# Patient Record
Sex: FEMALE | Race: OTHER | NOT HISPANIC OR LATINO | Employment: OTHER | URBAN - METROPOLITAN AREA
[De-identification: names, ages, dates, MRNs, and addresses within clinical notes are randomized per-mention and may not be internally consistent; named-entity substitution may affect disease eponyms.]

---

## 2017-01-25 ENCOUNTER — HOSPITAL ENCOUNTER (EMERGENCY)
Facility: HOSPITAL | Age: 52
Discharge: HOME | End: 2017-01-25
Admitting: EMERGENCY MEDICINE
Payer: COMMERCIAL

## 2017-01-25 VITALS
BODY MASS INDEX: 49.49 KG/M2 | SYSTOLIC BLOOD PRESSURE: 133 MMHG | HEIGHT: 62 IN | RESPIRATION RATE: 18 BRPM | WEIGHT: 268.96 LBS | DIASTOLIC BLOOD PRESSURE: 62 MMHG | OXYGEN SATURATION: 97 % | HEART RATE: 78 BPM | TEMPERATURE: 97.7 F

## 2017-01-25 DIAGNOSIS — G89.29 CHRONIC RADICULAR PAIN OF LOWER BACK: Primary | ICD-10-CM

## 2017-01-25 DIAGNOSIS — M54.16 CHRONIC RADICULAR PAIN OF LOWER BACK: Primary | ICD-10-CM

## 2017-01-25 DIAGNOSIS — IMO0001 ELEVATED BLOOD PRESSURE: ICD-10-CM

## 2017-01-25 PROCEDURE — 99283 EMERGENCY DEPT VISIT LOW MDM: CPT

## 2017-01-25 PROCEDURE — 96372 THER/PROPH/DIAG INJ SC/IM: CPT

## 2017-01-25 RX ORDER — NAPROXEN 500 MG/1
500 TABLET ORAL 2 TIMES DAILY WITH MEALS
COMMUNITY
End: 2019-11-21

## 2017-01-25 RX ORDER — TOPIRAMATE 100 MG/1
100 TABLET, FILM COATED ORAL 2 TIMES DAILY
COMMUNITY
End: 2021-08-25 | Stop reason: SINTOL

## 2017-01-25 RX ORDER — LOSARTAN POTASSIUM 50 MG/1
50 TABLET ORAL DAILY
COMMUNITY
End: 2019-11-21

## 2017-01-25 RX ORDER — GABAPENTIN 600 MG/1
600 TABLET ORAL 2 TIMES DAILY
COMMUNITY

## 2017-01-25 RX ORDER — ALBUTEROL SULFATE 2.5 MG/3ML
2.5 SOLUTION RESPIRATORY (INHALATION) 4 TIMES DAILY
COMMUNITY

## 2017-01-25 RX ORDER — ACETAMINOPHEN AND CODEINE PHOSPHATE 300; 30 MG/1; MG/1
1 TABLET ORAL EVERY 6 HOURS PRN
COMMUNITY
End: 2019-11-21

## 2017-01-25 RX ORDER — KETOROLAC TROMETHAMINE 30 MG/ML
15 INJECTION, SOLUTION INTRAMUSCULAR; INTRAVENOUS ONCE
Status: COMPLETED | OUTPATIENT
Start: 2017-01-25 | End: 2017-01-25

## 2017-01-25 RX ORDER — ERGOCALCIFEROL 1.25 MG/1
50000 CAPSULE ORAL WEEKLY
Status: ON HOLD | COMMUNITY
End: 2021-09-07 | Stop reason: ALTCHOICE

## 2017-01-25 RX ORDER — DIAZEPAM 5 MG/1
5 TABLET ORAL ONCE
Status: COMPLETED | OUTPATIENT
Start: 2017-01-25 | End: 2017-01-25

## 2017-01-25 RX ORDER — MELOXICAM 15 MG/1
15 TABLET ORAL DAILY
COMMUNITY
End: 2019-11-21

## 2017-01-25 RX ORDER — OMEPRAZOLE 40 MG/1
40 CAPSULE, DELAYED RELEASE ORAL DAILY
COMMUNITY
End: 2019-11-21

## 2017-01-25 RX ORDER — ALBUTEROL SULFATE 90 UG/1
2 AEROSOL, METERED RESPIRATORY (INHALATION) EVERY 6 HOURS PRN
COMMUNITY

## 2017-01-25 RX ADMIN — KETOROLAC TROMETHAMINE 15 MG: 30 INJECTION, SOLUTION INTRAMUSCULAR at 14:19

## 2017-01-25 RX ADMIN — DIAZEPAM 5 MG: 5 TABLET ORAL at 14:19

## 2019-07-19 ENCOUNTER — APPOINTMENT (EMERGENCY)
Dept: CT IMAGING | Facility: HOSPITAL | Age: 54
End: 2019-07-19
Payer: COMMERCIAL

## 2019-07-19 ENCOUNTER — HOSPITAL ENCOUNTER (EMERGENCY)
Facility: HOSPITAL | Age: 54
Discharge: HOME | End: 2019-07-19
Attending: EMERGENCY MEDICINE | Admitting: EMERGENCY MEDICINE
Payer: COMMERCIAL

## 2019-07-19 VITALS
SYSTOLIC BLOOD PRESSURE: 117 MMHG | DIASTOLIC BLOOD PRESSURE: 65 MMHG | HEIGHT: 62 IN | OXYGEN SATURATION: 94 % | HEART RATE: 65 BPM | BODY MASS INDEX: 29.05 KG/M2 | TEMPERATURE: 98.2 F | WEIGHT: 157.85 LBS | RESPIRATION RATE: 18 BRPM

## 2019-07-19 DIAGNOSIS — R10.13 EPIGASTRIC PAIN: ICD-10-CM

## 2019-07-19 DIAGNOSIS — K52.9 ENTERITIS: Primary | ICD-10-CM

## 2019-07-19 DIAGNOSIS — R11.2 NAUSEA AND VOMITING: ICD-10-CM

## 2019-07-19 LAB
ALBUMIN SERPL BCP-MCNC: 3.6 G/DL (ref 3.5–5)
ALP SERPL-CCNC: 205 U/L (ref 46–116)
ALT SERPL W P-5'-P-CCNC: 34 U/L (ref 12–78)
ANION GAP SERPL CALCULATED.3IONS-SCNC: 9 MMOL/L (ref 4–13)
AST SERPL W P-5'-P-CCNC: 17 U/L (ref 5–45)
B-HCG SERPL-ACNC: 2 MIU/ML
BASOPHILS # BLD AUTO: 0.06 THOUSANDS/ΜL (ref 0–0.1)
BASOPHILS NFR BLD AUTO: 1 % (ref 0–1)
BILIRUB SERPL-MCNC: 0.4 MG/DL (ref 0.2–1)
BILIRUB UR QL STRIP: NEGATIVE
BUN SERPL-MCNC: 10 MG/DL (ref 5–25)
CALCIUM SERPL-MCNC: 9.3 MG/DL (ref 8.3–10.1)
CHLORIDE SERPL-SCNC: 106 MMOL/L (ref 100–108)
CLARITY UR: CLEAR
CO2 SERPL-SCNC: 27 MMOL/L (ref 21–32)
COLOR UR: YELLOW
CREAT SERPL-MCNC: 0.61 MG/DL (ref 0.6–1.3)
EOSINOPHIL # BLD AUTO: 0.26 THOUSAND/ΜL (ref 0–0.61)
EOSINOPHIL NFR BLD AUTO: 3 % (ref 0–6)
ERYTHROCYTE [DISTWIDTH] IN BLOOD BY AUTOMATED COUNT: 13 % (ref 11.6–15.1)
GFR SERPL CREATININE-BSD FRML MDRD: 105 ML/MIN/1.73SQ M
GLUCOSE SERPL-MCNC: 84 MG/DL (ref 65–140)
GLUCOSE UR STRIP-MCNC: NEGATIVE MG/DL
HCT VFR BLD AUTO: 40.7 % (ref 34.8–46.1)
HGB BLD-MCNC: 13.6 G/DL (ref 11.5–15.4)
HGB UR QL STRIP.AUTO: NEGATIVE
HOLD SPECIMEN: NORMAL
IMM GRANULOCYTES # BLD AUTO: 0.03 THOUSAND/UL (ref 0–0.2)
IMM GRANULOCYTES NFR BLD AUTO: 0 % (ref 0–2)
KETONES UR STRIP-MCNC: NEGATIVE MG/DL
LEUKOCYTE ESTERASE UR QL STRIP: NEGATIVE
LIPASE SERPL-CCNC: 172 U/L (ref 73–393)
LYMPHOCYTES # BLD AUTO: 2.74 THOUSANDS/ΜL (ref 0.6–4.47)
LYMPHOCYTES NFR BLD AUTO: 30 % (ref 14–44)
MCH RBC QN AUTO: 29.6 PG (ref 26.8–34.3)
MCHC RBC AUTO-ENTMCNC: 33.4 G/DL (ref 31.4–37.4)
MCV RBC AUTO: 89 FL (ref 82–98)
MONOCYTES # BLD AUTO: 0.46 THOUSAND/ΜL (ref 0.17–1.22)
MONOCYTES NFR BLD AUTO: 5 % (ref 4–12)
NEUTROPHILS # BLD AUTO: 5.48 THOUSANDS/ΜL (ref 1.85–7.62)
NEUTS SEG NFR BLD AUTO: 61 % (ref 43–75)
NITRITE UR QL STRIP: NEGATIVE
NRBC BLD AUTO-RTO: 0 /100 WBCS
PH UR STRIP.AUTO: 7.5 [PH]
PLATELET # BLD AUTO: 309 THOUSANDS/UL (ref 149–390)
PMV BLD AUTO: 8.9 FL (ref 8.9–12.7)
POTASSIUM SERPL-SCNC: 3.9 MMOL/L (ref 3.5–5.3)
PROT SERPL-MCNC: 7.1 G/DL (ref 6.4–8.2)
PROT UR STRIP-MCNC: NEGATIVE MG/DL
RBC # BLD AUTO: 4.6 MILLION/UL (ref 3.81–5.12)
SODIUM SERPL-SCNC: 142 MMOL/L (ref 136–145)
SP GR UR STRIP.AUTO: 1.01 (ref 1–1.03)
TROPONIN I SERPL-MCNC: <0.02 NG/ML
UROBILINOGEN UR QL STRIP.AUTO: 2 E.U./DL
WBC # BLD AUTO: 9.03 THOUSAND/UL (ref 4.31–10.16)

## 2019-07-19 PROCEDURE — 93005 ELECTROCARDIOGRAM TRACING: CPT

## 2019-07-19 PROCEDURE — 81003 URINALYSIS AUTO W/O SCOPE: CPT | Performed by: PHYSICIAN ASSISTANT

## 2019-07-19 PROCEDURE — 96374 THER/PROPH/DIAG INJ IV PUSH: CPT

## 2019-07-19 PROCEDURE — 80053 COMPREHEN METABOLIC PANEL: CPT | Performed by: EMERGENCY MEDICINE

## 2019-07-19 PROCEDURE — 85025 COMPLETE CBC W/AUTO DIFF WBC: CPT | Performed by: EMERGENCY MEDICINE

## 2019-07-19 PROCEDURE — 36415 COLL VENOUS BLD VENIPUNCTURE: CPT | Performed by: EMERGENCY MEDICINE

## 2019-07-19 PROCEDURE — 83690 ASSAY OF LIPASE: CPT | Performed by: EMERGENCY MEDICINE

## 2019-07-19 PROCEDURE — 84484 ASSAY OF TROPONIN QUANT: CPT | Performed by: PHYSICIAN ASSISTANT

## 2019-07-19 PROCEDURE — 74177 CT ABD & PELVIS W/CONTRAST: CPT

## 2019-07-19 PROCEDURE — 99285 EMERGENCY DEPT VISIT HI MDM: CPT

## 2019-07-19 PROCEDURE — 99285 EMERGENCY DEPT VISIT HI MDM: CPT | Performed by: PHYSICIAN ASSISTANT

## 2019-07-19 PROCEDURE — 96361 HYDRATE IV INFUSION ADD-ON: CPT

## 2019-07-19 PROCEDURE — 96375 TX/PRO/DX INJ NEW DRUG ADDON: CPT

## 2019-07-19 PROCEDURE — 84702 CHORIONIC GONADOTROPIN TEST: CPT | Performed by: PHYSICIAN ASSISTANT

## 2019-07-19 RX ORDER — ONDANSETRON 4 MG/1
4 TABLET, FILM COATED ORAL EVERY 6 HOURS
Qty: 12 TABLET | Refills: 0 | Status: ON HOLD | OUTPATIENT
Start: 2019-07-19 | End: 2020-03-03 | Stop reason: ALTCHOICE

## 2019-07-19 RX ORDER — DICYCLOMINE HCL 20 MG
20 TABLET ORAL ONCE
Status: COMPLETED | OUTPATIENT
Start: 2019-07-19 | End: 2019-07-19

## 2019-07-19 RX ORDER — HYDROMORPHONE HCL/PF 1 MG/ML
1 SYRINGE (ML) INJECTION ONCE
Status: COMPLETED | OUTPATIENT
Start: 2019-07-19 | End: 2019-07-19

## 2019-07-19 RX ORDER — ACETAMINOPHEN 325 MG/1
650 TABLET ORAL ONCE
Status: COMPLETED | OUTPATIENT
Start: 2019-07-19 | End: 2019-07-19

## 2019-07-19 RX ORDER — SENNOSIDES 8.6 MG
650 CAPSULE ORAL EVERY 8 HOURS PRN
Qty: 9 TABLET | Refills: 0 | Status: SHIPPED | OUTPATIENT
Start: 2019-07-19 | End: 2019-07-22

## 2019-07-19 RX ORDER — ONDANSETRON 4 MG/1
4 TABLET, ORALLY DISINTEGRATING ORAL ONCE
Status: COMPLETED | OUTPATIENT
Start: 2019-07-19 | End: 2019-07-19

## 2019-07-19 RX ORDER — DICYCLOMINE HCL 20 MG
20 TABLET ORAL 2 TIMES DAILY
Qty: 10 TABLET | Refills: 0 | Status: ON HOLD | OUTPATIENT
Start: 2019-07-19 | End: 2020-03-03 | Stop reason: ALTCHOICE

## 2019-07-19 RX ADMIN — ACETAMINOPHEN 650 MG: 325 TABLET, FILM COATED ORAL at 23:12

## 2019-07-19 RX ADMIN — DICYCLOMINE HYDROCHLORIDE 20 MG: 20 TABLET ORAL at 20:28

## 2019-07-19 RX ADMIN — SODIUM CHLORIDE 1000 ML: 0.9 INJECTION, SOLUTION INTRAVENOUS at 20:27

## 2019-07-19 RX ADMIN — FAMOTIDINE 20 MG: 10 INJECTION, SOLUTION INTRAVENOUS at 20:28

## 2019-07-19 RX ADMIN — HYDROMORPHONE HYDROCHLORIDE 1 MG: 1 INJECTION, SOLUTION INTRAMUSCULAR; INTRAVENOUS; SUBCUTANEOUS at 20:31

## 2019-07-19 RX ADMIN — IOHEXOL 100 ML: 350 INJECTION, SOLUTION INTRAVENOUS at 21:25

## 2019-07-19 RX ADMIN — ONDANSETRON 4 MG: 4 TABLET, ORALLY DISINTEGRATING ORAL at 20:28

## 2019-07-20 LAB
ATRIAL RATE: 69 BPM
P AXIS: 78 DEGREES
PR INTERVAL: 184 MS
QRS AXIS: 79 DEGREES
QRSD INTERVAL: 88 MS
QT INTERVAL: 418 MS
QTC INTERVAL: 447 MS
T WAVE AXIS: 67 DEGREES
VENTRICULAR RATE: 69 BPM

## 2019-07-20 PROCEDURE — 93010 ELECTROCARDIOGRAM REPORT: CPT | Performed by: INTERNAL MEDICINE
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
## 2019-07-20 NOTE — DISCHARGE INSTRUCTIONS
Take Bentyl and Tylenol as indicated  Follow-up with GI as needed  Follow-up with PCP  Follow up with emergency department symptoms persist or exacerbate

## 2019-07-20 NOTE — ED NOTES
Pt refused discharge vitals at this time stating "I just want to go home"        [place]  07/19/19 3448

## 2019-11-19 RX ORDER — LISINOPRIL 10 MG/1
TABLET ORAL EVERY 24 HOURS
COMMUNITY
End: 2019-11-21

## 2019-11-19 RX ORDER — PREDNISONE 10 MG/1
TABLET ORAL
COMMUNITY
Start: 2019-11-13 | End: 2021-08-25 | Stop reason: ALTCHOICE

## 2019-11-19 RX ORDER — PANTOPRAZOLE SODIUM 40 MG/1
40 TABLET, DELAYED RELEASE ORAL
COMMUNITY
Start: 2019-07-26

## 2019-11-19 RX ORDER — LEVOFLOXACIN 500 MG/1
500 TABLET, FILM COATED ORAL DAILY
COMMUNITY
Start: 2019-11-13 | End: 2019-11-23

## 2019-11-19 RX ORDER — LIDOCAINE 50 MG/G
PATCH TOPICAL
COMMUNITY
End: 2019-11-21

## 2019-11-19 RX ORDER — DULOXETIN HYDROCHLORIDE 30 MG/1
30 CAPSULE, DELAYED RELEASE ORAL 2 TIMES DAILY
COMMUNITY
Start: 2019-11-12 | End: 2022-06-15

## 2019-11-19 RX ORDER — TRAZODONE HYDROCHLORIDE 50 MG/1
TABLET ORAL DAILY
COMMUNITY
End: 2019-11-21

## 2019-11-19 RX ORDER — CELECOXIB 200 MG/1
200 CAPSULE ORAL 2 TIMES DAILY
COMMUNITY
Start: 2019-10-15 | End: 2020-01-22 | Stop reason: SDUPTHER

## 2019-11-19 RX ORDER — SENNOSIDES 8.6 MG
1 CAPSULE ORAL EVERY 8 HOURS
COMMUNITY
Start: 2019-07-22

## 2019-11-19 RX ORDER — SUMATRIPTAN 100 MG/1
TABLET, FILM COATED ORAL DAILY
COMMUNITY
End: 2019-11-21

## 2019-11-19 RX ORDER — ONDANSETRON 4 MG/1
4 TABLET, ORALLY DISINTEGRATING ORAL AS NEEDED
COMMUNITY
Start: 2019-07-09

## 2019-11-21 ENCOUNTER — APPOINTMENT (OUTPATIENT)
Dept: RADIOLOGY | Facility: CLINIC | Age: 54
End: 2019-11-21
Payer: COMMERCIAL

## 2019-11-21 ENCOUNTER — OFFICE VISIT (OUTPATIENT)
Dept: OBGYN CLINIC | Facility: CLINIC | Age: 54
End: 2019-11-21
Payer: COMMERCIAL

## 2019-11-21 VITALS
BODY MASS INDEX: 28.87 KG/M2 | SYSTOLIC BLOOD PRESSURE: 127 MMHG | HEIGHT: 62 IN | DIASTOLIC BLOOD PRESSURE: 72 MMHG | HEART RATE: 74 BPM

## 2019-11-21 DIAGNOSIS — M25.561 PAIN IN BOTH KNEES, UNSPECIFIED CHRONICITY: Primary | ICD-10-CM

## 2019-11-21 DIAGNOSIS — M25.561 PAIN IN BOTH KNEES, UNSPECIFIED CHRONICITY: ICD-10-CM

## 2019-11-21 DIAGNOSIS — M25.562 PAIN IN BOTH KNEES, UNSPECIFIED CHRONICITY: ICD-10-CM

## 2019-11-21 DIAGNOSIS — M25.562 PAIN IN BOTH KNEES, UNSPECIFIED CHRONICITY: Primary | ICD-10-CM

## 2019-11-21 PROCEDURE — 73562 X-RAY EXAM OF KNEE 3: CPT

## 2019-11-21 PROCEDURE — 99204 OFFICE O/P NEW MOD 45 MIN: CPT | Performed by: ORTHOPAEDIC SURGERY

## 2019-11-21 PROCEDURE — 20610 DRAIN/INJ JOINT/BURSA W/O US: CPT | Performed by: ORTHOPAEDIC SURGERY

## 2019-11-21 PROCEDURE — 73560 X-RAY EXAM OF KNEE 1 OR 2: CPT

## 2019-11-21 RX ORDER — BETAMETHASONE SODIUM PHOSPHATE AND BETAMETHASONE ACETATE 3; 3 MG/ML; MG/ML
6 INJECTION, SUSPENSION INTRA-ARTICULAR; INTRALESIONAL; INTRAMUSCULAR; SOFT TISSUE
Status: COMPLETED | OUTPATIENT
Start: 2019-11-21 | End: 2019-11-21

## 2019-11-21 RX ORDER — LIDOCAINE HYDROCHLORIDE 10 MG/ML
1 INJECTION, SOLUTION INFILTRATION; PERINEURAL
Status: COMPLETED | OUTPATIENT
Start: 2019-11-21 | End: 2019-11-21

## 2019-11-21 RX ADMIN — BETAMETHASONE SODIUM PHOSPHATE AND BETAMETHASONE ACETATE 6 MG: 3; 3 INJECTION, SUSPENSION INTRA-ARTICULAR; INTRALESIONAL; INTRAMUSCULAR; SOFT TISSUE at 09:31

## 2019-11-21 RX ADMIN — LIDOCAINE HYDROCHLORIDE 1 ML: 10 INJECTION, SOLUTION INFILTRATION; PERINEURAL at 09:31

## 2019-11-21 NOTE — PROGRESS NOTES
Assessment:  1  Pain in both knees, unspecified chronicity  XR knee 3 vw right non injury    CANCELED: XR knee 3 vw left non injury     There is no problem list on file for this patient  Plan      Cortisone injections bilateral knees  Lubricant injections will be ordered   brace medial  for the right knee  We talked about physical therapy we will order physical therapy at some point to get the last couple degrees of range of motion but at this time will hold off she has a lot of things going on right now and wants to address those 1st   We talked about knee replacements and to make sure that all medical conditions are taking care of and that she quit smoking she will talk to her family physician about this  She does have multiple joint pain and body aches we are going to send her to a rheumatologist to work up the possibility of her rheumatologic like disease including and not limited 2 fibromyalgia            Subjective:     Patient ID:    Chief Complaint:Neela Harrison 48 y o  female      HPI    Patient comes in today with regards to bilateral knee pain  The patient reports that the pain is in the anterior and medially and anterior laterally and has been going on for 5-10 years  The pain is rated at3 at its best and10 at its worst   The pain is described as stabbing and aching  It is worsened with whether up and down steps, and is made better with uncertain  The patient has taken cortisone injection without relief compound cream as well for treatment  Patient used to be 310 lb had gastric bypass and lost 182 lb        The following portions of the patient's history were reviewed and updated as appropriate: allergies, current medications, past family history, past social history, past surgical history and problem list         Social History     Socioeconomic History    Marital status: /Civil Union     Spouse name: Not on file    Number of children: Not on file    Years of education: Not on file    Highest education level: Not on file   Occupational History    Not on file   Social Needs    Financial resource strain: Not on file    Food insecurity:     Worry: Not on file     Inability: Not on file    Transportation needs:     Medical: Not on file     Non-medical: Not on file   Tobacco Use    Smoking status: Current Every Day Smoker     Packs/day: 1 00     Types: Cigarettes    Smokeless tobacco: Never Used   Substance and Sexual Activity    Alcohol use: No    Drug use: No    Sexual activity: Not on file   Lifestyle    Physical activity:     Days per week: Not on file     Minutes per session: Not on file    Stress: Not on file   Relationships    Social connections:     Talks on phone: Not on file     Gets together: Not on file     Attends Alevism service: Not on file     Active member of club or organization: Not on file     Attends meetings of clubs or organizations: Not on file     Relationship status: Not on file    Intimate partner violence:     Fear of current or ex partner: Not on file     Emotionally abused: Not on file     Physically abused: Not on file     Forced sexual activity: Not on file   Other Topics Concern    Not on file   Social History Narrative    Not on file     Past Medical History:   Diagnosis Date    Arthritis     Asthma     COPD [facility]chronic obstructive pulmonary disease) [facility]     Depression     Diabetes mellitus ([facility] )     Diverticulitis     Hypertension     Migraine     Pancreatitis     Stomach problems      Past Surgical History:   Procedure Laterality Date    GALLBLADDER SURGERY      GASTRIC BYPASS      STOMACH SURGERY      tummy tuck    TUBAL LIGATION       No Known Allergies  Current Outpatient Medications on File Prior to Visit   Medication Sig Dispense Refill    acetaminophen (TYLENOL) 650 mg CR tablet Take 1 tablet by mouth every 8 (eight) hours      albuterol (2 5 mg/3 mL) 0 083 % nebulizer solution Take 2 5 mg by nebulization 4 (four) times a day      albuterol (PROVENTIL HFA,VENTOLIN HFA) 90 mcg/act inhaler Inhale 2 puffs every 6 (six) hours as needed for wheezing      celecoxib (CeleBREX) 200 mg capsule Take 200 mg by mouth 2 (two) times a day      dicyclomine (BENTYL) 20 mg tablet Take 1 tablet (20 mg total) by mouth 2 (two) times a day for 5 days 10 tablet 0    DULoxetine (CYMBALTA) 30 mg delayed release capsule Take 30 mg by mouth daily      ergocalciferol (VITAMIN D2) 50,000 units Take 50,000 Units by mouth once a week      gabapentin (NEURONTIN) 600 MG tablet Take 600 mg by mouth 3 (three) times a day      ipratropium (ATROVENT) 0 02 % nebulizer solution Inhale 0 25 mg every 6 (six) hours as needed      levofloxacin (LEVAQUIN) 500 mg tablet Take 500 mg by mouth daily      magnesium oxide (MAG-OX) 400 mg Take 400 mg by mouth 2 (two) times a day      mometasone (ASMANEX TWISTHALER) 220 MCG/INH inhaler Inhale 220 mcg daily      NON FORMULARY       ondansetron (ZOFRAN) 4 mg tablet Take 1 tablet (4 mg total) by mouth every 6 (six) hours for 3 days 12 tablet 0    ondansetron (ZOFRAN-ODT) 4 mg disintegrating tablet ondansetron 4 mg disintegrating tablet      pantoprazole (PROTONIX) 40 mg tablet 40 mg      predniSONE 10 mg tablet Take 4 tablets for 3 days then take 3 tablets for 3 days then take 2 tablets for 3 days then take 1 tablet for 3 days      topiramate (TOPAMAX) 100 mg tablet Take 100 mg by mouth 2 (two) times a day      [DISCONTINUED] fluticasone-salmeterol (ADVAIR DISKUS) 250-50 mcg/dose inhaler Inhale 1 puff 2 (two) times a day      [DISCONTINUED] rifaximin (XIFAXAN) 550 mg tablet Take 550 mg by mouth Three times a day      [DISCONTINUED] acetaminophen-codeine (TYLENOL #3) 300-30 mg per tablet Take 1 tablet by mouth every 6 (six) hours as needed for moderate pain        [DISCONTINUED] diclofenac sodium (VOLTAREN) 1 % diclofenac 1 % topical gel      [DISCONTINUED] insulin lispro (HumaLOG) 100 units/mL injection Inject under the skin 3 (three) times a day before meals      [DISCONTINUED] lidocaine (LIDODERM) 5 % lidocaine 5 % topical patch      [DISCONTINUED] lisinopril (ZESTRIL) 10 mg tablet every 24 hours      [DISCONTINUED] losartan (COZAAR) 50 mg tablet Take 50 mg by mouth daily      [DISCONTINUED] meloxicam (MOBIC) 15 mg tablet Take 15 mg by mouth daily      [DISCONTINUED] metFORMIN (GLUCOPHAGE) 1000 MG tablet Take 1,000 mg by mouth 2 (two) times a day with meals      [DISCONTINUED] Mometasone Furo-Formoterol Fum (DULERA IN) Inhale daily      [DISCONTINUED] naproxen (NAPROSYN) 500 mg tablet Take 500 mg by mouth 2 (two) times a day with meals      [DISCONTINUED] omeprazole (PriLOSEC) 40 MG capsule Take 40 mg by mouth daily      [DISCONTINUED] sitaGLIPtin (JANUVIA) 100 mg tablet Take 100 mg by mouth daily      [DISCONTINUED] SUMAtriptan (IMITREX) 100 mg tablet Daily      [DISCONTINUED] traZODone (DESYREL) 50 mg tablet Daily      [DISCONTINUED] UMECLIDINIUM-VILANTEROL IN Inhale 1 puff daily       No current facility-administered medications on file prior to visit  Objective:    Review of Systems   Constitutional: Negative  HENT: Negative  Eyes: Negative  Respiratory: Positive for cough, shortness of breath and wheezing  Cardiovascular: Negative  Gastrointestinal: Positive for abdominal pain  Endocrine: Negative  Genitourinary: Negative  Musculoskeletal:        See HPI   Skin: Negative  Allergic/Immunologic: Negative  Neurological: Positive for dizziness, numbness and headaches  Hematological: Negative  Psychiatric/Behavioral: Positive for decreased concentration  The patient is nervous/anxious  Right Knee Exam     Muscle Strength   The patient has normal right knee strength  Tenderness   The patient is experiencing tenderness in the medial joint line      Range of Motion   Extension: -5   Flexion: normal     Tests   Fazal:  Medial - negative Lateral - negative  Valgus: positive    Other   Erythema: absent  Scars: absent  Sensation: normal  Pulse: present  Swelling: none  Effusion: no effusion present    Comments:  Pinpoint tenderness bilateral knees medially      Left Knee Exam     Muscle Strength   The patient has normal left knee strength  Tenderness   The patient is experiencing tenderness in the medial joint line  Range of Motion   Extension: -5   Flexion: normal     Tests   Fazal:  Medial - negative Lateral - negative  Valgus: positive    Other   Erythema: absent  Sensation: normal  Pulse: present  Swelling: none  Effusion: no effusion present    Comments:  Pinpoint tenderness bilateral knee knees medially            Physical Exam   Constitutional: She is oriented to person, place, and time  She appears well-developed  HENT:   Head: Normocephalic  Eyes: Pupils are equal, round, and reactive to light  Neck: Normal range of motion  Cardiovascular: Normal rate  Pulmonary/Chest: Effort normal    Abdominal: She exhibits no distension  Musculoskeletal:        Right knee: She exhibits no effusion  Left knee: She exhibits no effusion  Neurological: She is alert and oriented to person, place, and time  Skin: Skin is warm  Psychiatric: She has a normal mood and affect  Nursing note and vitals reviewed        Large joint arthrocentesis: R knee  Date/Time: 11/21/2019 9:31 AM  Consent given by: patient  Supporting Documentation  Indications: pain   Procedure Details  Location: knee - R knee  Needle size: 22 G  Ultrasound guidance: no  Approach: anterolateral  Medications administered: 1 mL lidocaine 1 %; 6 mg betamethasone acetate-betamethasone sodium phosphate 6 (3-3) mg/mL      Large joint arthrocentesis: L knee  Date/Time: 11/21/2019 9:31 AM  Consent given by: patient  Timeout: Immediately prior to procedure a time out was called to verify the correct patient, procedure, equipment, support staff and site/side marked as required   Supporting Documentation  Indications: pain   Procedure Details  Location: knee - L knee  Needle size: 22 G  Ultrasound guidance: no  Approach: anterolateral  Medications administered: 1 mL lidocaine 1 %; 6 mg betamethasone acetate-betamethasone sodium phosphate 6 (3-3) mg/mL               I have personally reviewed pertinent films in PACS and my interpretation is X-ray show moderate to severe arthritis on the medial joint space both knees right being worse than left and also patellofemoral joint  There is joint space narrowing osteophyte formation subchondral sclerosis as well as subchondral cyst       Portions of the record may have been created with voice recognition software   Occasional wrong word or "sound a like" substitutions may have occurred due to the inherent limitations of voice recognition software   Read the chart carefully and recognize, using context, where substitutions have occurred

## 2019-12-04 ENCOUNTER — TELEPHONE (OUTPATIENT)
Dept: OBGYN CLINIC | Facility: HOSPITAL | Age: 54
End: 2019-12-04

## 2019-12-04 NOTE — TELEPHONE ENCOUNTER
TO BE COMPLETED BY CENTRAL AUTH TEAM:     Physician: DR Levin Precise    Medication: Oralee Ivanoff    Number of Injections in Series (Appointments scheduled 1 week apart from one another): 3    Schedule after this date: UPON AVAILABILITY    Billing Info: Buy and Bill/Specialty Pharmacy-Patient Supply>>SPECIALTY PHARMACY    Appointment Message Line:  (please copy and paste appointment message line when scheduling appointment) BILATERAL EUFLEXXA #1,#2,#3/[facility]-SPECIALTY PHARMACY    Additional Comments: INJECTIONS SENT TO [facility]

## 2019-12-09 NOTE — TELEPHONE ENCOUNTER
You can schedule her on the 12th, the 19th and skip a week and schedule the third on on 1/2  Its okay to skip a week in between the injections

## 2019-12-09 NOTE — TELEPHONE ENCOUNTER
Dr Sharyle Netters is not in this office 3 weeks in a row until February  Can someone else give one of the injections so she can have them sooner?  Please advise

## 2019-12-10 RX ORDER — BUDESONIDE AND FORMOTEROL FUMARATE DIHYDRATE 160; 4.5 UG/1; UG/1
2 AEROSOL RESPIRATORY (INHALATION) 2 TIMES DAILY
COMMUNITY
Start: 2019-12-10

## 2019-12-10 RX ORDER — METHYLPREDNISOLONE SODIUM SUCCINATE 125 MG/2ML
125 INJECTION, POWDER, LYOPHILIZED, FOR SOLUTION INTRAMUSCULAR; INTRAVENOUS
COMMUNITY
Start: 2019-12-10 | End: 2019-12-10

## 2019-12-10 RX ORDER — LEVOFLOXACIN 500 MG/1
500 TABLET, FILM COATED ORAL DAILY
COMMUNITY
Start: 2019-12-10 | End: 2019-12-20

## 2019-12-12 ENCOUNTER — OFFICE VISIT (OUTPATIENT)
Dept: OBGYN CLINIC | Facility: CLINIC | Age: 54
End: 2019-12-12
Payer: COMMERCIAL

## 2019-12-12 VITALS — DIASTOLIC BLOOD PRESSURE: 75 MMHG | HEART RATE: 77 BPM | SYSTOLIC BLOOD PRESSURE: 113 MMHG

## 2019-12-12 DIAGNOSIS — M17.0 PRIMARY OSTEOARTHRITIS OF BOTH KNEES: Primary | ICD-10-CM

## 2019-12-12 PROCEDURE — 20610 DRAIN/INJ JOINT/BURSA W/O US: CPT | Performed by: ORTHOPAEDIC SURGERY

## 2019-12-12 RX ORDER — HYALURONATE SODIUM 10 MG/ML
20 SYRINGE (ML) INTRAARTICULAR
Status: COMPLETED | OUTPATIENT
Start: 2019-12-12 | End: 2019-12-12

## 2019-12-12 RX ADMIN — Medication 20 MG: at 14:20

## 2019-12-12 NOTE — PROGRESS NOTES
1  Primary osteoarthritis of both knees  Large joint arthrocentesis    Large joint arthrocentesis     Patient is here for her 1st injection of euflexxa into the bilateral knee  Patient reports knee pain right worse than left worse with activities weight bearing  Physical exam of the knee shows no effusion no ecchymosis    All other organ systems normal    Large joint arthrocentesis: L knee  Date/Time: 12/12/2019 2:20 PM  Consent given by: patient  Site marked: site marked  Timeout: Immediately prior to procedure a time out was called to verify the correct patient, procedure, equipment, support staff and site/side marked as required   Supporting Documentation  Indications: pain   Procedure Details  Location: knee - L knee  Preparation: Patient was prepped and draped in the usual sterile fashion  Needle size: 22 G  Ultrasound guidance: no  Approach: anterolateral  Medications administered: 20 mg Sodium Hyaluronate 20 MG/2ML  Specialty Pharmacy Supplied: received medications from pharmacy  Patient tolerance: patient tolerated the procedure well with no immediate complications  Dressing:  Sterile dressing applied    Large joint arthrocentesis: R knee  Date/Time: 12/12/2019 2:20 PM  Consent given by: patient  Site marked: site marked  Timeout: Immediately prior to procedure a time out was called to verify the correct patient, procedure, equipment, support staff and site/side marked as required   Supporting Documentation  Indications: pain   Procedure Details  Location: knee - R knee  Preparation: Patient was prepped and draped in the usual sterile fashion  Needle size: 22 G  Ultrasound guidance: no  Approach: anterolateral  Medications administered: 20 mg Sodium Hyaluronate 20 MG/2ML  Specialty Pharmacy Supplied: received medications from pharmacy  Patient tolerance: patient tolerated the procedure well with no immediate complications  Dressing:  Sterile dressing applied          Patient tolerated procedure follow up 1 week

## 2019-12-19 ENCOUNTER — OFFICE VISIT (OUTPATIENT)
Dept: OBGYN CLINIC | Facility: CLINIC | Age: 54
End: 2019-12-19
Payer: COMMERCIAL

## 2019-12-19 VITALS — DIASTOLIC BLOOD PRESSURE: 86 MMHG | SYSTOLIC BLOOD PRESSURE: 127 MMHG | HEART RATE: 75 BPM

## 2019-12-19 DIAGNOSIS — M17.0 PRIMARY OSTEOARTHRITIS OF BOTH KNEES: Primary | ICD-10-CM

## 2019-12-19 PROCEDURE — 20610 DRAIN/INJ JOINT/BURSA W/O US: CPT | Performed by: ORTHOPAEDIC SURGERY

## 2019-12-19 RX ORDER — HYALURONATE SODIUM 10 MG/ML
20 SYRINGE (ML) INTRAARTICULAR
Status: COMPLETED | OUTPATIENT
Start: 2019-12-19 | End: 2019-12-19

## 2019-12-19 RX ADMIN — Medication 20 MG: at 08:31

## 2019-12-19 NOTE — PROGRESS NOTES
1  Primary osteoarthritis of both knees  Large joint arthrocentesis    Large joint arthrocentesis     Patient is here for her  2nd injection of  Euflexxa into the bilateral knee  Patient reports knee pain left worse than right  No significant improvements since the 1st injection  Physical exam of the knee shows no effusion no ecchymosis        Large joint arthrocentesis: L knee  Date/Time: 12/19/2019 8:31 AM  Consent given by: patient  Site marked: site marked  Timeout: Immediately prior to procedure a time out was called to verify the correct patient, procedure, equipment, support staff and site/side marked as required   Supporting Documentation  Indications: pain   Procedure Details  Location: knee - L knee  Preparation: Patient was prepped and draped in the usual sterile fashion  Needle size: 22 G  Ultrasound guidance: no  Approach: anterolateral  Medications administered: 20 mg Sodium Hyaluronate 20 MG/2ML  Specialty Pharmacy Supplied: received medications from pharmacy  Patient tolerance: patient tolerated the procedure well with no immediate complications  Dressing:  Sterile dressing applied    Large joint arthrocentesis: R knee  Date/Time: 12/19/2019 8:31 AM  Consent given by: patient  Site marked: site marked  Timeout: Immediately prior to procedure a time out was called to verify the correct patient, procedure, equipment, support staff and site/side marked as required   Supporting Documentation  Indications: pain   Procedure Details  Location: knee - R knee  Preparation: Patient was prepped and draped in the usual sterile fashion  Needle size: 22 G  Ultrasound guidance: no  Approach: anterolateral  Medications administered: 20 mg Sodium Hyaluronate 20 MG/2ML  Specialty Pharmacy Supplied: received medications from pharmacy  Patient tolerance: patient tolerated the procedure well with no immediate complications  Dressing:  Sterile dressing applied          Patient tolerated procedure follow up  1 week

## 2020-01-02 ENCOUNTER — OFFICE VISIT (OUTPATIENT)
Dept: OBGYN CLINIC | Facility: CLINIC | Age: 55
End: 2020-01-02
Payer: COMMERCIAL

## 2020-01-02 DIAGNOSIS — M17.0 PRIMARY OSTEOARTHRITIS OF BOTH KNEES: Primary | ICD-10-CM

## 2020-01-02 PROCEDURE — 20610 DRAIN/INJ JOINT/BURSA W/O US: CPT | Performed by: ORTHOPAEDIC SURGERY

## 2020-01-02 RX ORDER — HYALURONATE SODIUM 10 MG/ML
20 SYRINGE (ML) INTRAARTICULAR
Status: COMPLETED | OUTPATIENT
Start: 2020-01-02 | End: 2020-01-02

## 2020-01-02 RX ADMIN — Medication 20 MG: at 09:54

## 2020-01-02 NOTE — PROGRESS NOTES
1  Primary osteoarthritis of both knees  Large joint arthrocentesis    Large joint arthrocentesis     Patient is here for her  3rd injection of  Euflexxa into the bilateral knee  Patient reports improvements in pain in the right knee but  Not on the left  Physical exam of the knee shows no effusion no ecchymosis  Large joint arthrocentesis: L knee  Date/Time: 1/2/2020 9:54 AM  Consent given by: patient  Site marked: site marked  Timeout: Immediately prior to procedure a time out was called to verify the correct patient, procedure, equipment, support staff and site/side marked as required   Supporting Documentation  Indications: pain   Procedure Details  Location: knee - L knee  Preparation: Patient was prepped and draped in the usual sterile fashion  Needle size: 22 G  Ultrasound guidance: no  Approach: anterolateral  Medications administered: 20 mg Sodium Hyaluronate 20 MG/2ML    Patient tolerance: patient tolerated the procedure well with no immediate complications  Dressing:  Sterile dressing applied    Large joint arthrocentesis: R knee  Date/Time: 1/2/2020 9:54 AM  Consent given by: patient  Site marked: site marked  Timeout: Immediately prior to procedure a time out was called to verify the correct patient, procedure, equipment, support staff and site/side marked as required   Supporting Documentation  Indications: pain   Procedure Details  Location: knee - R knee  Preparation: Patient was prepped and draped in the usual sterile fashion  Needle size: 22 G  Ultrasound guidance: no  Approach: anterolateral  Medications administered: 20 mg Sodium Hyaluronate 20 MG/2ML    Patient tolerance: patient tolerated the procedure well with no immediate complications  Dressing:  Sterile dressing applied          Patient tolerated procedure follow up  P r n

## 2020-01-22 ENCOUNTER — OFFICE VISIT (OUTPATIENT)
Dept: RHEUMATOLOGY | Facility: CLINIC | Age: 55
End: 2020-01-22
Payer: COMMERCIAL

## 2020-01-22 VITALS — HEIGHT: 62 IN | BODY MASS INDEX: 29.44 KG/M2 | RESPIRATION RATE: 18 BRPM | WEIGHT: 160 LBS

## 2020-01-22 DIAGNOSIS — M19.90 OSTEOARTHRITIS, UNSPECIFIED OSTEOARTHRITIS TYPE, UNSPECIFIED SITE: Primary | ICD-10-CM

## 2020-01-22 DIAGNOSIS — G56.03 BILATERAL CARPAL TUNNEL SYNDROME: ICD-10-CM

## 2020-01-22 DIAGNOSIS — M79.7 FIBROMYALGIA: ICD-10-CM

## 2020-01-22 DIAGNOSIS — M25.562 PAIN IN BOTH KNEES, UNSPECIFIED CHRONICITY: ICD-10-CM

## 2020-01-22 DIAGNOSIS — M25.561 PAIN IN BOTH KNEES, UNSPECIFIED CHRONICITY: ICD-10-CM

## 2020-01-22 PROCEDURE — 96372 THER/PROPH/DIAG INJ SC/IM: CPT | Performed by: INTERNAL MEDICINE

## 2020-01-22 PROCEDURE — 99244 OFF/OP CNSLTJ NEW/EST MOD 40: CPT | Performed by: INTERNAL MEDICINE

## 2020-01-22 RX ORDER — KETOROLAC TROMETHAMINE 30 MG/ML
30 INJECTION, SOLUTION INTRAMUSCULAR; INTRAVENOUS ONCE
Status: COMPLETED | OUTPATIENT
Start: 2020-01-22 | End: 2020-01-22

## 2020-01-22 RX ORDER — CELECOXIB 100 MG/1
100 CAPSULE ORAL 2 TIMES DAILY
Qty: 60 CAPSULE | Refills: 3 | Status: SHIPPED | OUTPATIENT
Start: 2020-01-22 | End: 2020-05-08

## 2020-01-22 RX ORDER — CYCLOBENZAPRINE HCL 10 MG
10 TABLET ORAL
Qty: 30 TABLET | Refills: 3 | Status: ON HOLD | OUTPATIENT
Start: 2020-01-22 | End: 2021-09-07 | Stop reason: ALTCHOICE

## 2020-01-22 RX ADMIN — KETOROLAC TROMETHAMINE 30 MG: 30 INJECTION, SOLUTION INTRAMUSCULAR; INTRAVENOUS at 13:42

## 2020-01-22 NOTE — PROGRESS NOTES
Assessment and Plan: Poncho Bhatia is a 48 y o   female who presents as a Rheumatology consult referred by Vida Neri DO for evaluation of possible inflammatory arthritis  She complains of significant pain in her shoulders, arms, legs, and hips  Since patient's bilateral knee x-rays from 11/2019 reveal osteoarthritis changes and inflammatory markers in the past were normal, it is likely that patient is dealing with osteoarthritis of multiple joints plus concurrent firbromyalgia symptoms based on tender points on physical exam  Also, her NIURKA and RF were negative in the past, making an autoimmune disease less likely to be the cause of her pain  Administered 30mg IM ketorolac injection in clinic today to help with her current arthritis symptoms, and also prescribed celecoxib 100mg po bid, which is less likely to cause GI side effects in this patient who has history of gastric by pass  Her carpal tunnel syndrome have also been more bothersome lately, so made Hand Surgery referral; had an EMG in the past that suggested carpal tunnel syndrome  For her fibromyalgia and muscle spasm symptoms, have prescribed cyclobenzaprine 10mg po qhs  Will re-evaluate how patient is doing in three months  Plan:  Diagnoses and all orders for this visit:    Osteoarthritis, unspecified osteoarthritis type, unspecified site  -     ketorolac (TORADOL) injection 30 mg  -     celecoxib (CeleBREX) 100 mg capsule; Take 1 capsule (100 mg total) by mouth 2 (two) times a day    Pain in both knees, unspecified chronicity  -     Ambulatory referral to Rheumatology  -     Sedimentation rate, automated  -     C-reactive protein  -     Cyclic citrul peptide antibody, IgG    Bilateral carpal tunnel syndrome  -     Ambulatory referral to Hand Surgery; Future    Fibromyalgia  -     cyclobenzaprine (FLEXERIL) 10 mg tablet;  Take 1 tablet (10 mg total) by mouth daily at bedtime    Follow-up plan: Return to clinic in 3 months      HPI  Poncho Bhatia is a 48 y o   female who presents as a Rheumatology consult referred by Hina Awan DO for evaluation of possible inflammatory arthritis  Patient admits to pain in her shoulders, arms, legs, and hips  She admits to her elbows becoming hot, and her knees getting swollen  She also gets headaches anf floaters  She had an EMG that should carpal tunnel syndrome several years ago  Of note, patient smokes less than 1 PPD of cigarettes; used to smoke > 2 PPD  Patient has been unable to take oral NSAIDs since she had gastric bypass surgery in 3/2017  She was prescribe duloxetine for her pain, but it is not helping  Review of Systems  Review of Systems   Constitutional: Positive for fatigue  Negative for chills, fever and unexpected weight change  HENT: Negative for mouth sores and trouble swallowing  Ear infection, dry mouth   Eyes: Positive for visual disturbance  Negative for pain  Respiratory: Positive for shortness of breath and wheezing  Negative for cough  Cardiovascular: Negative for chest pain and leg swelling  Gastrointestinal: Positive for abdominal pain, constipation and nausea  Negative for blood in stool and diarrhea  Indigestion/heartburn   Endocrine: Positive for cold intolerance  Musculoskeletal: Positive for arthralgias, back pain, myalgias and neck pain  Negative for joint swelling  Skin: Positive for rash  Negative for color change  Neurological: Positive for weakness, numbness and headaches  Hematological: Negative for adenopathy  Bruises/bleeds easily  Psychiatric/Behavioral: Positive for dysphoric mood  Negative for sleep disturbance         Allergies  No Known Allergies    Home Medications    Current Outpatient Medications:     acetaminophen (TYLENOL) 650 mg CR tablet, Take 1 tablet by mouth every 8 (eight) hours, Disp: , Rfl:     albuterol (2 5 mg/3 mL) 0 083 % nebulizer solution, Take 2 5 mg by nebulization 4 (four) times a day, Disp: , Rfl:     albuterol (PROVENTIL HFA,VENTOLIN HFA) 90 mcg/act inhaler, Inhale 2 puffs every 6 (six) hours as needed for wheezing, Disp: , Rfl:     budesonide-formoterol (SYMBICORT) 160-4 5 mcg/act inhaler, Inhale 2 puffs 2 (two) times a day, Disp: , Rfl:     celecoxib (CeleBREX) 100 mg capsule, Take 1 capsule (100 mg total) by mouth 2 (two) times a day, Disp: 60 capsule, Rfl: 3    DULoxetine (CYMBALTA) 30 mg delayed release capsule, Take 30 mg by mouth daily, Disp: , Rfl:     ergocalciferol (VITAMIN D2) 50,000 units, Take 50,000 Units by mouth once a week, Disp: , Rfl:     gabapentin (NEURONTIN) 600 MG tablet, Take 600 mg by mouth 3 (three) times a day, Disp: , Rfl:     ipratropium (ATROVENT) 0 02 % nebulizer solution, Inhale 0 25 mg every 6 (six) hours as needed, Disp: , Rfl:     magnesium oxide (MAG-OX) 400 mg, Take 400 mg by mouth 2 (two) times a day, Disp: , Rfl:     mometasone (ASMANEX TWISTHALER) 220 MCG/INH inhaler, Inhale 220 mcg daily, Disp: , Rfl:     NON FORMULARY, , Disp: , Rfl:     ondansetron (ZOFRAN-ODT) 4 mg disintegrating tablet, ondansetron 4 mg disintegrating tablet, Disp: , Rfl:     pantoprazole (PROTONIX) 40 mg tablet, 40 mg, Disp: , Rfl:     predniSONE 10 mg tablet, Take 4 tablets for 3 days then take 3 tablets for 3 days then take 2 tablets for 3 days then take 1 tablet for 3 days, Disp: , Rfl:     tiotropium-olodaterol (STIOLTO RESPIMAT) 2 5-2 5 MCG/ACT inhaler, Inhale 2 puffs daily, Disp: , Rfl:     topiramate (TOPAMAX) 100 mg tablet, Take 100 mg by mouth 2 (two) times a day, Disp: , Rfl:     ascorbic acid (VITAMIN C) 500 mg tablet, Take 1 tablet (500 mg total) by mouth daily, Disp: 30 tablet, Rfl: 0    cyclobenzaprine (FLEXERIL) 10 mg tablet, Take 1 tablet (10 mg total) by mouth daily at bedtime, Disp: 30 tablet, Rfl: 3    enoxaparin (LOVENOX) 40 mg/0 4 mL, Inject 0 4 mL (40 mg total) under the skin daily for 28 days Starting after surgery, Disp: 28 Syringe, Rfl: 0    ferrous sulfate 324 (65 Fe) mg, Take 1 tablet (324 mg total) by mouth 2 (two) times a day before meals, Disp: 60 tablet, Rfl: 0    folic acid (FOLVITE) 399 mcg tablet, Take 1 tablet (400 mcg total) by mouth daily, Disp: 30 tablet, Rfl: 0    HYDROcodone-acetaminophen (NORCO) 5-325 mg per tablet, Take 1 tablet by mouth every 6 (six) hours as needed for painMax Daily Amount: 4 tablets, Disp: 20 tablet, Rfl: 0    Multiple Vitamin (MULTIVITAMIN) tablet, Take 1 tablet by mouth daily, Disp: 30 tablet, Rfl: 1    tiotropium (SPIRIVA RESPIMAT) 2 5 MCG/ACT AERS inhaler, Inhale daily, Disp: , Rfl:     Past Medical History  Past Medical History:   Diagnosis Date    Arthritis     Asthma     [facility]ronic obstructive pulmonary disease) ([facility] [address] )     Coronary artery disease     Depression     [facility]     with weight loss no longer has high BS    Diverticulitis     Fibromyalgia     Fibromyalgia, primary     GERD (gastroesophageal reflux disease)     Hypertension     lost weight and no longer has    Migraine     Pancreatitis     Stomach problems        Past Surgical History   Past Surgical History:   Procedure Laterality Date    ABDOMINAL SURGERY      CHOLECYSTECTOMY      GALLBLADDER SURGERY      GASTRIC BYPASS      PANNICULECTOMY  2017    AR WRIST Alaina Frazier LIG Right 3/3/2020    Procedure: RELEASE CARPAL TUNNEL ENDOSCOPIC Right;  Surgeon: Yomi De La Torre MD;  Location: [facility];  Service: Orthopedics    STOMACH SURGERY      tummy tuck    TUBAL LIGATION         Family History    Family History   Problem Relation Age of Onset    Rheum arthritis Mother     Hypertension Mother     COPD Mother     Diabetes Father        Social History  Social History     Substance and Sexual Activity   Alcohol Use No     Social History     Substance and Sexual Activity   Drug Use No     Social History     Tobacco Use   Smoking Status Current Every Day Smoker    Packs/day: 0 50    Types: Cigarettes   Smokeless Tobacco Never Used       Objective:  Vitals:    01/22/20 1249   Resp: 18   Weight: 72 6 kg (160 lb)   Height: 5' 2" (1 575 m)       Physical Exam   Constitutional: She appears well-developed and well-nourished  She is cooperative  No distress  HENT:   Head: Normocephalic and atraumatic  Mouth/Throat: Oropharynx is clear and moist and mucous membranes are normal    Eyes: Conjunctivae, EOM and lids are normal  No scleral icterus  Neck: Neck supple  No spinous process tenderness and no muscular tenderness present  No thyromegaly present  Cardiovascular: Normal rate, regular rhythm, S1 normal and S2 normal  Exam reveals no friction rub  No murmur heard  Pulmonary/Chest: Effort normal and breath sounds normal  No tachypnea  No respiratory distress  She has no wheezes  She has no rhonchi  She has no rales  She exhibits no tenderness  Musculoskeletal: She exhibits tenderness  R knee tenderness, L trochanteric bursa tenderness   Lymphadenopathy:        Head (right side): No submental and no submandibular adenopathy present  Head (left side): No submental and no submandibular adenopathy present  She has no cervical adenopathy  Neurological: She is alert  She has normal strength  No sensory deficit  Skin: Skin is warm and dry  No rash noted  Nails show no clubbing  Psychiatric: She has a normal mood and affect  Her behavior is normal        Reviewed labs and imaging      Imaging:   Bilateral Knee x-rays 11/21/19  Moderate to severe medial compartment arthritis bilaterally  Patellofemoral arthritis bilaterally    Labs:   Office Visit on 01/22/2020   Component Date Value Ref Range Status    Sed Rate 02/04/2020 14  0 - 20 mm/hour Final    CRP 02/04/2020 <3 0  <3 0 mg/L Final    Cyclic Citrullin Peptide Ab 02/04/2020 8  0 - 19 units Final                              Negative               <20                            Weak positive      20 - 39                            Moderate positive  40 - 59 Strong positive        >59   Admission on 07/19/2019, Discharged on 07/19/2019   Component Date Value Ref Range Status    WBC 07/19/2019 9 03  4 31 - 10 16 Thousand/uL Final    RBC 07/19/2019 4 60  3 81 - 5 12 Million/uL Final    Hemoglobin 07/19/2019 13 6  11 5 - 15 4 g/dL Final    Hematocrit 07/19/2019 40 7  34 8 - 46 1 % Final    MCV 07/19/2019 89  82 - 98 fL Final    MCH 07/19/2019 29 6  26 8 - 34 3 pg Final    MCHC 07/19/2019 33 4  31 4 - 37 4 g/dL Final    RDW 07/19/2019 13 0  11 6 - 15 1 % Final    MPV 07/19/2019 8 9  8 9 - 12 7 fL Final    Platelets 87/67/9529 309  149 - 390 Thousands/uL Final    nRBC 07/19/2019 0  /100 WBCs Final    Neutrophils Relative 07/19/2019 61  43 - 75 % Final    Immat GRANS % 07/19/2019 0  0 - 2 % Final    Lymphocytes Relative 07/19/2019 30  14 - 44 % Final    Monocytes Relative 07/19/2019 5  4 - 12 % Final    Eosinophils Relative 07/19/2019 3  0 - 6 % Final    Basophils Relative 07/19/2019 1  0 - 1 % Final    Neutrophils Absolute 07/19/2019 5 48  1 85 - 7 62 Thousands/µL Final    Immature Grans Absolute 07/19/2019 0 03  0 00 - 0 20 Thousand/uL Final    Lymphocytes Absolute 07/19/2019 2 74  0 60 - 4 47 Thousands/µL Final    Monocytes Absolute 07/19/2019 0 46  0 17 - 1 22 Thousand/µL Final    Eosinophils Absolute 07/19/2019 0 26  0 00 - 0 61 Thousand/µL Final    Basophils Absolute 07/19/2019 0 06  0 00 - 0 10 Thousands/µL Final    Sodium 07/19/2019 142  136 - 145 mmol/L Final    Potassium 07/19/2019 3 9  3 5 - 5 3 mmol/L Final    Chloride 07/19/2019 106  100 - 108 mmol/L Final    CO2 07/19/2019 27  21 - 32 mmol/L Final    ANION GAP 07/19/2019 9  4 - 13 mmol/L Final    BUN 07/19/2019 10  5 - 25 mg/dL Final    Creatinine 07/19/2019 0 61  0 60 - 1 30 mg/dL Final    Standardized to IDMS reference method    Glucose 07/19/2019 84  65 - 140 mg/dL Final      If the patient is fasting, the ADA then defines impaired fasting glucose as > 100 mg/dL and diabetes as > or equal to 123 mg/dL  Specimen collection should occur prior to Sulfasalazine administration due to the potential for falsely depressed results  Specimen collection should occur prior to Sulfapyridine administration due to the potential for falsely elevated results   Calcium 07/19/2019 9 3  8 3 - 10 1 mg/dL Final    AST 07/19/2019 17  5 - 45 U/L Final      Specimen collection should occur prior to Sulfasalazine administration due to the potential for falsely depressed results   ALT 07/19/2019 34  12 - 78 U/L Final      Specimen collection should occur prior to Sulfasalazine administration due to the potential for falsely depressed results   Alkaline Phosphatase 07/19/2019 205* 46 - 116 U/L Final    Total Protein 07/19/2019 7 1  6 4 - 8 2 g/dL Final    Albumin 07/19/2019 3 6  3 5 - 5 0 g/dL Final    Total Bilirubin 07/19/2019 0 40  0 20 - 1 00 mg/dL Final    eGFR 07/19/2019 105  ml/min/1 73sq m Final    Lipase 07/19/2019 172  73 - 393 u/L Final    Extra Tube 07/19/2019 Hold for add-ons  Final    Auto resulted      Color, UA 07/19/2019 Yellow   Final    Clarity, UA 07/19/2019 Clear   Final    Specific Gravity, UA 07/19/2019 1 010  1 003 - 1 030 Final    pH, UA 07/19/2019 7 5  4 5, 5 0, 5 5, 6 0, 6 5, 7 0, 7 5, 8 0 Final    Leukocytes, UA 07/19/2019 Negative  Negative Final    Nitrite, UA 07/19/2019 Negative  Negative Final    Protein, UA 07/19/2019 Negative  Negative mg/dl Final    Glucose, UA 07/19/2019 Negative  Negative mg/dl Final    Ketones, UA 07/19/2019 Negative  Negative mg/dl Final    Urobilinogen, UA 07/19/2019 2 0* 0 2, 1 0 E U /dl E U /dl Final    Bilirubin, UA 07/19/2019 Negative  Negative Final    Blood, UA 07/19/2019 Negative  Negative Final    HCG, Quant 07/19/2019 2  <=6 mIU/mL Final    Troponin I 07/19/2019 <0 02  <=0 04 ng/mL Final    3Autovalidation override  [employer] Chemistry analyzer 99% cutoff is > 0 04 ng/mL in network labs     o cTnI 99% cutoff is useful only when applied to patients in the clinical setting of myocardial ischemia   o cTnI 99% cutoff should be interpreted in the context of clinical history, ECG findings and possibly cardiac imaging to establish correct diagnosis  o cTnI 99% cutoff may be suggestive but clearly not indicative of a coronary event without the clinical setting of myocardial ischemia        Ventricular Rate 07/19/2019 69  BPM Final    Atrial Rate 07/19/2019 69  BPM Final    W[place] Interval 07/19/2019 184  ms Final    QRSD Interval 07/19/2019 88  ms Final    QT Interval 07/19/2019 418  ms Final    QTC Interval 07/19/2019 447  ms Final    P Axis 07/19/2019 78  degrees Final    QRS Axis 07/19/2019 79  degrees Final    T Wave Axis 07/19/2019 67  degrees Final

## 2020-01-22 NOTE — PATIENT INSTRUCTIONS
Do bloodwork when you get the chance  Hand Surgery referral made  Take celecoxib 100mg twice a day  Take cyclobenzaprine at bedtime  Toradol injection given in clinic today    Return to clinic in 3 months     Fibromyalgia   WHAT Dante:   What is fibromyalgia? Fibromyalgia is a long-term condition that causes pain and tender points throughout your body  Fibromyalgia can start at any age and is more common in women than in men  What causes fibromyalgia? Healthcare providers do not know exactly what causes fibromyalgia  Problems with chemicals that send pain messages to and from the brain are thought to cause fibromyalgia  It may also be caused or triggered by any of the following:  · Hormone changes    · Physical injury    · Intense emotional trauma from sexual, physical, or emotional abuse  What are the signs and symptoms of fibromyalgia? The most common symptom of fibromyalgia is widespread pain for at least 3 months  You may also have tender spots  Tender spots are specific areas or points on both sides of your body that are painful when pressed  You may have tender spots in your neck, upper chest, shoulders, or shoulder blades  Other common areas are the elbows, lower back, sides of the thighs, and knees  You may also have any of the following:  · Fatigue and difficulty sleeping    · Diarrhea, constipation, pain, or bloating    · Headaches, memory problems, difficulty concentrating, or anxiety    · Numbness, muscle stiffness, or swelling of the hands and feet    · Pounding, racing heartbeats or chest pain  How is fibromyalgia diagnosed? Your healthcare provider will examine you and ask about your symptoms and other health conditions  He will do a manual tender point exam and press on specific sites or points in your body  Increased pain in most of these spots means a positive tender point exam  There are no specific lab tests to diagnose fibromyalgia   Blood and urine tests, a spinal tap, or sleep studies may be done to rule out other causes of pain  How is fibromyalgia treated? Fibromyalgia can be treated but not cured  The following can help you manage your pain and other symptoms:  · Acetaminophen and ibuprofen: These medicines decrease pain  They are available without a doctor's order  Ask your healthcare provider which medicine is right for you  Ask how much to take and how often to take it  Follow directions  These medicines can cause stomach bleeding if not taken correctly  Ibuprofen can cause kidney damage  Acetaminophen can cause liver damage  · Pain medicine: You may be given a prescription medicine to decrease pain  Do not wait until the pain is severe before you take this medicine  · Muscle relaxers  help decrease pain and muscle spasms  · Antidepressants: These help decrease depression, pain, and fatigue  · Antiseizure medicine: This is used to reduce fibromyalgia pain  What are the risks of fibromyalgia? If untreated, your symptoms may get worse  Pain may make it difficult to do daily activities  Your risk for fatigue, headaches, and depression may increase  How can I manage my symptoms? · Keep a pain diary:  Record your symptoms and what activity caused them  This may also help you track pain cycles and show a pattern to your symptoms  · Exercise:  Ask your healthcare provider about the best exercise plan for you  Exercise and other strength-training activities may decrease pain and sleep problems  · Set good sleep habits:  Do not nap during the day  Go to bed at the same time each night  Make sure your bedroom is dark, quiet, and comfortable  Do not stay in bed if you cannot sleep  Get up and do something relaxing until you are sleepy  Do not drink caffeine or alcohol right before you go to bed  These can make it difficult for you to sleep  Limit other liquids to help decrease your need to urinate in the night  Where can I find support and more information? · National Chronic Fatigue Syndrome and Fibromyalgia Association  [address] [facility] , [address] [place] [address]  Phone: 2- 488 - [phone]  Web Address: [employer] com ee  org  When should I contact my healthcare provider? · You pain increases, even after you take your pain medicine  · You have difficulty sleeping  · You have questions or concerns about your condition or care  When should I seek immediate care or call 911? · You are depressed and feel you cannot cope with your condition  CARE AGREEMENT:   You have the right to help plan your care  Learn about your health condition and how it may be treated  Discuss treatment options with your caregivers to decide what care you want to receive  You always have the right to refuse treatment  The above information is an  only  It is not intended as medical advice for individual conditions or treatments  Talk to your doctor, nurse or pharmacist before following any medical regimen to see if it is safe and effective for you  © 2017 [employer] Information is for End User's use only and may not be sold, redistributed or otherwise used for commercial purposes  All illustrations and images included in CareNotes® are the copyrighted property of A [employer] A M [employer] Inc  or [employer]

## 2020-01-29 ENCOUNTER — OFFICE VISIT (OUTPATIENT)
Dept: OBGYN CLINIC | Facility: CLINIC | Age: 55
End: 2020-01-29
Payer: COMMERCIAL

## 2020-01-29 VITALS
HEIGHT: 62 IN | SYSTOLIC BLOOD PRESSURE: 125 MMHG | BODY MASS INDEX: 29.19 KG/M2 | DIASTOLIC BLOOD PRESSURE: 79 MMHG | HEART RATE: 72 BPM | WEIGHT: 158.6 LBS

## 2020-01-29 DIAGNOSIS — G56.03 BILATERAL CARPAL TUNNEL SYNDROME: ICD-10-CM

## 2020-01-29 PROCEDURE — 99213 OFFICE O/P EST LOW 20 MIN: CPT | Performed by: ORTHOPAEDIC SURGERY

## 2020-01-29 RX ORDER — TRAZODONE HYDROCHLORIDE 150 MG/1
150 TABLET ORAL
COMMUNITY
Start: 2020-01-27 | End: 2020-02-27

## 2020-01-29 NOTE — PROGRESS NOTES
CHIEF COMPLAINT:  Chief Complaint   Patient presents with    Right Wrist - Pain    Left Wrist - Pain       SUBJECTIVE:  Michelle Giles is a 48y o  year old  female who presents to the office with chief complaint of bilateral wrist pain, with numbness and tingling that affects both hands and radiates up to her elbows  Pt states that the left is more bothersome than the right  Pt presented to the office in bilateral wrist splints  Pt states that the numbness wakes her from sleep at night  Pt ambulates with a cane  Pt has a hx of fibromyalgia and polyneuropathy due to DM        PAST MEDICAL HISTORY:  Past Medical History:   Diagnosis Date    Arthritis     Asthma     COPD (chronic obstructive pulmonary disease) ([facility] )     Depression     Diabetes mellitus ([facility] )     Diverticulitis     Hypertension     Migraine     Pancreatitis     Stomach problems        PAST SURGICAL HISTORY:  Past Surgical History:   Procedure Laterality Date    GALLBLADDER SURGERY      GASTRIC BYPASS      STOMACH SURGERY      tummy tuck    TUBAL LIGATION         FAMILY HISTORY:  Family History   Problem Relation Age of Onset    Rheum arthritis Mother     Hypertension Mother     COPD Mother     Diabetes Father        SOCIAL HISTORY:  Social History     Tobacco Use    Smoking status: Current Every Day Smoker     Packs/day: 1 00     Types: Cigarettes    Smokeless tobacco: Never Used   Substance Use Topics    Alcohol use: No    Drug use: No       MEDICATIONS:    Current Outpatient Medications:     acetaminophen (TYLENOL) 650 mg CR tablet, Take 1 tablet by mouth every 8 (eight) hours, Disp: , Rfl:     albuterol (2 5 mg/3 mL) 0 083 % nebulizer solution, Take 2 5 mg by nebulization 4 (four) times a day, Disp: , Rfl:     albuterol (PROVENTIL HFA,VENTOLIN HFA) 90 mcg/act inhaler, Inhale 2 puffs every 6 (six) hours as needed for wheezing, Disp: , Rfl:     budesonide-formoterol (SYMBICORT) 160-4 5 mcg/act inhaler, Inhale 2 puffs 2 (two) times a day, Disp: , Rfl:     celecoxib (CeleBREX) 100 mg capsule, Take 1 capsule (100 mg total) by mouth 2 (two) times a day, Disp: 60 capsule, Rfl: 3    cyclobenzaprine (FLEXERIL) 10 mg tablet, Take 1 tablet (10 mg total) by mouth daily at bedtime, Disp: 30 tablet, Rfl: 3    DULoxetine (CYMBALTA) 30 mg delayed release capsule, Take 30 mg by mouth daily, Disp: , Rfl:     ergocalciferol (VITAMIN D2) 50,000 units, Take 50,000 Units by mouth once a week, Disp: , Rfl:     gabapentin (NEURONTIN) 600 MG tablet, Take 600 mg by mouth 3 (three) times a day, Disp: , Rfl:     ipratropium (ATROVENT) 0 02 % nebulizer solution, Inhale 0 25 mg every 6 (six) hours as needed, Disp: , Rfl:     magnesium oxide (MAG-OX) 400 mg, Take 400 mg by mouth 2 (two) times a day, Disp: , Rfl:     mometasone (ASMANEX TWISTHALER) 220 MCG/INH inhaler, Inhale 220 mcg daily, Disp: , Rfl:     NON FORMULARY, , Disp: , Rfl:     ondansetron (ZOFRAN-ODT) 4 mg disintegrating tablet, ondansetron 4 mg disintegrating tablet, Disp: , Rfl:     pantoprazole (PROTONIX) 40 mg tablet, 40 mg, Disp: , Rfl:     predniSONE 10 mg tablet, Take 4 tablets for 3 days then take 3 tablets for 3 days then take 2 tablets for 3 days then take 1 tablet for 3 days, Disp: , Rfl:     tiotropium (SPIRIVA RESPIMAT) 2 5 MCG/ACT AERS inhaler, Inhale daily, Disp: , Rfl:     tiotropium-olodaterol (STIOLTO RESPIMAT) 2 5-2 5 MCG/ACT inhaler, Inhale 2 puffs daily, Disp: , Rfl:     topiramate (TOPAMAX) 100 mg tablet, Take 100 mg by mouth 2 (two) times a day, Disp: , Rfl:     traZODone (DESYREL) 150 mg tablet, Take 150 mg by mouth, Disp: , Rfl:     dicyclomine (BENTYL) 20 mg tablet, Take 1 tablet (20 mg total) by mouth 2 (two) times a day for 5 days, Disp: 10 tablet, Rfl: 0    ondansetron (ZOFRAN) 4 mg tablet, Take 1 tablet (4 mg total) by mouth every 6 (six) hours for 3 days, Disp: 12 tablet, Rfl: 0    ALLERGIES:  No Known Allergies    REVIEW OF SYSTEMS:  Review of Systems   Constitutional: Negative for chills, fever and unexpected weight change  HENT: Negative for hearing loss, nosebleeds and sore throat  Eyes: Negative for pain, redness and visual disturbance  Respiratory: Negative for cough, shortness of breath and wheezing  Cardiovascular: Negative for chest pain, palpitations and leg swelling  Gastrointestinal: Negative for abdominal pain, nausea and vomiting  Endocrine: Negative for polydipsia and polyuria  Genitourinary: Negative for dysuria and hematuria  Skin: Negative for rash and wound  Neurological: Negative for dizziness and headaches  Psychiatric/Behavioral: Negative for decreased concentration, dysphoric mood and suicidal ideas  The patient is not nervous/anxious  VITALS:  Vitals:    01/29/20 0817   BP: 125/79   Pulse: 72       LABS:  HgA1c: No results found for: HGBA1C  BMP:   Lab Results   Component Value Date    CALCIUM 9 3 07/19/2019    K 3 9 07/19/2019    CO2 27 07/19/2019     07/19/2019    BUN 10 07/19/2019    CREATININE 0 61 07/19/2019       _____________________________________________________  PHYSICAL EXAMINATION:  General: well developed and well nourished, alert, oriented times 3 and appears comfortable  Psychiatric: Normal  HEENT: Trachea Midline, No torticollis  Pulmonary: No audible wheezing or strider  Cardiovascular: No discernable arrhythmia   Skin: No masses, erythema, lacerations, fluctation, ulcerations  Neurovascular: Sensation Intact to the Median, Ulnar, Radial Nerve, Motor Intact to the Median, Ulnar, Radial Nerve and Pulses Intact    MUSCULOSKELETAL EXAMINATION:  Bilateral Carpal Tunnel Exam:    Negative thenar atrophy  Positive phalen's test  Positive carpal tunnel compression  Negative tinels over median nerve at the wrist   Opposition strength 5/5    Abduction strength 5/5       2 point discrimination is 4 mm throughout left hand with second guessing on the thumb and index finger, right hand not tested         ___________________________________________________  STUDIES REVIEWED:  No studies reviewed  PROCEDURES PERFORMED:  Procedures  No Procedures performed today    _____________________________________________________  ASSESSMENT/PLAN:  L>R hand numbness and tingling with bilateral wrist pain   BLUE EDx was ordered   *Pt was advised to only wear wrists splints at night and Dc them during the day   *Pt will follow up in the office after EDx        Follow Up:  Return for EDx review          To Do Next Visit:  Re-evaluation of current issue      Scribe Attestation    I,:   Andrea Miles am acting as a scribe while in the presence of the attending physician :        I,:   Yomi De La Torre MD personally performed the services described in this documentation    as scribed in my presence :

## 2020-02-04 ENCOUNTER — APPOINTMENT (OUTPATIENT)
Dept: LAB | Facility: CLINIC | Age: 55
End: 2020-02-04
Payer: COMMERCIAL

## 2020-02-04 LAB
CRP SERPL QL: <3 MG/L
ERYTHROCYTE [SEDIMENTATION RATE] IN BLOOD: 14 MM/HOUR (ref 0–20)

## 2020-02-04 PROCEDURE — 86200 CCP ANTIBODY: CPT | Performed by: INTERNAL MEDICINE

## 2020-02-04 PROCEDURE — 85652 RBC SED RATE AUTOMATED: CPT | Performed by: INTERNAL MEDICINE

## 2020-02-04 PROCEDURE — 36415 COLL VENOUS BLD VENIPUNCTURE: CPT | Performed by: INTERNAL MEDICINE

## 2020-02-04 PROCEDURE — 86140 C-REACTIVE PROTEIN: CPT | Performed by: INTERNAL MEDICINE

## 2020-02-06 LAB — CCP IGA+IGG SERPL IA-ACNC: 8 UNITS (ref 0–19)

## 2020-02-24 ENCOUNTER — TELEPHONE (OUTPATIENT)
Dept: NEUROLOGY | Facility: CLINIC | Age: 55
End: 2020-02-24

## 2020-02-24 NOTE — TELEPHONE ENCOUNTER
Patient calling in asking if we received this form  Made her aware that I do not see it in the chart anywhere or in the faxes  Gave her back fax number  She is going to call PCP to re-fax to back fax  Team - when scanning into chart, please route to Luis M! TY!

## 2020-02-24 NOTE — TELEPHONE ENCOUNTER
Pt called in to check into this came, made her aware that we do not yet have this  Pt will call back again to see if we receive this before her appt tomorrow

## 2020-02-24 NOTE — TELEPHONE ENCOUNTER
2/24 SPOKE W/ PATIENT & INFORMED HER THAT HER INSURANCE REQUIRES PHYSICIAN ORDERS THAT CAN ONLY BE ISSUED BY THE PCP IN ORDER FOR THE INSURANCE TO ISSUE PAYMENT    ADVISED PT TO CALL PCP AND HAVE PHYS, ORDER FAXED ASAP

## 2020-02-25 ENCOUNTER — PROCEDURE VISIT (OUTPATIENT)
Dept: NEUROLOGY | Facility: CLINIC | Age: 55
End: 2020-02-25
Payer: COMMERCIAL

## 2020-02-25 DIAGNOSIS — G56.03 BILATERAL CARPAL TUNNEL SYNDROME: ICD-10-CM

## 2020-02-25 PROCEDURE — 95886 MUSC TEST DONE W/N TEST COMP: CPT | Performed by: PHYSICAL MEDICINE & REHABILITATION

## 2020-02-25 PROCEDURE — 95911 NRV CNDJ TEST 9-10 STUDIES: CPT | Performed by: PHYSICAL MEDICINE & REHABILITATION

## 2020-02-25 NOTE — PROGRESS NOTES
EMG 2 Limb Upper Extremity     Date/Time 2/25/2020 2:02 PM     Performed by  Louann Simmons MD     Authorized by Maricarmen Lim MD      Universal Protocol Consent: Verbal consent obtained  Risks and benefits: risks, benefits and alternatives were discussed  Consent given by: patient  Patient understanding: patient states understanding of the procedure being performed  Patient consent: the patient's understanding of the procedure matches consent given  Patient identity confirmed: verbally with patient               EMG REPORT  Patient is a 53-year-old female presents with complaints of bilateral wrist pain associated with numbness and tingling that affects both hands and radiates up to her elbows  She states the left is more bothersome than the right  She denies neck pain  She has a history of fibromyalgia  Patient is being evaluated for focal neuropathy  On physical examination, motor strength is 5/5 throughout  Sensations are diminished to light touch and pinprick in the bilateral median and left ulnar nerve distribution  The left median motor terminal latency was prolonged at 4 3 milliseconds with a normal compound motor action potential amplitude and normal conduction velocity across the wrist   The  right median motor terminal latency was prolonged at 5 0 milliseconds with normal compound motor action potential amplitude and a slow conduction velocity of 45 m/sec across the wrist   The right ulnar compound motor action potential was normal   The left  ulnar motor terminal latency was normal with normal compound motor action potential amplitude and a slow conduction velocity of 48 m/sec across the wrist and 43 m/sec across the elbow  The left median sensory peak latency was prolonged at 4 4 milliseconds with normal sensory action potential amplitude  The  right median sensory peak latency was prolonged at 5 1 milliseconds with normal sensory action potential amplitude    The right ulnar sensory action potential was normal   The right radial sensory action potential was normal   The left radial sensory action potential was normal   The left ulnar sensory peak latency was normal with a low sensory action potential amplitude of 3 microvolts  The left and right median and ulnar F wave latencies were within normal limits  Concentric needle EMG was performed on various proximal and distal muscles of the bilateral upper extremities including  biceps, triceps, pronator teres, apb, FCU, FDI and low cervical paraspinals  There was no evidence of active denervation any other muscles tested  Moderate decreased recruitment of giant motor units was noted in the bilateral abductor pollicis brevis, left FDI and FCU  Early recruited motor units appear normal with recruitment patterns being full or full for effort in the remaining muscles tested  INTERPRETATION: There is electrophysiologic evidence of a:    1  Bilateral moderate median nerve compression neuropathy at the wrist with demyelinative changes, right worse than the left, consistent with a diagnosis of carpal tunnel syndrome  2  Moderate ulnar neuropathy at the elbow on the left with demyelinative changes as evidenced by the slowing of conduction velocity across the elbow  3  There is no evidence of a cervical radiculopathy bilaterally  Clinical correlation is recommended       JOSEF Paul

## 2020-02-26 ENCOUNTER — TRANSCRIBE ORDERS (OUTPATIENT)
Dept: NEUROLOGY | Facility: CLINIC | Age: 55
End: 2020-02-26

## 2020-02-26 DIAGNOSIS — G56.03 CARPAL TUNNEL SYNDROME, BILATERAL UPPER LIMBS: Primary | ICD-10-CM

## 2020-02-27 ENCOUNTER — OFFICE VISIT (OUTPATIENT)
Dept: OBGYN CLINIC | Facility: CLINIC | Age: 55
End: 2020-02-27
Payer: COMMERCIAL

## 2020-02-27 VITALS
WEIGHT: 158.6 LBS | HEART RATE: 71 BPM | DIASTOLIC BLOOD PRESSURE: 84 MMHG | SYSTOLIC BLOOD PRESSURE: 129 MMHG | BODY MASS INDEX: 29.19 KG/M2 | HEIGHT: 62 IN

## 2020-02-27 DIAGNOSIS — G56.03 BILATERAL CARPAL TUNNEL SYNDROME: Primary | ICD-10-CM

## 2020-02-27 PROCEDURE — 99213 OFFICE O/P EST LOW 20 MIN: CPT | Performed by: ORTHOPAEDIC SURGERY

## 2020-02-27 RX ORDER — HYDROCODONE BITARTRATE AND ACETAMINOPHEN 5; 325 MG/1; MG/1
1 TABLET ORAL EVERY 6 HOURS PRN
Qty: 20 TABLET | Refills: 0 | Status: SHIPPED | OUTPATIENT
Start: 2020-02-27 | End: 2021-09-07 | Stop reason: HOSPADM

## 2020-02-27 NOTE — H&P
CHIEF COMPLAINT:  Chief Complaint   Patient presents with    Right Wrist - Follow-up    Left Wrist - Follow-up       SUBJECTIVE:  Brayan Guzman is a 48y o  year old female who presents for follow-up regarding bilateral hand numbness and tingling  Patient had an EMG and is here for follow-up  She states that the left hand is more bothersome than the right  She states the numbness wakes her from sleep at night  The patient denies any cardiac  She does have a history of COPD  Denies diabetes  Denies any history of MI, gastric ulcers, kidney or liver issues  Denies blood thinners  She had gastric bypass surgery as well as has fibromyalgia      PAST MEDICAL HISTORY:  Past Medical History:   Diagnosis Date    Arthritis     Asthma     [facility]     Depression     Diabetes mellitus ([facility] )     Diverticulitis     Hypertension     Migraine     Pancreatitis     Stomach problems        PAST SURGICAL HISTORY:  Past Surgical History:   Procedure Laterality Date    GALLBLADDER SURGERY      GASTRIC BYPASS      STOMACH SURGERY      tummy tuck    TUBAL LIGATION         FAMILY HISTORY:  Family History   Problem Relation Age of Onset    Rheum arthritis Mother     Hypertension Mother     COPD Mother     Diabetes Father        SOCIAL HISTORY:  Social History     Tobacco Use    Smoking status: Current Every Day Smoker     Packs/day: 1 00     Types: Cigarettes    Smokeless tobacco: Never Used   Substance Use Topics    Alcohol use: No    Drug use: No       MEDICATIONS:    Current Outpatient Medications:     acetaminophen (TYLENOL) 650 mg CR tablet, Take 1 tablet by mouth every 8 (eight) hours, Disp: , Rfl:     albuterol (2 5 mg/3 mL) 0 083 % nebulizer solution, Take 2 5 mg by nebulization 4 (four) times a day, Disp: , Rfl:     albuterol (PROVENTIL HFA,VENTOLIN HFA) 90 mcg/act inhaler, Inhale 2 puffs every 6 (six) hours as needed for wheezing, Disp: , Rfl:    budesonide-formoterol (SYMBICORT) 160-4 5 mcg/act inhaler, Inhale 2 puffs 2 (two) times a day, Disp: , Rfl:     celecoxib (CeleBREX) 100 mg capsule, Take 1 capsule (100 mg total) by mouth 2 (two) times a day, Disp: 60 capsule, Rfl: 3    cyclobenzaprine (FLEXERIL) 10 mg tablet, Take 1 tablet (10 mg total) by mouth daily at bedtime, Disp: 30 tablet, Rfl: 3    DULoxetine (CYMBALTA) 30 mg delayed release capsule, Take 30 mg by mouth daily, Disp: , Rfl:     ergocalciferol (VITAMIN D2) 50,000 units, Take 50,000 Units by mouth once a week, Disp: , Rfl:     gabapentin (NEURONTIN) 600 MG tablet, Take 600 mg by mouth 3 (three) times a day, Disp: , Rfl:     ipratropium (ATROVENT) 0 02 % nebulizer solution, Inhale 0 25 mg every 6 (six) hours as needed, Disp: , Rfl:     magnesium oxide (MAG-OX) 400 mg, Take 400 mg by mouth 2 (two) times a day, Disp: , Rfl:     mometasone (ASMANEX TWISTHALER) 220 MCG/INH inhaler, Inhale 220 mcg daily, Disp: , Rfl:     NON FORMULARY, , Disp: , Rfl:     ondansetron (ZOFRAN-ODT) 4 mg disintegrating tablet, ondansetron 4 mg disintegrating tablet, Disp: , Rfl:     pantoprazole (PROTONIX) 40 mg tablet, 40 mg, Disp: , Rfl:     predniSONE 10 mg tablet, Take 4 tablets for 3 days then take 3 tablets for 3 days then take 2 tablets for 3 days then take 1 tablet for 3 days, Disp: , Rfl:     tiotropium (SPIRIVA RESPIMAT) 2 5 MCG/ACT AERS inhaler, Inhale daily, Disp: , Rfl:     tiotropium-olodaterol (STIOLTO RESPIMAT) 2 5-2 5 MCG/ACT inhaler, Inhale 2 puffs daily, Disp: , Rfl:     topiramate (TOPAMAX) 100 mg tablet, Take 100 mg by mouth 2 (two) times a day, Disp: , Rfl:     dicyclomine (BENTYL) 20 mg tablet, Take 1 tablet (20 mg total) by mouth 2 (two) times a day for 5 days, Disp: 10 tablet, Rfl: 0    HYDROcodone-acetaminophen (NORCO) 5-325 mg per tablet, Take 1 tablet by mouth every 6 (six) hours as needed for painMax Daily Amount: 4 tablets, Disp: 20 tablet, Rfl: 0    ondansetron (ZOFRAN) 4 mg tablet, Take 1 tablet (4 mg total) by mouth every 6 (six) hours for 3 days, Disp: 12 tablet, Rfl: 0    ALLERGIES:  No Known Allergies    REVIEW OF SYSTEMS:  Review of Systems  ROS:   General: no fever, no chills  HEENT:  No loss of hearing or eyesight problems  Eyes:  No red eyes  Respiratory:  No coughing, shortness of breath or wheezing  Cardiovascular:  No chest pain, no palpitations  GI:  Abdomen soft nontender, denies nausea  Endocrine:  No muscle weakness, no frequent urination, no excessive thirst  Urinary:  No dysuria, no incontinence  Musculoskeletal: see HPI and PE  SKIN:  No skin rash, no dry skin  Neurological:  No headaches, no confusion  Psychiatric:  No suicide thoughts, no anxiety, no depression  Review of all other systems is negative  VITALS:  Vitals:    02/27/20 0752   BP: 129/84   Pulse: 71       LABS:  HgA1c: No results found for: HGBA1C  BMP:   Lab Results   Component Value Date    CALCIUM 9 3 07/19/2019    K 3 9 07/19/2019    CO2 27 07/19/2019     07/19/2019    BUN 10 07/19/2019    CREATININE 0 61 07/19/2019       _____________________________________________________  PHYSICAL EXAMINATION:  General: well developed and well nourished, alert, oriented times 3 and appears comfortable  Psychiatric: Normal  HEENT: Trachea Midline, No torticollis  Heart:  Regular rate and rhythm  Lungs:  Clear to auscultation  Pulmonary: No audible wheezing or respiratory distress   Skin: No masses, erythema, lacerations, fluctation, ulcerations  Neurovascular: Sensation Intact to the Median, Ulnar, Radial Nerve, Motor Intact to the Median, Ulnar, Radial Nerve and Pulses Intact    MUSCULOSKELETAL EXAMINATION:  Bilateral carpal tunnel exam:    Negative thenar atrophy  Positive Phalen's test   Positive carpal tunnel compression  Negative Tinel's over the median nerve at the wrist   Opposition strength 5/5  Abduction strength 5/5      Two point discrimination is 4 mm throughout on the left hand was 2nd guessing on the thumb, index finger  Right hand was not tested for 2 point discrimination    ___________________________________________________  STUDIES REVIEWED:  EMG bilateral upper extremity demonstrated moderate median nerve compression at the wrist with demyelinating changes right worse than left; moderate ulnar neuropathy at the elbow on the left  PROCEDURES PERFORMED:  Procedures  No Procedures performed today    _____________________________________________________  ASSESSMENT/PLAN:    Bilateral carpal tunnel syndrome, left> right  - it was discussed with the patient that we will proceed with right endoscopic carpal tunnel release as she is left-handed  We will then proceed with left endoscopic carpal tunnel release about 2 weeks after surgery  Detail consent was obtained for both sides   Surgery: right endoscopic carpal tunnel release   Anesthesia:  IV sedation   Antibiotics:  None   Medical clearance: not needed   Hand therapy: ordered   Consent: obtained   Post op pain medication sent to pharmacy today    Surgery medication instructions: You will stop eating and drinking at midnight the night before your surgery, but you may continue to take your normal medications with a small sip of water  In the morning on the day of your surgery, we would like you to take the following medication:   Tylenol 500mg one tablet by mouth    After surgery, we would like you to take Tylenol 500 mg one tablet by mouth every 6 hours  (at breakfast, lunch and dinner) for 5-7 days after your surgery  Please take this medication EVERYDAY after surgery for 5-7 days, and not just as needed  Taking this medications after surgery will limit your need for prescription pain medication  We will also prescribe a narcotic pain medication for a limited time after surgery that you can take as needed for moderate or severe pain  The narcotic pain medication may also have Tylenol in it    Please limit Tylenol usage to under 3,000mg a day  Diagnoses and all orders for this visit:    Bilateral carpal tunnel syndrome  -     Ambulatory referral to PT/OT hand therapy; Future  -     Case request operating room: RELEASE CARPAL TUNNEL ENDOSCOPIC Right; Standing  -     Case request operating room: RELEASE CARPAL TUNNEL ENDOSCOPIC Right  -     HYDROcodone-acetaminophen (NORCO) 5-325 mg per tablet; Take 1 tablet by mouth every 6 (six) hours as needed for painMax Daily Amount: 4 tablets    Other orders  -     Diet NPO; Sips with meds; Standing  -     Height and weight upon arrival; Standing  -     Void on call to OR; Standing  -     Insert peripheral IV; Standing          Follow Up:  Return for post op   Work/school status:  No restrictions    To Do Next Visit:  Re-evaluation of current issue    Operative Discussions:  Endoscopic Carpal Tunnel Release: The anatomy and physiology of carpal tunnel syndrome was discussed with the patient today  Increase pressure localized under the transverse carpal ligament can cause pain, numbness, tingling, or dysesthesias within the median nerve distribution as well as feelings of fatigue, clumsiness, or awkwardness  These symptoms typically occur at night and worse in the morning upon waking  Eventually, untreated carpal tunnel syndrome can result in weakness and permanent loss of muscle within the thenar compartment of the hand  Treatment options were discussed with the patient  Conservative treatment includes nocturnal resting splints to keep the nerve in a neutral position, ergonomic changes within the work or home environment, activity modification, and tendon gliding exercises  Vitamin B6 one tablet daily over the counter may helpful to reduce symptoms  Steroid injections within the carpal canal can help a majority of patients, however this is often self-limited in a majority of patients    Surgical intervention to divide the transverse carpal ligament typically results in a long-lasting relief of the patient's complaints, with the recurrence rate of less than 1%  The patient has elected to undergo an endoscopic carpal tunnel release  The single incision technique was discussed with the patient, which results in approximately a two-week recovery time less wound complications  In the postoperative period, light activities are allowed immediately, driving is allowed when narcotic medication has stopped, and the bandages may be removed and incision may get wet after 2 days  Heavy activities (lifting more than approximately 10 pounds) will be allowed after follow up appointment in 1-2 weeks  While night symptoms (waking from sleep, pain, and discomfort in the hands) generally improves rapidly, the numbness and tingling as well as the strength will slowly improve over weeks to months depending on the chronicity and severity of the carpal tunnel syndrome  Pillar pain and scar discomfort were discussed with the patient which are self-limiting conditions  The risks of bleeding and infection from the surgery are less than 1%  Risk of recurrence is approximately 0 5%  The risks of nerve injury or nerve damage or damage to the blood vessels is approximately 1 in 1200  The patient has an understanding of the above mentioned discussion  The risks and benefits of the procedure were explained to the patient, which include, but are not limited to: Bleeding, infection, recurrence, pain, scar, damage to tendons, damage to nerves, and damage to blood vessels, failure to give desired results and complications related to anesthesia  These risks, along with alternative conservative treatment options, and postoperative protocols were voiced back and understood by the patient  All questions were answered to the patient's satisfaction  The patient agrees to comply with a standard postoperative protocol, and is willing to proceed  Education was provided via written and auditory forms    There were no barriers to learning  Written handouts regarding wound care, incision and scar care, and general preoperative information was provided to the patient  Prior to surgery, the patient may be requested to stop all anti-inflammatory medications  Prophylactic aspirin, Plavix, and Coumadin may be allowed to be continued  Medications including vitamin E , ginkgo, &fish oil are requested to be stopped about one week      Scribe Attestation    I,:   Mohini Ward PA-C am acting as a scribe while in the presence of the attending physician :        I,:   Nathaly Monroe MD personally performed the services described in this documentation    as scribed in my presence :

## 2020-02-27 NOTE — PRE-PROCEDURE INSTRUCTIONS
Pre-Surgery Instructions:   Medication Instructions    acetaminophen (TYLENOL) 650 mg CR tablet Instructed patient per Anesthesia Guidelines   albuterol (2 5 mg/3 mL) 0 083 % nebulizer solution Instructed patient per Anesthesia Guidelines   albuterol (PROVENTIL HFA,VENTOLIN HFA) 90 mcg/act inhaler Instructed patient per Anesthesia Guidelines   budesonide-formoterol (SYMBICORT) 160-4 5 mcg/act inhaler Instructed patient per Anesthesia Guidelines   celecoxib (CeleBREX) 100 mg capsule Instructed patient per Anesthesia Guidelines   cyclobenzaprine (FLEXERIL) 10 mg tablet Instructed patient per Anesthesia Guidelines   dicyclomine (BENTYL) 20 mg tablet Instructed patient per Anesthesia Guidelines   DULoxetine (CYMBALTA) 30 mg delayed release capsule Instructed patient per Anesthesia Guidelines   gabapentin (NEURONTIN) 600 MG tablet Instructed patient per Anesthesia Guidelines   HYDROcodone-acetaminophen (NORCO) 5-325 mg per tablet Instructed patient per Anesthesia Guidelines   ipratropium (ATROVENT) 0 02 % nebulizer solution Instructed patient per Anesthesia Guidelines   magnesium oxide (MAG-OX) 400 mg Instructed patient per Anesthesia Guidelines   mometasone (ASMANEX TWISTHALER) 220 MCG/INH inhaler Instructed patient per Anesthesia Guidelines   NON FORMULARY Instructed patient per Anesthesia Guidelines   ondansetron (ZOFRAN-ODT) 4 mg disintegrating tablet Instructed patient per Anesthesia Guidelines   pantoprazole (PROTONIX) 40 mg tablet Instructed patient per Anesthesia Guidelines   tiotropium (SPIRIVA RESPIMAT) 2 5 MCG/ACT AERS inhaler Instructed patient per Anesthesia Guidelines   tiotropium-olodaterol (STIOLTO RESPIMAT) 2 5-2 5 MCG/ACT inhaler Instructed patient per Anesthesia Guidelines   topiramate (TOPAMAX) 100 mg tablet Instructed patient per Anesthesia Guidelines   traZODone (DESYREL) 150 mg tablet Instructed patient per Anesthesia Guidelines

## 2020-02-27 NOTE — PROGRESS NOTES
CHIEF COMPLAINT:  Chief Complaint   Patient presents with    Right Wrist - Follow-up    Left Wrist - Follow-up       SUBJECTIVE:  Mayito Marcus is a 48y o  year old female who presents for follow-up regarding bilateral hand numbness and tingling  Patient had an EMG and is here for follow-up  She states that the left hand is more bothersome than the right  She states the numbness wakes her from sleep at night  The patient denies any cardiac  She does have a history of COPD  Denies diabetes  Denies any history of MI, gastric ulcers, kidney or liver issues  Denies blood thinners  She had gastric bypass surgery as well as has fibromyalgia      PAST MEDICAL HISTORY:  Past Medical History:   Diagnosis Date    Arthritis     Asthma     [facility] [address] [facility]     Depression     Diabetes mellitus ([facility] )     Diverticulitis     Hypertension     Migraine     Pancreatitis     Stomach problems        PAST SURGICAL HISTORY:  Past Surgical History:   Procedure Laterality Date    GALLBLADDER SURGERY      GASTRIC BYPASS      STOMACH SURGERY      tummy tuck    TUBAL LIGATION         FAMILY HISTORY:  Family History   Problem Relation Age of Onset    Rheum arthritis Mother     Hypertension Mother     COPD Mother     Diabetes Father        SOCIAL HISTORY:  Social History     Tobacco Use    Smoking status: Current Every Day Smoker     Packs/day: 1 00     Types: Cigarettes    Smokeless tobacco: Never Used   Substance Use Topics    Alcohol use: No    Drug use: No       MEDICATIONS:    Current Outpatient Medications:     acetaminophen (TYLENOL) 650 mg CR tablet, Take 1 tablet by mouth every 8 (eight) hours, Disp: , Rfl:     albuterol (2 5 mg/3 mL) 0 083 % nebulizer solution, Take 2 5 mg by nebulization 4 (four) times a day, Disp: , Rfl:     albuterol (PROVENTIL HFA,VENTOLIN HFA) 90 mcg/act inhaler, Inhale 2 puffs every 6 (six) hours as needed for wheezing, Disp: , Rfl:    budesonide-formoterol (SYMBICORT) 160-4 5 mcg/act inhaler, Inhale 2 puffs 2 (two) times a day, Disp: , Rfl:     celecoxib (CeleBREX) 100 mg capsule, Take 1 capsule (100 mg total) by mouth 2 (two) times a day, Disp: 60 capsule, Rfl: 3    cyclobenzaprine (FLEXERIL) 10 mg tablet, Take 1 tablet (10 mg total) by mouth daily at bedtime, Disp: 30 tablet, Rfl: 3    DULoxetine (CYMBALTA) 30 mg delayed release capsule, Take 30 mg by mouth daily, Disp: , Rfl:     ergocalciferol (VITAMIN D2) 50,000 units, Take 50,000 Units by mouth once a week, Disp: , Rfl:     gabapentin (NEURONTIN) 600 MG tablet, Take 600 mg by mouth 3 (three) times a day, Disp: , Rfl:     ipratropium (ATROVENT) 0 02 % nebulizer solution, Inhale 0 25 mg every 6 (six) hours as needed, Disp: , Rfl:     magnesium oxide (MAG-OX) 400 mg, Take 400 mg by mouth 2 (two) times a day, Disp: , Rfl:     mometasone (ASMANEX TWISTHALER) 220 MCG/INH inhaler, Inhale 220 mcg daily, Disp: , Rfl:     NON FORMULARY, , Disp: , Rfl:     ondansetron (ZOFRAN-ODT) 4 mg disintegrating tablet, ondansetron 4 mg disintegrating tablet, Disp: , Rfl:     pantoprazole (PROTONIX) 40 mg tablet, 40 mg, Disp: , Rfl:     predniSONE 10 mg tablet, Take 4 tablets for 3 days then take 3 tablets for 3 days then take 2 tablets for 3 days then take 1 tablet for 3 days, Disp: , Rfl:     tiotropium (SPIRIVA RESPIMAT) 2 5 MCG/ACT AERS inhaler, Inhale daily, Disp: , Rfl:     tiotropium-olodaterol (STIOLTO RESPIMAT) 2 5-2 5 MCG/ACT inhaler, Inhale 2 puffs daily, Disp: , Rfl:     topiramate (TOPAMAX) 100 mg tablet, Take 100 mg by mouth 2 (two) times a day, Disp: , Rfl:     dicyclomine (BENTYL) 20 mg tablet, Take 1 tablet (20 mg total) by mouth 2 (two) times a day for 5 days, Disp: 10 tablet, Rfl: 0    HYDROcodone-acetaminophen (NORCO) 5-325 mg per tablet, Take 1 tablet by mouth every 6 (six) hours as needed for painMax Daily Amount: 4 tablets, Disp: 20 tablet, Rfl: 0    ondansetron (ZOFRAN) 4 mg tablet, Take 1 tablet (4 mg total) by mouth every 6 (six) hours for 3 days, Disp: 12 tablet, Rfl: 0    ALLERGIES:  No Known Allergies    REVIEW OF SYSTEMS:  Review of Systems  ROS:   General: no fever, no chills  HEENT:  No loss of hearing or eyesight problems  Eyes:  No red eyes  Respiratory:  No coughing, shortness of breath or wheezing  Cardiovascular:  No chest pain, no palpitations  GI:  Abdomen soft nontender, denies nausea  Endocrine:  No muscle weakness, no frequent urination, no excessive thirst  Urinary:  No dysuria, no incontinence  Musculoskeletal: see HPI and PE  SKIN:  No skin rash, no dry skin  Neurological:  No headaches, no confusion  Psychiatric:  No suicide thoughts, no anxiety, no depression  Review of all other systems is negative  VITALS:  Vitals:    02/27/20 0752   BP: 129/84   Pulse: 71       LABS:  HgA1c: No results found for: HGBA1C  BMP:   Lab Results   Component Value Date    CALCIUM 9 3 07/19/2019    K 3 9 07/19/2019    CO2 27 07/19/2019     07/19/2019    BUN 10 07/19/2019    CREATININE 0 61 07/19/2019       _____________________________________________________  PHYSICAL EXAMINATION:  General: well developed and well nourished, alert, oriented times 3 and appears comfortable  Psychiatric: Normal  HEENT: Trachea Midline, No torticollis  Heart:  Regular rate and rhythm  Lungs:  Clear to auscultation  Pulmonary: No audible wheezing or respiratory distress   Skin: No masses, erythema, lacerations, fluctation, ulcerations  Neurovascular: Sensation Intact to the Median, Ulnar, Radial Nerve, Motor Intact to the Median, Ulnar, Radial Nerve and Pulses Intact    MUSCULOSKELETAL EXAMINATION:  Bilateral carpal tunnel exam:    Negative thenar atrophy  Positive Phalen's test   Positive carpal tunnel compression  Negative Tinel's over the median nerve at the wrist   Opposition strength 5/5  Abduction strength 5/5      Two point discrimination is 4 mm throughout on the left hand was 2nd guessing on the thumb, index finger  Right hand was not tested for 2 point discrimination    ___________________________________________________  STUDIES REVIEWED:  EMG bilateral upper extremity demonstrated moderate median nerve compression at the wrist with demyelinating changes right worse than left; moderate ulnar neuropathy at the elbow on the left  PROCEDURES PERFORMED:  Procedures  No Procedures performed today    _____________________________________________________  ASSESSMENT/PLAN:    Bilateral carpal tunnel syndrome, left> right  - it was discussed with the patient that we will proceed with right endoscopic carpal tunnel release as she is left-handed  We will then proceed with left endoscopic carpal tunnel release about 2 weeks after surgery  Detail consent was obtained for both sides   Surgery: right endoscopic carpal tunnel release   Anesthesia:  IV sedation   Antibiotics:  None   Medical clearance: not needed   Hand therapy: ordered   Consent: obtained   Post op pain medication sent to pharmacy today    Surgery medication instructions: You will stop eating and drinking at midnight the night before your surgery, but you may continue to take your normal medications with a small sip of water  In the morning on the day of your surgery, we would like you to take the following medication:   Tylenol 500mg one tablet by mouth    After surgery, we would like you to take Tylenol 500 mg one tablet by mouth every 6 hours  (at breakfast, lunch and dinner) for 5-7 days after your surgery  Please take this medication EVERYDAY after surgery for 5-7 days, and not just as needed  Taking this medications after surgery will limit your need for prescription pain medication  We will also prescribe a narcotic pain medication for a limited time after surgery that you can take as needed for moderate or severe pain  The narcotic pain medication may also have Tylenol in it    Please limit Tylenol usage to under 3,000mg a day  Diagnoses and all orders for this visit:    Bilateral carpal tunnel syndrome  -     Ambulatory referral to PT/OT hand therapy; Future  -     Case request operating room: RELEASE CARPAL TUNNEL ENDOSCOPIC Right; Standing  -     Case request operating room: RELEASE CARPAL TUNNEL ENDOSCOPIC Right  -     HYDROcodone-acetaminophen (NORCO) 5-325 mg per tablet; Take 1 tablet by mouth every 6 (six) hours as needed for painMax Daily Amount: 4 tablets    Other orders  -     Diet NPO; Sips with meds; Standing  -     Height and weight upon arrival; Standing  -     Void on call to OR; Standing  -     Insert peripheral IV; Standing          Follow Up:  Return for post op   Work/school status:  No restrictions    To Do Next Visit:  Re-evaluation of current issue    Operative Discussions:  Endoscopic Carpal Tunnel Release: The anatomy and physiology of carpal tunnel syndrome was discussed with the patient today  Increase pressure localized under the transverse carpal ligament can cause pain, numbness, tingling, or dysesthesias within the median nerve distribution as well as feelings of fatigue, clumsiness, or awkwardness  These symptoms typically occur at night and worse in the morning upon waking  Eventually, untreated carpal tunnel syndrome can result in weakness and permanent loss of muscle within the thenar compartment of the hand  Treatment options were discussed with the patient  Conservative treatment includes nocturnal resting splints to keep the nerve in a neutral position, ergonomic changes within the work or home environment, activity modification, and tendon gliding exercises  Vitamin B6 one tablet daily over the counter may helpful to reduce symptoms  Steroid injections within the carpal canal can help a majority of patients, however this is often self-limited in a majority of patients    Surgical intervention to divide the transverse carpal ligament typically results in a long-lasting relief of the patient's complaints, with the recurrence rate of less than 1%  The patient has elected to undergo an endoscopic carpal tunnel release  The single incision technique was discussed with the patient, which results in approximately a two-week recovery time less wound complications  In the postoperative period, light activities are allowed immediately, driving is allowed when narcotic medication has stopped, and the bandages may be removed and incision may get wet after 2 days  Heavy activities (lifting more than approximately 10 pounds) will be allowed after follow up appointment in 1-2 weeks  While night symptoms (waking from sleep, pain, and discomfort in the hands) generally improves rapidly, the numbness and tingling as well as the strength will slowly improve over weeks to months depending on the chronicity and severity of the carpal tunnel syndrome  Pillar pain and scar discomfort were discussed with the patient which are self-limiting conditions  The risks of bleeding and infection from the surgery are less than 1%  Risk of recurrence is approximately 0 5%  The risks of nerve injury or nerve damage or damage to the blood vessels is approximately 1 in 1200  The patient has an understanding of the above mentioned discussion  The risks and benefits of the procedure were explained to the patient, which include, but are not limited to: Bleeding, infection, recurrence, pain, scar, damage to tendons, damage to nerves, and damage to blood vessels, failure to give desired results and complications related to anesthesia  These risks, along with alternative conservative treatment options, and postoperative protocols were voiced back and understood by the patient  All questions were answered to the patient's satisfaction  The patient agrees to comply with a standard postoperative protocol, and is willing to proceed  Education was provided via written and auditory forms    There were no barriers to learning  Written handouts regarding wound care, incision and scar care, and general preoperative information was provided to the patient  Prior to surgery, the patient may be requested to stop all anti-inflammatory medications  Prophylactic aspirin, Plavix, and Coumadin may be allowed to be continued  Medications including vitamin E , ginkgo, &fish oil are requested to be stopped about one week      Scribe Attestation    I,:   Steve Mai PA-C am acting as a scribe while in the presence of the attending physician :        I,:   Fermin Osman MD personally performed the services described in this documentation    as scribed in my presence :

## 2020-03-03 ENCOUNTER — ANESTHESIA (OUTPATIENT)
Dept: PERIOP | Facility: HOSPITAL | Age: 55
End: 2020-03-03
Payer: COMMERCIAL

## 2020-03-03 ENCOUNTER — ANESTHESIA EVENT (OUTPATIENT)
Dept: PERIOP | Facility: HOSPITAL | Age: 55
End: 2020-03-03
Payer: COMMERCIAL

## 2020-03-03 ENCOUNTER — HOSPITAL ENCOUNTER (OUTPATIENT)
Facility: HOSPITAL | Age: 55
Setting detail: OUTPATIENT SURGERY
Discharge: HOME | End: 2020-03-03
Attending: ORTHOPAEDIC SURGERY | Admitting: ORTHOPAEDIC SURGERY
Payer: COMMERCIAL

## 2020-03-03 VITALS
RESPIRATION RATE: 18 BRPM | DIASTOLIC BLOOD PRESSURE: 57 MMHG | WEIGHT: 154.32 LBS | HEIGHT: 62 IN | BODY MASS INDEX: 28.4 KG/M2 | OXYGEN SATURATION: 97 % | SYSTOLIC BLOOD PRESSURE: 99 MMHG | TEMPERATURE: 96.9 F | HEART RATE: 68 BPM

## 2020-03-03 PROCEDURE — 29848 WRIST ENDOSCOPY/SURGERY: CPT | Performed by: ORTHOPAEDIC SURGERY

## 2020-03-03 PROCEDURE — 29848 WRIST ENDOSCOPY/SURGERY: CPT | Performed by: PHYSICIAN ASSISTANT

## 2020-03-03 RX ORDER — PROPOFOL 10 MG/ML
INJECTION, EMULSION INTRAVENOUS CONTINUOUS PRN
Status: DISCONTINUED | OUTPATIENT
Start: 2020-03-03 | End: 2020-03-03 | Stop reason: SURG

## 2020-03-03 RX ORDER — SODIUM CHLORIDE, SODIUM LACTATE, POTASSIUM CHLORIDE, CALCIUM CHLORIDE 600; 310; 30; 20 MG/100ML; MG/100ML; MG/100ML; MG/100ML
125 INJECTION, SOLUTION INTRAVENOUS CONTINUOUS
Status: DISCONTINUED | OUTPATIENT
Start: 2020-03-03 | End: 2020-03-03 | Stop reason: HOSPADM

## 2020-03-03 RX ORDER — PROPOFOL 10 MG/ML
INJECTION, EMULSION INTRAVENOUS AS NEEDED
Status: DISCONTINUED | OUTPATIENT
Start: 2020-03-03 | End: 2020-03-03 | Stop reason: SURG

## 2020-03-03 RX ORDER — ACETAMINOPHEN 325 MG/1
650 TABLET ORAL EVERY 6 HOURS PRN
Status: DISCONTINUED | OUTPATIENT
Start: 2020-03-03 | End: 2020-03-03 | Stop reason: HOSPADM

## 2020-03-03 RX ORDER — FENTANYL CITRATE/PF 50 MCG/ML
50 SYRINGE (ML) INJECTION
Status: CANCELLED | OUTPATIENT
Start: 2020-03-03

## 2020-03-03 RX ORDER — LIDOCAINE HYDROCHLORIDE 10 MG/ML
INJECTION, SOLUTION EPIDURAL; INFILTRATION; INTRACAUDAL; PERINEURAL AS NEEDED
Status: DISCONTINUED | OUTPATIENT
Start: 2020-03-03 | End: 2020-03-03 | Stop reason: SURG

## 2020-03-03 RX ORDER — TRAMADOL HYDROCHLORIDE 50 MG/1
50 TABLET ORAL EVERY 6 HOURS PRN
Status: DISCONTINUED | OUTPATIENT
Start: 2020-03-03 | End: 2020-03-03 | Stop reason: HOSPADM

## 2020-03-03 RX ORDER — DEXMEDETOMIDINE HYDROCHLORIDE 100 UG/ML
INJECTION, SOLUTION INTRAVENOUS AS NEEDED
Status: DISCONTINUED | OUTPATIENT
Start: 2020-03-03 | End: 2020-03-03 | Stop reason: SURG

## 2020-03-03 RX ORDER — ONDANSETRON 2 MG/ML
4 INJECTION INTRAMUSCULAR; INTRAVENOUS EVERY 6 HOURS PRN
Status: DISCONTINUED | OUTPATIENT
Start: 2020-03-03 | End: 2020-03-03 | Stop reason: HOSPADM

## 2020-03-03 RX ADMIN — LIDOCAINE HYDROCHLORIDE 50 MG: 10 INJECTION, SOLUTION EPIDURAL; INFILTRATION; INTRACAUDAL; PERINEURAL at 12:38

## 2020-03-03 RX ADMIN — PROPOFOL 100 MG: 10 INJECTION, EMULSION INTRAVENOUS at 12:38

## 2020-03-03 RX ADMIN — PROPOFOL 80 MCG/KG/MIN: 10 INJECTION, EMULSION INTRAVENOUS at 12:41

## 2020-03-03 RX ADMIN — SODIUM CHLORIDE, SODIUM LACTATE, POTASSIUM CHLORIDE, AND CALCIUM CHLORIDE 125 ML/HR: .6; .31; .03; .02 INJECTION, SOLUTION INTRAVENOUS at 11:06

## 2020-03-03 RX ADMIN — DEXMEDETOMIDINE HCL 8 MCG: 100 INJECTION INTRAVENOUS at 12:38

## 2020-03-03 NOTE — ANESTHESIA PREPROCEDURE EVALUATION
Review of Systems/Medical History  Patient summary reviewed  Chart reviewed  No history of anesthetic complications     Cardiovascular  Exercise tolerance (METS): >4,  Hypertension controlled, CAD , CAD status: non-obstructive,    Pulmonary  Smoker cigarette smoker  , Tobacco cessation counseling given , COPD moderate- medication dependent , No asthma ,        GI/Hepatic    GERD well controlled,             Endo/Other  No diabetes ,   Obesity    GYN       Hematology   Musculoskeletal    Arthritis     Neurology    Headaches, Fibromyalgia   Psychology   Depression , being treated for depression,              Physical Exam    Airway    Mallampati score: II  TM Distance: >3 FB  Neck ROM: full     Dental   No notable dental hx     Cardiovascular      Pulmonary      Other Findings        Anesthesia Plan  ASA Score- 3     Anesthesia Type- IV sedation with anesthesia with ASA Monitors  Additional Monitors:   Airway Plan:         Plan Factors- Patient instructed to abstain from smoking on day of procedure  Patient did not smoke on day of surgery  Induction- intravenous  Postoperative Plan-     Informed Consent- Anesthetic plan and risks discussed with patient  I personally reviewed this patient with the CRNA  Discussed and agreed on the Anesthesia Plan with the CRNA  Marco A Townsend

## 2020-03-03 NOTE — DISCHARGE INSTRUCTIONS
Post Operative Instructions    You have had surgery on your arm today, please read and follow the information below:  · Elevate your hand above your elbow during the next 24-48 hours to help with swelling  · Place your hand and arm over your head with motion at your shoulder three times a day  · Do not apply any cream/ointment/oil to your incisions including antibiotics  · Do not soak your hands in standing water (dishwater, tubs, Jacuzzi's, pools, etc ) until given permission (typically 2-3 weeks after injury)    Call the office at [phone]  if you notice any:  · Increased numbness or tingling of your hand or fingers that is not relieved with elevation  · Increasing pain that is not controlled with medication  · Difficulty chewing, breathing, swallowing  · Chest pains or shortness of breath  · Fever over 101 4 degrees  Bandage: Your therapist will remove your bandage at your first therapy appointment  Motion: Move fingers into a fist 5 times a day, DO NOT move any splinted fingers  Weight bearing status: Avoid heavy lifting (>5 pounds) with the extremity that was operated on until follow up appointment  Normal activities of daily living are OK  Ice: Ice for 10 minutes every hour as needed for swelling x 24 hours  Sling: No sling necessary  Pain medication: A prescription for pain medication was provided in the office and sent to your pharmacy  Your prescription pain medication also contains Tylenol  Please limit total Tylenol dose to under 3,000 mg a day  After surgery, we would like you to take Ibuprofen 600mg one tablet by mouth every 6 hours with food (at breakfast, lunch and dinner)  AND Tylenol 500 mg one tablet by mouth every 6 hours  (at breakfast, lunch and dinner) for 5-7 days after your surgery  Please take these medication EVERYDAY after surgery for 5-7 days, and not just as needed  You can take these medications at the same time    Taking these medications after surgery will limit your need for prescription pain medication  If the pain becomes severe, and the pain medication is not alleviating symptoms, The greatest source of pain after surgery is usually a tight dressing due to increased swelling after surgery  If the pain becomes severe after surgery, and the patient medication is NOT alleviating the symptoms, the patient should do the following: The patient should  loosen the top dressing (usually coban or an ace bandage) and loosen/cut the rolled gauze beneath  The 4x4 gauze that is directly covering the incision should remain in place  The splint (if the patient has one) should remain in place  The ace bandage/coban can then be replaced on top in a less constrictive manor  If this does not help relieve the pain/numbness in a few hours, the patient should call our office (number listed below)  and we can have them seen in the office for further evaluation  Follow-up Appointment: 7-10 days with Dr Octavia Balderas  Occupational Therapy: 3/6/2020  {remove bandage:63305::"AFTER THE FIRST THERAPY APPOINTMENT, the patient may remove the splint/dressing for showering and clean the incision with soap and water  Keep incision dry after washing  Do not expose the incision to dirty water (oceans, pools, hot tubs, etc) "}    If you need help scheduling Therapy, you can call [phone]        Please call the office at [phone] if you have any questions or concerns regarding your post-operative care      //

## 2020-03-03 NOTE — OP NOTE
OPERATIVE REPORT    PATIENT NAME: Lily Velasquez    MEDICAL RECORD NO:  [id number]    PROCEDURE DATE:  20    :  1965    SURGEON:  JOSEF Mccormack , Ph D     Marsha Luis: Shira Maxwell    No qualified resident was available to assist for this surgery  A Physician Assistant was used to hold the endoscope during the release of the transverse carpal ligament  PREOPERATIVE DIAGNOSIS:    right carpal tunnel syndrome      POSTOPERATIVE DIAGNOSIS:   right carpal tunnel syndrome     PROCEDURE PERFORMED:   right endoscopic carpal tunnel release     ANESTHESIA:  conscious sedation and local    COMPLICATIONS:  None    TOURNIQUET TIME: 1 minutes at 250 mmHg on the right    DISPOSITION: Patient was sent to the PACU in stable condition  INDICATION:  The patient is an 48 y o  female with clinical and/or electrodiagnostic evidence of right carpal tunnel syndrome  The patient had failed non-operative management and opted for carpal tunnel release  After informing the patient of the risks and benefits of endoscopic carpal tunnel release, consent was obtained for surgical intervention  PROCEDURE: The patient was identified in the preoperative screening area  The consent form was signed and verified after identifying the correct operative site  The patient was taken to the operating room and underwent conscious sedation and local   The right upper extremity was then prepped and draped in normal sterile fashion with Chlorhexidine solution  The right arm was then elevated, exsanguinated with an Esmarch and the tourniquet placed about the brachium was insufflated to 250 mmHg  The Esmarch was removed  A transverse incision, approximately 1 cm in length, was made just proximal to the distal wrist crease and just ulnar to the median nerve  The subcutaneous tissue was dissected bluntly down to the level of the forearm fascia   A transverse split in the fascia was created by gently piercing the fascia with the tips of tenotomy scissors  The proximal portion of the fascia was released longitudinally into the forearm using tenotomy scissors  The distal fascia was left intact for insertion of dilators  The carpal canal was then dilated using sequentially increasing sized dilators  Once the canal was adequately dilated, the scope and canula tray were then introduced into the carpal canal  The transverse carpal ligament was covered with a thin layer of synovial tissue on its undersurface  The synovial layer was debrided to expose the transverse fibers and the distal edge of the ligament  Once clear visualization of the transverse carpal ligament was obtained, the knife blade was introduced into the canula tray and the ligament was then released from distal to proximal maintaining complete visualization throughout the procedure  Several passes of the knife blade were necessary in some areas to complete the release  Complete release was verified with the endoscopic camera in place clearly visualizing the  cut edges of the transverse carpal ligament with the overlying volar fatty tissue and palmaris brevis muscle fibers protruding through  The transverse carpal ligament was moderately thickended  The scope and cannula were then removed and the wound was irrigated with copious amounts of saline solution  The tourniquet was released at 1 minutes with good capillary refill and color in the digits  The small incision was then repaired using #4-0 nylon sutures placed in an interrupted horizontal mattress fashion  The patient tolerated the procedure well  The incision was dressed in a sterile soft bandage  There were no complications during the case  The patient was sent to the PACU in stable condition        I was present for the entire procedure     Patient Disposition:  PACU      SIGNATURE: Denise Hernandez MD/PhD  DATE: 03/03/20  TIME:  12:53 PM

## 2020-03-03 NOTE — INTERVAL H&P NOTE
H&P reviewed  After examining the patient I find no changes in the patients condition since the H&P had been written      Vitals:    03/03/20 1103   BP: 155/71   Pulse: 68   Resp: 19   Temp: (!) 96 9 °F (36 1 °C)   SpO2: 99%

## 2020-03-03 NOTE — ANESTHESIA POSTPROCEDURE EVALUATION
Post-Op Assessment Note    CV Status:  Stable  Pain Score: 0    Pain management: adequate     Mental Status:  Sleepy   Hydration Status:  Stable   PONV Controlled:  None   Airway Patency:  Patent and adequate   Post Op Vitals Reviewed: Yes      Staff: CRNA           BP      Temp     Pulse     Resp      SpO2

## 2020-03-05 ENCOUNTER — OFFICE VISIT (OUTPATIENT)
Dept: OBGYN CLINIC | Facility: CLINIC | Age: 55
End: 2020-03-05
Payer: COMMERCIAL

## 2020-03-05 VITALS
BODY MASS INDEX: 28.34 KG/M2 | HEIGHT: 62 IN | HEART RATE: 69 BPM | SYSTOLIC BLOOD PRESSURE: 125 MMHG | DIASTOLIC BLOOD PRESSURE: 73 MMHG | WEIGHT: 154 LBS

## 2020-03-05 DIAGNOSIS — M17.11 PRIMARY OSTEOARTHRITIS OF RIGHT KNEE: Primary | ICD-10-CM

## 2020-03-05 DIAGNOSIS — M17.0 PRIMARY OSTEOARTHRITIS OF BOTH KNEES: ICD-10-CM

## 2020-03-05 PROCEDURE — 99213 OFFICE O/P EST LOW 20 MIN: CPT | Performed by: ORTHOPAEDIC SURGERY

## 2020-03-05 RX ORDER — ASCORBIC ACID 500 MG
500 TABLET ORAL DAILY
Qty: 30 TABLET | Refills: 0 | Status: SHIPPED | OUTPATIENT
Start: 2020-03-05 | End: 2020-04-01

## 2020-03-05 RX ORDER — CHLORHEXIDINE GLUCONATE 0.12 MG/ML
15 RINSE ORAL ONCE
Status: CANCELLED | OUTPATIENT
Start: 2020-03-05 | End: 2020-03-05

## 2020-03-05 RX ORDER — CHLORHEXIDINE GLUCONATE 4 G/100ML
SOLUTION TOPICAL DAILY PRN
Status: CANCELLED | OUTPATIENT
Start: 2020-03-05

## 2020-03-05 RX ORDER — LANOLIN ALCOHOL/MO/W.PET/CERES
400 CREAM (GRAM) TOPICAL DAILY
Qty: 30 TABLET | Refills: 0 | Status: SHIPPED | OUTPATIENT
Start: 2020-03-05 | End: 2020-04-01

## 2020-03-05 RX ORDER — FERROUS SULFATE TAB EC 324 MG (65 MG FE EQUIVALENT) 324 (65 FE) MG
324 TABLET DELAYED RESPONSE ORAL
Qty: 60 TABLET | Refills: 0 | Status: SHIPPED | OUTPATIENT
Start: 2020-03-05

## 2020-03-05 RX ORDER — MULTIVITAMIN
1 TABLET ORAL DAILY
Qty: 30 TABLET | Refills: 1 | Status: SHIPPED | OUTPATIENT
Start: 2020-03-05 | End: 2020-04-02

## 2020-03-05 NOTE — PROGRESS NOTES
Assessment:  1  Primary osteoarthritis of right knee  Case request operating room: ARTHROPLASTY KNEE TOTAL    Comprehensive metabolic panel    Hemoglobin A1C W/EAG Estimation    CBC and differential    Iron Panel (Includes Iron Saturation, Iron, and TIBC)    C-reactive protein    Protime-INR    APTT    Type and screen    Ambulatory referral to Family Practice    Ambulatory referral to Physical Therapy    EKG 12 lead    XR chest pa & lateral    Case request operating room: ARTHROPLASTY KNEE TOTAL    ascorbic acid (VITAMIN C) 401 mg tablet    folic acid (FOLVITE) 021 mcg tablet    ferrous sulfate 324 (65 Fe) mg    Multiple Vitamin (MULTIVITAMIN) tablet    enoxaparin (LOVENOX) 40 mg/0 4 mL   2  Primary osteoarthritis of both knees  Nicotine, Blood    COTININE     Patient Active Problem List   Diagnosis    Pain in both knees    Primary osteoarthritis of both knees    Bilateral carpal tunnel syndrome           Plan      A knee replacement right knee patient had a lubricant injection at the beginning of January so we are going to wait until April to do this knee  But she also has a surgery on her wrist coming up in 2 weeks so going to wait from 4 weeks from that surgery before we even attempted do surgery so the earliest we would do the surgery is approximately April 24th  She will need medical clearance and medical optimization prior to surgery  I did talk to her about her list of past medical history which is still on her problem list active problem list I talked her about talking with her family doctor about resolving these if she is no longer a diabetic may be we can remove these from her problem list   Also I also discussed with her the fibromyalgia of the being on the Cymbalta and that the arthritis is definitely a pain that we can improve with the surgery but I will not be able to improve all of her pain around the leg    She does smoke we will need to have nicotine and codeine tests on her when she is clear of the nicotine and cone in we can proceed with the surgery  We will do this test early to mid April  This will give us a more updated version of her nicotine and codeine test closer to surgical time  I have also informed her that nicotine patches only transplanted nicotine they did not removed nicotine so we want to see these nicotine and company test very low if not 0 to be able to proceed  I have talked to her about talking with her family physician about help with quitting smoking  We will schedule her for the surgery at the end of April but everything as to come as planned I want to see her back in the office before this scheduled surgery to A go over the plan B repeat an updated x-ray and capital letter C make sure we fit the criteria to proceed          Subjective:     Patient ID:    Chief Complaint:Neela Harrison 48 y o  female      HPI    Patient comes in today with bilateral knee pain  More significant on the right than on the left we gave her lubricant injections last 1 in at the beginning of January the left 1 is doing well the right 1 really had no improvement but she does have more significant arthritis on the right knee  Patient also has a past medical history of high blood pressure fibromyalgia COPD and a history of diabetes but no longer is diabetic due to the fact that she is not on any medications and her blood sugars are no longer high  She does smoke she was up this 2 packs a day but is now currently less than a pack a day        The following portions of the patient's history were reviewed and updated as appropriate: allergies, current medications, past family history, past social history, past surgical history and problem list     All organ systems normal    Social History     Socioeconomic History    Marital status: /Civil Union     Spouse name: Not on file    Number of children: Not on file    Years of education: Not on file    Highest education level: Not on file Occupational History    Not on file   Social Needs    Financial resource strain: Not on file    Food insecurity:     Worry: Not on file     Inability: Not on file    Transportation needs:     Medical: Not on file     Non-medical: Not on file   Tobacco Use    Smoking status: Current Every Day Smoker     Packs/day: 0 50     Types: Cigarettes    Smokeless tobacco: Never Used   Substance and Sexual Activity    Alcohol use: No    Drug use: No    Sexual activity: Not on file   Lifestyle    Physical activity:     Days per week: Not on file     Minutes per session: Not on file    Stress: Not on file   Relationships    Social connections:     Talks on phone: Not on file     Gets together: Not on file     Attends Religion service: Not on file     Active member of club or organization: Not on file     Attends meetings of clubs or organizations: Not on file     Relationship status: Not on file    Intimate partner violence:     Fear of current or ex partner: Not on file     Emotionally abused: Not on file     Physically abused: Not on file     Forced sexual activity: Not on file   Other Topics Concern    Not on file   Social History Narrative    Not on file     Past Medical History:   Diagnosis Date    Arthritis     Asthma     [facility]ch[facility] obstructive [facility] disease) ([facility]     Coronary artery disease     Depression     Diabetes mellitus ([facility] )     with weight loss no longer has high BS    Diverticulitis     Fibromyalgia     Fibromyalgia, primary     GERD (gastroesophageal reflux disease)     Hypertension     lost weight and no longer has    Migraine     Pancreatitis     Stomach problems      Past Surgical History:   Procedure Laterality Date    ABDOMINAL SURGERY      CHOLECYSTECTOMY      GALLBLADDER SURGERY      GASTRIC BYPASS      PANNICULECTOMY  2017    RI WRIST Bridger Cope LIG Right 3/3/2020    Procedure: RELEASE CARPAL TUNNEL ENDOSCOPIC Right;  Surgeon: Hemanth Alexander MD; Location: [facility];  Service: Orthopedics    STOMACH SURGERY      tummy tuck    TUBAL LIGATION       No Known Allergies  Current Outpatient Medications on File Prior to Visit   Medication Sig Dispense Refill    acetaminophen (TYLENOL) 650 mg CR tablet Take 1 tablet by mouth every 8 (eight) hours      albuterol (2 5 mg/3 mL) 0 083 % nebulizer solution Take 2 5 mg by nebulization 4 (four) times a day      albuterol (PROVENTIL HFA,VENTOLIN HFA) 90 mcg/act inhaler Inhale 2 puffs every 6 (six) hours as needed for wheezing      budesonide-formoterol (SYMBICORT) 160-4 5 mcg/act inhaler Inhale 2 puffs 2 (two) times a day      celecoxib (CeleBREX) 100 mg capsule Take 1 capsule (100 mg total) by mouth 2 (two) times a day 60 capsule 3    cyclobenzaprine (FLEXERIL) 10 mg tablet Take 1 tablet (10 mg total) by mouth daily at bedtime 30 tablet 3    DULoxetine (CYMBALTA) 30 mg delayed release capsule Take 30 mg by mouth daily      ergocalciferol (VITAMIN D2) 50,000 units Take 50,000 Units by mouth once a week      gabapentin (NEURONTIN) 600 MG tablet Take 600 mg by mouth 3 (three) times a day      HYDROcodone-acetaminophen (NORCO) 5-325 mg per tablet Take 1 tablet by mouth every 6 (six) hours as needed for painMax Daily Amount: 4 tablets 20 tablet 0    ipratropium (ATROVENT) 0 02 % nebulizer solution Inhale 0 25 mg every 6 (six) hours as needed      magnesium oxide (MAG-OX) 400 mg Take 400 mg by mouth 2 (two) times a day      mometasone (ASMANEX TWISTHALER) 220 MCG/INH inhaler Inhale 220 mcg daily      NON FORMULARY       ondansetron (ZOFRAN-ODT) 4 mg disintegrating tablet ondansetron 4 mg disintegrating tablet      pantoprazole (PROTONIX) 40 mg tablet 40 mg      predniSONE 10 mg tablet Take 4 tablets for 3 days then take 3 tablets for 3 days then take 2 tablets for 3 days then take 1 tablet for 3 days      tiotropium (SPIRIVA RESPIMAT) 2 5 MCG/ACT AERS inhaler Inhale daily      tiotropium-olodaterol (STIOLTO RESPIMAT) 2 5-2 5 MCG/ACT inhaler Inhale 2 puffs daily      topiramate (TOPAMAX) 100 mg tablet Take 100 mg by mouth 2 (two) times a day       No current facility-administered medications on file prior to visit  Objective:        Ortho Exam  No change in exam    No effusion no shortness of breath pulse regular  I have personally reviewed pertinent films in PACS  Portions of the record may have been created with voice recognition software   Occasional wrong word or "sound a like" substitutions may have occurred due to the inherent limitations of voice recognition software   Read the chart carefully and recognize, using context, where substitutions have occurred

## 2020-03-06 ENCOUNTER — EVALUATION (OUTPATIENT)
Dept: OCCUPATIONAL THERAPY | Facility: CLINIC | Age: 55
End: 2020-03-06
Payer: COMMERCIAL

## 2020-03-06 DIAGNOSIS — G56.03 BILATERAL CARPAL TUNNEL SYNDROME: ICD-10-CM

## 2020-03-06 DIAGNOSIS — M17.11 PRIMARY OSTEOARTHRITIS OF RIGHT KNEE: Primary | ICD-10-CM

## 2020-03-06 PROCEDURE — 97165 OT EVAL LOW COMPLEX 30 MIN: CPT

## 2020-03-06 PROCEDURE — 97110 THERAPEUTIC EXERCISES: CPT

## 2020-03-06 NOTE — PROGRESS NOTES
OT Evaluation     Today's date: 3/6/2020  Patient name: Lily Velasquez  : 1965  MRN: [id number]  Referring provider: Katia Costello PA-C  Dx:   Encounter Diagnosis     ICD-10-CM    1  Bilateral carpal tunnel syndrome G56 03 Ambulatory referral to PT/OT hand therapy                  Assessment  Assessment details: This is a 48year old, ambidextrous female being seen for b/l CTS  She had a right endoscopic carpal tunnel release on 3/3/20  Patient presented in post op dressing  Dressing was removed  Sutures are intact and no redness or drainage noted from incision  Patient has mild deficits in right wrist and thumb AROM  Mild edema noted at the wrist   Strength was not tested  Sensation in the right hand is now intact and patient reports she no longer wakes at night from numbness and pain in the right hand  She does report moderate pillar pain  Patient educated in wound care and AROM for the digits and wrist   She was also instructed in massage of her palm for pillar pain  Therapy is on hold until she gets ECTR on the left wrist in 1-2 weeks  ** Patient did report she has had suicidal thoughts in the past due to her multiple medical conditions  She denies feeling suicidal at this time  Patient given number for suicide prevention  Therapist contacted patient's PCP regarding this issue**  Impairments: abnormal or restricted ROM, activity intolerance, lacks appropriate home exercise program, pain with function and weight-bearing intolerance  Other impairment: Healing incision  Functional limitations: Impaired ability to lift and grasp with the right hand  Using the right hand as a minimal assist for self care tasksBarriers to therapy: Chronic pain, fibromyalgia  Understanding of Dx/Px/POC: excellent   Prognosis: good    Goals  STGs ( 4 weeks)  1  Patient will be independent in [facility] for wound care and ROM  2  Patient will demonstrate full wound healing right wrist  3    Patient will demonstrate AROM right hand and wrist WNL for be independent in self care  4  Patient will report a maximum pain level of 2/10 in the right wrist during daily activities  LTGs ( 8 weeks)  1  Patient will be independent in HEP of scar mgt, wound care, desensitization, ROM for b/l hands at discharge  2  Patient will demonstrate maximum pain level 2-3/10 in the left hand during daily tasks    Plan  Patient would benefit from: OT eval and skilled occupational therapy  Planned modality interventions: thermotherapy: hydrocollator packs  Planned therapy interventions: activity modification, dressing changes, fine motor coordination training, graded activity, graded exercise, home exercise program, therapeutic exercise, therapeutic activities, stretching, strengthening, patient education, neuromuscular re-education and manual therapy  Other planned therapy interventions: Desensitization; scar and wound care  Frequency: 1x week  Duration in weeks: 8  Plan of Care beginning date: 3/6/2020  Plan of Care expiration date: 2020  Treatment plan discussed with: patient        Subjective Evaluation    History of Present Illness  Onset date: ~15 years ago  Date of surgery: 3/3/2020  Mechanism of injury: surgery  Mechanism of injury: Patient reports she has had bilateral hand carpal tunnel for approximately 15 years  She reported experiencing b/l night time pain and numbness  She wore splints at night with only fair results  She had multiple CSI with no improvement  Patient also has medical history of fibromyalgia, back pain migraines, knee arthritis, COPD and asthma      She now presents for OT following ECTR of the right CT on 3/3/20  Patient reports she no longer has night time parasthesia in the right hand            Recurrent probem    Quality of life: fair    Pain  Current pain ratin  At best pain ratin  At worst pain rating: 10  Location: Pillar discomfort and pain at incision  Quality: dull ache  Relieving factors: medications  Aggravating factors: nothing  Progression: improved    Social Support  Lives with: spouse, adult children and young children    Employment status: not working (Disability)  Hand dominance: ambidextrous      Diagnostic Tests  EMG: abnormal  Treatments  Previous treatment: injection treatment, immobilization and medication  Patient Goals  Patient goals for therapy: decreased edema, decreased pain, increased motion, increased strength, independence with ADLs/IADLs and return to sport/leisure activities  Patient goal: Sleep through the night        Objective     Observations     Right Wrist/Hand   Positive for edema and incision  Additional Observation Details  3/6/20:  Patient presented in post op dressing  Dried blood on post op dressing, but no current drainage    Palpation     Additional Palpation Details  3/6/20:  Tender at incision and has pillar discomfort    Neurological Testing     Sensation     Wrist/Hand     Right   Intact: static two point discrimination    Comments   Right static two point discrimination: 6 mm    Active Range of Motion     Right Wrist   Wrist flexion: 60 degrees   Wrist extension: 60 degrees   Radial deviation: 25 degrees WFL  Ulnar deviation: 30 degrees WFL    Right Thumb   Flexion     CMC: 20    MP: 35    DIP: 50  Extension     CMC: 40  Palmar Abduction    CMC: 45  Radial Abduction    CMC: 40  Opposition: 3/6/20:  Opposition to tip of small finger  GUPTA = 105    Additional Active Range of Motion Details  3/6/20:  AROM digits 2-5 is WNL    Swelling     Left Wrist/Hand   Circumference wrist: 16 2 cm    Right Wrist/Hand   Circumference wrist: 16 7 cm      Flowsheet Rows      Most Recent Value   PT/OT G-Codes   Current Score  49   Projected Score  57   FOTO information reviewed  Yes   Assessment Type  Evaluation             Precautions: wound, chronic pain              Manual  3/6       Wound care/dressing change Removed R post op dressing    Tubigrip sleeve dressing only STM Right palm 3'                                   Exercise Diary  3/6       HEP ROM, STM, wound care       TGE x10       AROM thumb x10 all planes       AROM wrist x10 all planes                                                                                                                                           Modalities

## 2020-03-12 ENCOUNTER — OFFICE VISIT (OUTPATIENT)
Dept: OBGYN CLINIC | Facility: CLINIC | Age: 55
End: 2020-03-12

## 2020-03-12 VITALS
DIASTOLIC BLOOD PRESSURE: 83 MMHG | HEART RATE: 67 BPM | HEIGHT: 62 IN | SYSTOLIC BLOOD PRESSURE: 130 MMHG | BODY MASS INDEX: 28.34 KG/M2 | WEIGHT: 154 LBS

## 2020-03-12 DIAGNOSIS — G56.03 BILATERAL CARPAL TUNNEL SYNDROME: Primary | ICD-10-CM

## 2020-03-12 PROCEDURE — 99024 POSTOP FOLLOW-UP VISIT: CPT | Performed by: ORTHOPAEDIC SURGERY

## 2020-03-12 NOTE — PROGRESS NOTES
SUBJECTIVE:  Amina Beckman is a 48y o  year old female who presents for follow up after surgery, RECT performed on  3/3/2020  Today patient states that she no longer has any right-sided numbness and tingling since surgery  Pt continues to have left sided numbness and tingling that wakes her from sleep at night  The patient denies any cardiac  She does have a history of COPD  Denies diabetes  Denies any history of MI, gastric ulcers, kidney or liver issues  Denies blood thinners  She had gastric bypass surgery as well as has fibromyalgia  VITALS:  Vitals:    03/12/20 0947   BP: 130/83   Pulse: 67       PHYSICAL EXAMINATION:  General: alert, oriented times 3 and appears comfortable  Psychiatric: Normal  Cardiac:  Regular rate and rhythm  Pulmonary: No respiratory distress  Lung sounds clear to auscultation  MUSCULOSKELETAL EXAMINATION:  Right wrist   Incision: Clean, dry, with sutures intact  Range of Motion: good motion of wrist and fingers   Neurovascular status: Neuro intact, good cap refill       Left carpal tunnel exam  Negative thenar atrophy  Positive Phalen's test  Positive carpal tunnel compression  Negative Tinel's over the median nerve at the wrist  Opposition strength 5/5 abduction strength 5 in had  Two point discrimination is 4 mm throughout on the left hand but was 2nd casting on the thumb and index finger      STUDIES REVIEWED:  No studies reviewed         PROCEDURES PERFORMED:  Procedures  No Procedures performed today      ASSESSMENT/PLAN:    S/P right ECTR   Sutures were removed today without complications   Pt was advised that they may have increased pain in the palm of the hand due to scar tissue formation and this discomfort will increase before it subsides   Pt was advised to call the office if they have any questions or concerns     Left Carpal tunnel   ECTR was discussed at length today including the risks and benefits, Pt would like to proceed with the surgery                 *Surgery- leftEndoscopic carpal tunnel release                 * detailed consent was signed                 * anesthesia- local with sedation                 * PT/OT was ordered for wound care                 * Post op medication from previous sx may be taken  postoperatively, Pt was advised to call the office if she has  continued pain after the medication runs out    Surgery medication instructions: You will stop eating and drinking at midnight the night before your surgery, but you may continue to take your normal medications with a small sip of water  In the morning on the day of your surgery, we would like you to take the following medication:   Tylenol 500mg one tablet by mouth    After surgery, we would like you to take Tylenol 500 mg one tablet by mouth every 6 hours  (at breakfast, lunch and dinner) for 5-7 days after your surgery  Please take this medication EVERYDAY after surgery for 5-7 days, and not just as needed  Taking this medications after surgery will limit your need for prescription pain medication  Patient may also take the previously prescribed narcotic pain medication for a limited time after surgery that you can take as needed for moderate or severe pain  The narcotic pain medication may also have Tylenol in it  Please limit Tylenol usage to under 3,000mg a day  FOLLOW UP:  Return for after surgery  TO DO AT NEXT VISIT:  Re-evaluation of current issue    Operative Discussions:  Endoscopic Carpal Tunnel Release: The anatomy and physiology of carpal tunnel syndrome was discussed with the patient today  Increase pressure localized under the transverse carpal ligament can cause pain, numbness, tingling, or dysesthesias within the median nerve distribution as well as feelings of fatigue, clumsiness, or awkwardness  These symptoms typically occur at night and worse in the morning upon waking    Eventually, untreated carpal tunnel syndrome can result in weakness and permanent loss of muscle within the thenar compartment of the hand  Treatment options were discussed with the patient  Conservative treatment includes nocturnal resting splints to keep the nerve in a neutral position, ergonomic changes within the work or home environment, activity modification, and tendon gliding exercises  Vitamin B6 one tablet daily over the counter may helpful to reduce symptoms  Steroid injections within the carpal canal can help a majority of patients, however this is often self-limited in a majority of patients  Surgical intervention to divide the transverse carpal ligament typically results in a long-lasting relief of the patient's complaints, with the recurrence rate of less than 1%  The patient has elected to undergo an endoscopic carpal tunnel release  The single incision technique was discussed with the patient, which results in approximately a two-week recovery time less wound complications  In the postoperative period, light activities are allowed immediately, driving is allowed when narcotic medication has stopped, and the bandages may be removed and incision may get wet after 2 days  Heavy activities (lifting more than approximately 10 pounds) will be allowed after follow up appointment in 1-2 weeks  While night symptoms (waking from sleep, pain, and discomfort in the hands) generally improves rapidly, the numbness and tingling as well as the strength will slowly improve over weeks to months depending on the chronicity and severity of the carpal tunnel syndrome  Pillar pain and scar discomfort were discussed with the patient which are self-limiting conditions  The risks of bleeding and infection from the surgery are less than 1%  Risk of recurrence is approximately 0 5%  The risks of nerve injury or nerve damage or damage to the blood vessels is approximately 1 in 1200  The patient has an understanding of the above mentioned discussion  The risks and benefits of the procedure were explained to the patient, which include, but are not limited to: Bleeding, infection, recurrence, pain, scar, damage to tendons, damage to nerves, and damage to blood vessels, failure to give desired results and complications related to anesthesia  These risks, along with alternative conservative treatment options, and postoperative protocols were voiced back and understood by the patient  All questions were answered to the patient's satisfaction  The patient agrees to comply with a standard postoperative protocol, and is willing to proceed  Education was provided via written and auditory forms  There were no barriers to learning  Written handouts regarding wound care, incision and scar care, and general preoperative information was provided to the patient  Prior to surgery, the patient may be requested to stop all anti-inflammatory medications  Prophylactic aspirin, Plavix, and Coumadin may be allowed to be continued  Medications including vitamin E , ginkgo, &fish oil are requested to be stopped about one week      Scribe Attestation    I,:   Shawn Woods am acting as a scribe while in the presence of the attending physician :        I,:   Burak Parra MD personally performed the services described in this documentation    as scribed in my presence :

## 2020-03-12 NOTE — PATIENT INSTRUCTIONS
Surgery medication instructions: You will stop eating and drinking at midnight the night before your surgery, but you may continue to take your normal medications with a small sip of water  In the morning on the day of your surgery, we would like you to take the following medication:   Tylenol 500mg one tablet by mouth    After surgery, we would like you to take Tylenol 500 mg one tablet by mouth every 6 hours  (at breakfast, lunch and dinner) for 5-7 days after your surgery  Please take this medication EVERYDAY after surgery for 5-7 days, and not just as needed  Taking this medications after surgery will limit your need for prescription pain medication  You  may also take the previously prescribed medication for a limited time after surgery that you can take as needed for moderate or severe pain  The narcotic pain medication may also have Tylenol in it  Please limit Tylenol usage to under 3,000mg a day

## 2020-03-17 NOTE — H&P (VIEW-ONLY)
SUBJECTIVE:  Dolly Avendano is a 48y o  year old female who presents for follow up after surgery, RECT performed on  3/3/2020  Today patient states that she no longer has any right-sided numbness and tingling since surgery  Pt continues to have left sided numbness and tingling that wakes her from sleep at night  The patient denies any cardiac  She does have a history of COPD  Denies diabetes  Denies any history of MI, gastric ulcers, kidney or liver issues  Denies blood thinners  She had gastric bypass surgery as well as has fibromyalgia  VITALS:  There were no vitals filed for this visit  PHYSICAL EXAMINATION:  General: alert, oriented times 3 and appears comfortable  Psychiatric: Normal  Cardiac:  Regular rate and rhythm  Pulmonary: No respiratory distress  Lung sounds clear to auscultation  MUSCULOSKELETAL EXAMINATION:  Right wrist   Incision: Clean, dry, with sutures intact  Range of Motion: good motion of wrist and fingers   Neurovascular status: Neuro intact, good cap refill       Left carpal tunnel exam  Negative thenar atrophy  Positive Phalen's test  Positive carpal tunnel compression  Negative Tinel's over the median nerve at the wrist  Opposition strength 5/5 abduction strength 5 in had  Two point discrimination is 4 mm throughout on the left hand but was 2nd casting on the thumb and index finger      STUDIES REVIEWED:  No studies reviewed         PROCEDURES PERFORMED:  Procedures  No Procedures performed today      ASSESSMENT/PLAN:    S/P right ECTR   Sutures were removed today without complications   Pt was advised that they may have increased pain in the palm of the hand due to scar tissue formation and this discomfort will increase before it subsides   Pt was advised to call the office if they have any questions or concerns     Left Carpal tunnel   ECTR was discussed at length today including the risks and benefits, Pt would like to proceed with the surgery *Surgery- leftEndoscopic carpal tunnel release                 * detailed consent was signed                 * anesthesia- local with sedation                 * PT/OT was ordered for wound care                 * Post op medication from previous sx may be taken  postoperatively, Pt was advised to call the office if she has  continued pain after the medication runs out    Surgery medication instructions: You will stop eating and drinking at midnight the night before your surgery, but you may continue to take your normal medications with a small sip of water  In the morning on the day of your surgery, we would like you to take the following medication:   Tylenol 500mg one tablet by mouth    After surgery, we would like you to take Tylenol 500 mg one tablet by mouth every 6 hours  (at breakfast, lunch and dinner) for 5-7 days after your surgery  Please take this medication EVERYDAY after surgery for 5-7 days, and not just as needed  Taking this medications after surgery will limit your need for prescription pain medication  Patient may also take the previously prescribed narcotic pain medication for a limited time after surgery that you can take as needed for moderate or severe pain  The narcotic pain medication may also have Tylenol in it  Please limit Tylenol usage to under 3,000mg a day  FOLLOW UP:  No follow-ups on file  TO DO AT NEXT VISIT:  Re-evaluation of current issue    Operative Discussions:  Endoscopic Carpal Tunnel Release: The anatomy and physiology of carpal tunnel syndrome was discussed with the patient today  Increase pressure localized under the transverse carpal ligament can cause pain, numbness, tingling, or dysesthesias within the median nerve distribution as well as feelings of fatigue, clumsiness, or awkwardness  These symptoms typically occur at night and worse in the morning upon waking    Eventually, untreated carpal tunnel syndrome can result in weakness and permanent loss of muscle within the thenar compartment of the hand  Treatment options were discussed with the patient  Conservative treatment includes nocturnal resting splints to keep the nerve in a neutral position, ergonomic changes within the work or home environment, activity modification, and tendon gliding exercises  Vitamin B6 one tablet daily over the counter may helpful to reduce symptoms  Steroid injections within the carpal canal can help a majority of patients, however this is often self-limited in a majority of patients  Surgical intervention to divide the transverse carpal ligament typically results in a long-lasting relief of the patient's complaints, with the recurrence rate of less than 1%  The patient has elected to undergo an endoscopic carpal tunnel release  The single incision technique was discussed with the patient, which results in approximately a two-week recovery time less wound complications  In the postoperative period, light activities are allowed immediately, driving is allowed when narcotic medication has stopped, and the bandages may be removed and incision may get wet after 2 days  Heavy activities (lifting more than approximately 10 pounds) will be allowed after follow up appointment in 1-2 weeks  While night symptoms (waking from sleep, pain, and discomfort in the hands) generally improves rapidly, the numbness and tingling as well as the strength will slowly improve over weeks to months depending on the chronicity and severity of the carpal tunnel syndrome  Pillar pain and scar discomfort were discussed with the patient which are self-limiting conditions  The risks of bleeding and infection from the surgery are less than 1%  Risk of recurrence is approximately 0 5%  The risks of nerve injury or nerve damage or damage to the blood vessels is approximately 1 in 1200  The patient has an understanding of the above mentioned discussion  The risks and benefits of the procedure were explained to the patient, which include, but are not limited to: Bleeding, infection, recurrence, pain, scar, damage to tendons, damage to nerves, and damage to blood vessels, failure to give desired results and complications related to anesthesia  These risks, along with alternative conservative treatment options, and postoperative protocols were voiced back and understood by the patient  All questions were answered to the patient's satisfaction  The patient agrees to comply with a standard postoperative protocol, and is willing to proceed  Education was provided via written and auditory forms  There were no barriers to learning  Written handouts regarding wound care, incision and scar care, and general preoperative information was provided to the patient  Prior to surgery, the patient may be requested to stop all anti-inflammatory medications  Prophylactic aspirin, Plavix, and Coumadin may be allowed to be continued  Medications including vitamin E , ginkgo, &fish oil are requested to be stopped about one week

## 2020-03-18 ENCOUNTER — HOSPITAL ENCOUNTER (OUTPATIENT)
Facility: HOSPITAL | Age: 55
Setting detail: OUTPATIENT SURGERY
Discharge: HOME | End: 2020-03-18
Attending: ORTHOPAEDIC SURGERY | Admitting: ORTHOPAEDIC SURGERY
Payer: COMMERCIAL

## 2020-03-18 ENCOUNTER — ANESTHESIA (OUTPATIENT)
Dept: PERIOP | Facility: HOSPITAL | Age: 55
End: 2020-03-18
Payer: COMMERCIAL

## 2020-03-18 ENCOUNTER — ANESTHESIA EVENT (OUTPATIENT)
Dept: PERIOP | Facility: HOSPITAL | Age: 55
End: 2020-03-18
Payer: COMMERCIAL

## 2020-03-18 VITALS
RESPIRATION RATE: 14 BRPM | OXYGEN SATURATION: 96 % | WEIGHT: 155.42 LBS | DIASTOLIC BLOOD PRESSURE: 70 MMHG | SYSTOLIC BLOOD PRESSURE: 131 MMHG | HEART RATE: 67 BPM | TEMPERATURE: 97.4 F | HEIGHT: 62 IN | BODY MASS INDEX: 28.6 KG/M2

## 2020-03-18 LAB — GLUCOSE SERPL-MCNC: 70 MG/DL (ref 65–140)

## 2020-03-18 PROCEDURE — 82948 REAGENT STRIP/BLOOD GLUCOSE: CPT

## 2020-03-18 PROCEDURE — 29848 WRIST ENDOSCOPY/SURGERY: CPT | Performed by: PHYSICIAN ASSISTANT

## 2020-03-18 PROCEDURE — 29848 WRIST ENDOSCOPY/SURGERY: CPT | Performed by: ORTHOPAEDIC SURGERY

## 2020-03-18 RX ORDER — ONDANSETRON 2 MG/ML
4 INJECTION INTRAMUSCULAR; INTRAVENOUS EVERY 6 HOURS PRN
Status: DISCONTINUED | OUTPATIENT
Start: 2020-03-18 | End: 2020-03-18 | Stop reason: HOSPADM

## 2020-03-18 RX ORDER — TRAMADOL HYDROCHLORIDE 50 MG/1
50 TABLET ORAL EVERY 6 HOURS PRN
Status: DISCONTINUED | OUTPATIENT
Start: 2020-03-18 | End: 2020-03-18 | Stop reason: HOSPADM

## 2020-03-18 RX ORDER — FENTANYL CITRATE 50 UG/ML
INJECTION, SOLUTION INTRAMUSCULAR; INTRAVENOUS AS NEEDED
Status: DISCONTINUED | OUTPATIENT
Start: 2020-03-18 | End: 2020-03-18 | Stop reason: SURG

## 2020-03-18 RX ORDER — PROPOFOL 10 MG/ML
INJECTION, EMULSION INTRAVENOUS CONTINUOUS PRN
Status: DISCONTINUED | OUTPATIENT
Start: 2020-03-18 | End: 2020-03-18 | Stop reason: SURG

## 2020-03-18 RX ORDER — LIDOCAINE HYDROCHLORIDE 20 MG/ML
INJECTION, SOLUTION EPIDURAL; INFILTRATION; INTRACAUDAL; PERINEURAL AS NEEDED
Status: DISCONTINUED | OUTPATIENT
Start: 2020-03-18 | End: 2020-03-18 | Stop reason: SURG

## 2020-03-18 RX ORDER — PROPOFOL 10 MG/ML
INJECTION, EMULSION INTRAVENOUS AS NEEDED
Status: DISCONTINUED | OUTPATIENT
Start: 2020-03-18 | End: 2020-03-18 | Stop reason: SURG

## 2020-03-18 RX ORDER — MAGNESIUM HYDROXIDE 1200 MG/15ML
LIQUID ORAL AS NEEDED
Status: DISCONTINUED | OUTPATIENT
Start: 2020-03-18 | End: 2020-03-18 | Stop reason: HOSPADM

## 2020-03-18 RX ORDER — DICYCLOMINE HCL 20 MG
20 TABLET ORAL DAILY
COMMUNITY

## 2020-03-18 RX ORDER — SODIUM CHLORIDE, SODIUM LACTATE, POTASSIUM CHLORIDE, CALCIUM CHLORIDE 600; 310; 30; 20 MG/100ML; MG/100ML; MG/100ML; MG/100ML
INJECTION, SOLUTION INTRAVENOUS CONTINUOUS PRN
Status: DISCONTINUED | OUTPATIENT
Start: 2020-03-18 | End: 2020-03-18 | Stop reason: SURG

## 2020-03-18 RX ORDER — ACETAMINOPHEN 325 MG/1
650 TABLET ORAL EVERY 6 HOURS PRN
Status: DISCONTINUED | OUTPATIENT
Start: 2020-03-18 | End: 2020-03-18 | Stop reason: HOSPADM

## 2020-03-18 RX ADMIN — PROPOFOL 20 MG: 10 INJECTION, EMULSION INTRAVENOUS at 08:34

## 2020-03-18 RX ADMIN — LIDOCAINE HYDROCHLORIDE 100 MG: 20 INJECTION, SOLUTION EPIDURAL; INFILTRATION; INTRACAUDAL; PERINEURAL at 08:26

## 2020-03-18 RX ADMIN — PROPOFOL 30 MG: 10 INJECTION, EMULSION INTRAVENOUS at 08:33

## 2020-03-18 RX ADMIN — PROPOFOL 100 MCG/KG/MIN: 10 INJECTION, EMULSION INTRAVENOUS at 08:26

## 2020-03-18 RX ADMIN — PROPOFOL 20 MG: 10 INJECTION, EMULSION INTRAVENOUS at 08:32

## 2020-03-18 RX ADMIN — PROPOFOL 50 MG: 10 INJECTION, EMULSION INTRAVENOUS at 08:26

## 2020-03-18 RX ADMIN — SODIUM CHLORIDE, SODIUM LACTATE, POTASSIUM CHLORIDE, AND CALCIUM CHLORIDE: .6; .31; .03; .02 INJECTION, SOLUTION INTRAVENOUS at 07:22

## 2020-03-18 RX ADMIN — FENTANYL CITRATE 25 MCG: 50 INJECTION, SOLUTION INTRAMUSCULAR; INTRAVENOUS at 08:34

## 2020-03-18 NOTE — ANESTHESIA POSTPROCEDURE EVALUATION
Post-Op Assessment Note    CV Status:  Stable  Pain Score: 0    Pain management: adequate     Mental Status:  Awake and alert   Hydration Status:  Stable   PONV Controlled:  Controlled   Airway Patency:  Patent   Post Op Vitals Reviewed: Yes      Staff: CRNA           BP   101/59   Temp      Pulse  69   Resp   13   SpO2   98

## 2020-03-18 NOTE — INTERVAL H&P NOTE
H&P reviewed  After examining the patient I find no changes in the patients condition since the H&P had been written      Vitals:    03/18/20 0655   BP: 139/74   Pulse: 64   Resp: 22   Temp: 97 9 °F (36 6 °C)   SpO2: 100%

## 2020-03-18 NOTE — OP NOTE
OPERATIVE REPORT    PATIENT NAME: Kellie Mccarty    MEDICAL RECORD NO:  [id number]    PROCEDURE DATE:  20    :  1965    SURGEON:  JOSEF Haro , Ph D     [place] [facility]: [facility]    No qualified resident was available to assist for this surgery  A Physician Assistant was used to hold the endoscope during the release of the transverse carpal ligament  PREOPERATIVE DIAGNOSIS:    left carpal tunnel syndrome      POSTOPERATIVE DIAGNOSIS:   left carpal tunnel syndrome     PROCEDURE PERFORMED:   left endoscopic carpal tunnel release     ANESTHESIA:  conscious sedation and local    COMPLICATIONS:  None    TOURNIQUET TIME: 2 minutes at 250 mmHg on the left    DISPOSITION: Patient was sent to the PACU in stable condition  INDICATION:  The patient is an 48 y o  female with clinical and/or electrodiagnostic evidence of left carpal tunnel syndrome  The patient had failed non-operative management and opted for carpal tunnel release  After informing the patient of the risks and benefits of endoscopic carpal tunnel release, consent was obtained for surgical intervention  PROCEDURE: The patient was identified in the preoperative screening area  The consent form was signed and verified after identifying the correct operative site  The patient was taken to the operating room and underwent conscious sedation and local   The left upper extremity was then prepped and draped in normal sterile fashion with Chlorhexidine solution  The left arm was then elevated, exsanguinated with an Esmarch and the tourniquet placed about the brachium was insufflated to 250 mmHg  The Esmarch was removed  A transverse incision, approximately 1 cm in length, was made just proximal to the distal wrist crease and just ulnar to the median nerve  The subcutaneous tissue was dissected bluntly down to the level of the forearm fascia   A transverse split in the fascia was created by gently piercing the fascia with the tips of tenotomy scissors  The proximal portion of the fascia was released longitudinally into the forearm using tenotomy scissors  The distal fascia was left intact for insertion of dilators  The carpal canal was then dilated using sequentially increasing sized dilators  Once the canal was adequately dilated, the scope and canula tray were then introduced into the carpal canal  The transverse carpal ligament was covered with a thin layer of synovial tissue on its undersurface  The synovial layer was debrided to expose the transverse fibers and the distal edge of the ligament  Once clear visualization of the transverse carpal ligament was obtained, the knife blade was introduced into the canula tray and the ligament was then released from distal to proximal maintaining complete visualization throughout the procedure  Several passes of the knife blade were necessary in some areas to complete the release  Complete release was verified with the endoscopic camera in place clearly visualizing the  cut edges of the transverse carpal ligament with the overlying volar fatty tissue and palmaris brevis muscle fibers protruding through  The transverse carpal ligament was moderately thickended  The scope and cannula were then removed and the wound was irrigated with copious amounts of saline solution  The tourniquet was released at 2 minutes with good capillary refill and color in the digits  The small incision was then repaired using #4-0 nylon sutures placed in an interrupted horizontal mattress fashion  The patient tolerated the procedure well  The incision was dressed in a sterile soft bandage  There were no complications during the case  The patient was sent to the PACU in stable condition        I was present for the entire procedure     Patient Disposition:  PACU      SIGNATURE: Sharon Lobo MD/PhD  DATE: 03/18/20  TIME:  8:40 AM

## 2020-03-18 NOTE — ANESTHESIA PREPROCEDURE EVALUATION
Review of Systems/Medical History  Patient summary reviewed    No history of anesthetic complications     Cardiovascular  Exercise tolerance (METS): >4,  Hypertension controlled, CAD , CAD status: non-obstructive,    Pulmonary  Smoker cigarette smoker  , Tobacco cessation counseling given , COPD moderate- medication dependent , Asthma ,        GI/Hepatic    GERD well controlled,             Endo/Other  No diabetes ,      GYN       Hematology   Musculoskeletal    Arthritis     Neurology    Headaches, Fibromyalgia   Psychology   Depression , being treated for depression,              Physical Exam    Airway    Mallampati score: II  TM Distance: >3 FB  Neck ROM: full     Dental   No notable dental hx     Cardiovascular      Pulmonary      Other Findings        Anesthesia Plan  ASA Score- 3     Anesthesia Type- IV sedation with anesthesia with ASA Monitors  Additional Monitors:   Airway Plan:         Plan Factors- Patient instructed to abstain from smoking on day of procedure  Patient did not smoke on day of surgery  Induction- intravenous  Postoperative Plan-     Informed Consent- Anesthetic plan and risks discussed with patient  I personally reviewed this patient with the CRNA  Discussed and agreed on the Anesthesia Plan with the CRNA  John Navarro

## 2020-03-18 NOTE — DISCHARGE INSTRUCTIONS
Post Operative Instructions    You have had surgery on your arm today, please read and follow the information below:  · Elevate your hand above your elbow during the next 24-48 hours to help with swelling  · Place your hand and arm over your head with motion at your shoulder three times a day  · Do not apply any cream/ointment/oil to your incisions including antibiotics  · Do not soak your hands in standing water (dishwater, tubs, Jacuzzi's, pools, etc ) until given permission (typically 2-3 weeks after injury)    Call the office at [phone]  if you notice any:  · Increased numbness or tingling of your hand or fingers that is not relieved with elevation  · Increasing pain that is not controlled with medication  · Difficulty chewing, breathing, swallowing  · Chest pains or shortness of breath  · Fever over 101 4 degrees  Bandage: Your therapist will remove your bandage at your first therapy appointment  Motion: Move fingers into a fist 5 times a day, DO NOT move any splinted fingers  Weight bearing status: Avoid heavy lifting (>5 pounds) with the extremity that was operated on until follow up appointment  Normal activities of daily living are OK  Ice: Ice for 10 minutes every hour as needed for swelling x 24 hours  Sling: No sling necessary  Pain medication:     After surgery, we would like you to take Ibuprofen 600mg one tablet by mouth every 6 hours with food (at breakfast, lunch and dinner)  AND Tylenol 500 mg one tablet by mouth every 6 hours  (at breakfast, lunch and dinner) for 5-7 days after your surgery  Please take these medication EVERYDAY after surgery for 5-7 days, and not just as needed  You can take these medications at the same time  Taking these medications after surgery will limit your need for prescription pain medication        If the pain becomes severe, and the pain medication is not alleviating symptoms, The greatest source of pain after surgery is usually a tight dressing due to increased swelling after surgery  If the pain becomes severe after surgery, and the patient medication is NOT alleviating the symptoms, the patient should do the following: The patient should  loosen the top dressing (usually coban or an ace bandage) and loosen/cut the rolled gauze beneath  The 4x4 gauze that is directly covering the incision should remain in place  The splint (if the patient has one) should remain in place  The ace bandage/coban can then be replaced on top in a less constrictive manor  If this does not help relieve the pain/numbness in a few hours, the patient should call our office (number listed below)  and we can have them seen in the office for further evaluation  Follow-up Appointment: 7-10 days with Dr Vinayak Blackwell  Occupational Therapy: 3/20/2020  AFTER THE FIRST THERAPY APPOINTMENT, the patient may remove the splint/dressing for showering and clean the incision with soap and water  Keep incision dry after washing  Do not expose the incision to dirty water (oceans, pools, hot tubs, etc)     If you need help scheduling Therapy, you can call [phone]        Please call the office at [phone] if you have any questions or concerns regarding your post-operative care

## 2020-03-20 ENCOUNTER — APPOINTMENT (OUTPATIENT)
Dept: OCCUPATIONAL THERAPY | Facility: CLINIC | Age: 55
End: 2020-03-20
Payer: COMMERCIAL

## 2020-03-20 NOTE — PROGRESS NOTES
3/20/20:  Patient scheduled for post op on CTR on the left wrist, but she was a NS for treatment  Patient contacted by phone and she stated that she did not have transportation to therapy and that she would do the dressing change herself at home  No further treatment indicated at this time    DC to HEP per patient request

## 2020-03-31 DIAGNOSIS — M17.11 PRIMARY OSTEOARTHRITIS OF RIGHT KNEE: ICD-10-CM

## 2020-04-01 RX ORDER — ZINC GLUCONATE 50 MG
TABLET ORAL
Qty: 30 TABLET | Refills: 0 | Status: SHIPPED | OUTPATIENT
Start: 2020-04-01 | End: 2020-04-28 | Stop reason: SDUPTHER

## 2020-04-02 DIAGNOSIS — M17.11 PRIMARY OSTEOARTHRITIS OF RIGHT KNEE: ICD-10-CM

## 2020-04-02 RX ORDER — MULTIVITAMIN WITH FOLIC ACID 400 MCG
TABLET ORAL
Qty: 30 TABLET | Refills: 1 | Status: SHIPPED | OUTPATIENT
Start: 2020-04-02 | End: 2020-04-27

## 2020-04-08 ENCOUNTER — TELEPHONE (OUTPATIENT)
Dept: OBGYN CLINIC | Facility: HOSPITAL | Age: 55
End: 2020-04-08

## 2020-04-27 DIAGNOSIS — M17.11 PRIMARY OSTEOARTHRITIS OF RIGHT KNEE: ICD-10-CM

## 2020-04-27 RX ORDER — MULTIVITAMIN WITH FOLIC ACID 400 MCG
TABLET ORAL
Qty: 30 TABLET | Refills: 1 | Status: SHIPPED | OUTPATIENT
Start: 2020-04-27 | End: 2020-10-05 | Stop reason: SDUPTHER

## 2020-04-28 ENCOUNTER — TELEPHONE (OUTPATIENT)
Dept: OBGYN CLINIC | Facility: HOSPITAL | Age: 55
End: 2020-04-28

## 2020-04-28 DIAGNOSIS — M17.11 PRIMARY OSTEOARTHRITIS OF RIGHT KNEE: ICD-10-CM

## 2020-04-28 RX ORDER — LANOLIN ALCOHOL/MO/W.PET/CERES
400 CREAM (GRAM) TOPICAL DAILY
Qty: 30 TABLET | Refills: 0 | Status: SHIPPED | OUTPATIENT
Start: 2020-04-28 | End: 2020-05-26

## 2020-05-08 DIAGNOSIS — M19.90 OSTEOARTHRITIS, UNSPECIFIED OSTEOARTHRITIS TYPE, UNSPECIFIED SITE: ICD-10-CM

## 2020-05-08 RX ORDER — CELECOXIB 100 MG/1
CAPSULE ORAL
Qty: 60 CAPSULE | Refills: 3 | Status: SHIPPED | OUTPATIENT
Start: 2020-05-08 | End: 2020-05-31

## 2020-05-24 DIAGNOSIS — M17.11 PRIMARY OSTEOARTHRITIS OF RIGHT KNEE: ICD-10-CM

## 2020-05-26 RX ORDER — ZINC GLUCONATE 50 MG
TABLET ORAL
Qty: 30 TABLET | Refills: 0 | Status: SHIPPED | OUTPATIENT
Start: 2020-05-26 | End: 2020-06-25 | Stop reason: SDUPTHER

## 2020-05-27 DIAGNOSIS — Z11.59 SCREENING FOR VIRAL DISEASE: Primary | ICD-10-CM

## 2020-05-31 DIAGNOSIS — M19.90 OSTEOARTHRITIS, UNSPECIFIED OSTEOARTHRITIS TYPE, UNSPECIFIED SITE: ICD-10-CM

## 2020-05-31 RX ORDER — CELECOXIB 100 MG/1
CAPSULE ORAL
Qty: 60 CAPSULE | Refills: 3 | Status: SHIPPED | OUTPATIENT
Start: 2020-05-31

## 2020-06-03 ENCOUNTER — TELEPHONE (OUTPATIENT)
Dept: OBGYN CLINIC | Facility: CLINIC | Age: 55
End: 2020-06-03

## 2020-06-03 NOTE — TELEPHONE ENCOUNTER
I called the patient and asked for a call back  If she calls back, please find out when she would possibly be interested in rescheduling surgery  If they are concerned about COVID you may inform them of the measures we are taking in the hospital and the office to minimize risks such as  Masking and social distancing when possible  Unfortunately it is difficult to say when this epidemic will be over locally  If she does not feel comfortable with having surgery done,  Then we  Could always give her injections again if those were effective at all but again she would have to wait 3 months from last injection before having any surgery  If the  Injections are not effective and she is not comfortable with surgery right now, we could offer her referral to pain management in the meantime to try to control her symptoms  ----- Message from Uli Zuniga sent at 6/2/2020  3:34 PM EDT -----  Regarding: xuan TRUJILLO, this patient is seen in [place], she is CX SX at this time because her family wants her to because of covid, she said she w/c/b because she does know she needs sx

## 2020-06-04 NOTE — TELEPHONE ENCOUNTER
Please be advise I spoke to pt  Who stated she really has no idea when she would like to reschedule   Hopefully things will die down with covid-19 she says  Pt  Will call back to get a follow up appt   She needs to get a ride first

## 2020-06-23 DIAGNOSIS — M17.11 PRIMARY OSTEOARTHRITIS OF RIGHT KNEE: ICD-10-CM

## 2020-06-24 ENCOUNTER — TELEPHONE (OUTPATIENT)
Dept: OBGYN CLINIC | Facility: CLINIC | Age: 55
End: 2020-06-24

## 2020-06-25 DIAGNOSIS — M17.11 PRIMARY OSTEOARTHRITIS OF RIGHT KNEE: ICD-10-CM

## 2020-06-25 RX ORDER — LANOLIN ALCOHOL/MO/W.PET/CERES
400 CREAM (GRAM) TOPICAL DAILY
Qty: 30 TABLET | Refills: 0 | Status: SHIPPED | OUTPATIENT
Start: 2020-06-25 | End: 2022-08-01

## 2020-06-30 RX ORDER — ZINC GLUCONATE 50 MG
TABLET ORAL
Qty: 30 TABLET | Refills: 0 | Status: SHIPPED | OUTPATIENT
Start: 2020-06-30 | End: 2022-08-01

## 2020-10-04 DIAGNOSIS — M17.11 PRIMARY OSTEOARTHRITIS OF RIGHT KNEE: ICD-10-CM

## 2020-10-05 ENCOUNTER — TELEPHONE (OUTPATIENT)
Dept: OBGYN CLINIC | Facility: HOSPITAL | Age: 55
End: 2020-10-05

## 2020-10-05 DIAGNOSIS — M17.11 PRIMARY OSTEOARTHRITIS OF RIGHT KNEE: ICD-10-CM

## 2020-10-05 RX ORDER — ZINC GLUCONATE 50 MG
TABLET ORAL
Qty: 30 TABLET | Refills: 0 | OUTPATIENT
Start: 2020-10-05

## 2020-10-05 RX ORDER — MULTIVITAMIN WITH FOLIC ACID 400 MCG
1 TABLET ORAL DAILY
Qty: 30 TABLET | Refills: 1 | Status: ON HOLD | OUTPATIENT
Start: 2020-10-05 | End: 2021-09-07 | Stop reason: ALTCHOICE

## 2020-10-05 RX ORDER — ASCORBIC ACID 500 MG
500 TABLET ORAL DAILY
Qty: 30 TABLET | Refills: 0 | Status: ON HOLD | OUTPATIENT
Start: 2020-10-05 | End: 2021-09-07 | Stop reason: ALTCHOICE

## 2021-06-02 ENCOUNTER — TELEPHONE (OUTPATIENT)
Dept: NEUROLOGY | Facility: CLINIC | Age: 56
End: 2021-06-02

## 2021-06-02 NOTE — TELEPHONE ENCOUNTER
Best contact number for PATIENCE:[phone]    Emergency Contact name and number:None    Referring provider and telephone Kev Briceno    [facility]    Reason for Appointment/Dx:Migraines/Occiptal Neuralgia        Neurology Location patient would like to be seen:[facility]     Order received? No order being faxed                                                 Records Received? Yes in epic    Have you ever seen another Neurologist?       Yes Dr Reyna Pulido     ID/Policy #:    Secondary Insurance:    ID/Policy#: Workman's Comp/ Accident/ School  Information      Workman's Comp/Accident/School related? None    If yes name of Insurance company:    Claim #:    Date of Injury:    Type of Injury:     Name and Telephone Number:    Notes:Appointment schedule with patient new patient pack sent   Patient being referred by current neurologist [facility] Dr Jenelle Lacey   Advised patient due to insurance she will need a order from pcp order being faxed                    Appointment date: 08- 9:00am with Dr Elisa Glasgow

## 2021-06-04 ENCOUNTER — TRANSCRIBE ORDERS (OUTPATIENT)
Dept: NEUROLOGY | Facility: CLINIC | Age: 56
End: 2021-06-04

## 2021-06-04 DIAGNOSIS — G43.719 CHRONIC MIGRAINE WITHOUT AURA, INTRACTABLE, WITHOUT STATUS MIGRAINOSUS: Primary | ICD-10-CM

## 2021-08-25 ENCOUNTER — CONSULT (OUTPATIENT)
Dept: NEUROLOGY | Facility: CLINIC | Age: 56
End: 2021-08-25
Payer: COMMERCIAL

## 2021-08-25 VITALS
TEMPERATURE: 97.1 F | SYSTOLIC BLOOD PRESSURE: 112 MMHG | HEIGHT: 62 IN | RESPIRATION RATE: 16 BRPM | BODY MASS INDEX: 29.81 KG/M2 | DIASTOLIC BLOOD PRESSURE: 74 MMHG | HEART RATE: 76 BPM | WEIGHT: 162 LBS

## 2021-08-25 DIAGNOSIS — G43.719 CHRONIC MIGRAINE WITHOUT AURA, INTRACTABLE, WITHOUT STATUS MIGRAINOSUS: ICD-10-CM

## 2021-08-25 DIAGNOSIS — M54.81 BILATERAL OCCIPITAL NEURALGIA: ICD-10-CM

## 2021-08-25 PROCEDURE — 99244 OFF/OP CNSLTJ NEW/EST MOD 40: CPT | Performed by: PSYCHIATRY & NEUROLOGY

## 2021-08-25 RX ORDER — CEPHALEXIN 500 MG/1
500 CAPSULE ORAL 4 TIMES DAILY
COMMUNITY
Start: 2021-08-18 | End: 2021-08-25

## 2021-08-25 RX ORDER — TRAZODONE HYDROCHLORIDE 150 MG/1
150 TABLET ORAL
Status: ON HOLD | COMMUNITY
End: 2021-09-07 | Stop reason: ALTCHOICE

## 2021-08-25 RX ORDER — DIVALPROEX SODIUM 250 MG/1
250 TABLET, EXTENDED RELEASE ORAL DAILY
Qty: 30 TABLET | Refills: 5 | Status: SHIPPED | OUTPATIENT
Start: 2021-08-25 | End: 2022-03-24 | Stop reason: SDUPTHER

## 2021-08-25 RX ORDER — OXYCODONE HYDROCHLORIDE AND ACETAMINOPHEN 5; 325 MG/1; MG/1
TABLET ORAL
COMMUNITY
Start: 2021-08-19 | End: 2021-08-31 | Stop reason: SDUPTHER

## 2021-08-27 ENCOUNTER — TELEPHONE (OUTPATIENT)
Dept: UROLOGY | Facility: AMBULATORY SURGERY CENTER | Age: 56
End: 2021-08-27

## 2021-08-27 NOTE — TELEPHONE ENCOUNTER
Patient returned call to callcenter  Unable to make appointment offered for today  Patient scheduled for 8/31 at 1500 with Paulino Alma [facility]  Patient confirmed appointment

## 2021-08-27 NOTE — TELEPHONE ENCOUNTER
Patient seen at [facility] on 8/18 for L back/flank pain  CT shows Left-sided obstructive uropathy secondary to 7 to 8 mm calculus at the left   ureteropelvic junction  Attempt call to patient with no success  Brief message left to return call to office  Office number left  When patient returns call can offer appointment for today 8/27 with behzad at 1400

## 2021-08-27 NOTE — TELEPHONE ENCOUNTER
Reason for appointment/Complaint/Diagnosis : er follow up kidney stones seen in [facility]   Has not passed kidney stone has some pain  Insurance: [facility]    History of Cancer? no  If yes, what kind? n/a  Previous urologist?  no              Records requested/where? Epic    Outside testing/where? VN  Location Preference for office visit?  [place]

## 2021-08-31 ENCOUNTER — OFFICE VISIT (OUTPATIENT)
Dept: UROLOGY | Facility: CLINIC | Age: 56
End: 2021-08-31
Payer: COMMERCIAL

## 2021-08-31 VITALS
WEIGHT: 165.8 LBS | HEIGHT: 62 IN | SYSTOLIC BLOOD PRESSURE: 110 MMHG | DIASTOLIC BLOOD PRESSURE: 70 MMHG | BODY MASS INDEX: 30.51 KG/M2

## 2021-08-31 DIAGNOSIS — N20.0 KIDNEY STONE: Primary | ICD-10-CM

## 2021-08-31 LAB
SL AMB  POCT GLUCOSE, UA: NORMAL
SL AMB LEUKOCYTE ESTERASE,UA: NORMAL
SL AMB POCT BILIRUBIN,UA: NORMAL
SL AMB POCT BLOOD,UA: NORMAL
SL AMB POCT CLARITY,UA: CLEAR
SL AMB POCT COLOR,UA: YELLOW
SL AMB POCT KETONES,UA: NORMAL
SL AMB POCT NITRITE,UA: NORMAL
SL AMB POCT PH,UA: 5
SL AMB POCT SPECIFIC GRAVITY,UA: 1.01
SL AMB POCT URINE PROTEIN: NORMAL
SL AMB POCT UROBILINOGEN: NORMAL

## 2021-08-31 PROCEDURE — 99203 OFFICE O/P NEW LOW 30 MIN: CPT | Performed by: PHYSICIAN ASSISTANT

## 2021-08-31 PROCEDURE — 87086 URINE CULTURE/COLONY COUNT: CPT | Performed by: PHYSICIAN ASSISTANT

## 2021-08-31 PROCEDURE — 87186 SC STD MICRODIL/AGAR DIL: CPT | Performed by: PHYSICIAN ASSISTANT

## 2021-08-31 PROCEDURE — 87077 CULTURE AEROBIC IDENTIFY: CPT | Performed by: PHYSICIAN ASSISTANT

## 2021-08-31 PROCEDURE — 81002 URINALYSIS NONAUTO W/O SCOPE: CPT | Performed by: PHYSICIAN ASSISTANT

## 2021-08-31 RX ORDER — OXYCODONE HYDROCHLORIDE AND ACETAMINOPHEN 5; 325 MG/1; MG/1
1 TABLET ORAL EVERY 6 HOURS PRN
Qty: 10 TABLET | Refills: 0 | Status: SHIPPED | OUTPATIENT
Start: 2021-08-31 | End: 2021-09-07 | Stop reason: HOSPADM

## 2021-08-31 RX ORDER — TAMSULOSIN HYDROCHLORIDE 0.4 MG/1
0.4 CAPSULE ORAL
Qty: 14 CAPSULE | Refills: 0 | Status: ON HOLD | OUTPATIENT
Start: 2021-08-31 | End: 2021-09-07 | Stop reason: ALTCHOICE

## 2021-08-31 NOTE — PROGRESS NOTES
8/31/2021      Chief Complaint   Patient presents with    Nephrolithiasis     Assessment and Plan    49 y o  female new patient    1  Left 7-8 mm obstructing UPJ calculus   - Seen at [facility] ED on 8/18/21 with severe left flank pain  - CT performed on 8/18/21 reveals mild left hydronephrosis with prominence of the left renal   pelvis secondary to a 7 to 8 mm calculus at the left ureteropelvic junction   compatible with obstructive uropathy   - Given unlikelihood that patient will pass kidney stone given its larger size, would recommend proceeding with ureteroscopy  Given continued severe episodes of pain would recommend fast tracking this case  Discussed risks and procedure details as well as expected post-op recovery with patient  - Urine dip + for nitrites and trace blood  Negative for leukocytes  Urine sent out for culture  - RX for Flomax and Percocet for pain relief given  Recommend 2-3 L of water per day  - Return for surgery date  History of Present Illness  Gi Gray is a 49 y o  female here for new patient evaluation of kidney stone  She was seen in the ED on 8/18/21 with severe left flank pain  CT performed on 8/18/21 reveals mild left hydronephrosis with prominence of the left renal pelvis secondary to a 7 to 8 mm calculus at the left ureteropelvic junction compatible with obstructive uropathy  She was discharged with pain medication and antibiotics  She reports continued episodes of severe pain to left flank and left abdomen  Denies nausea, vomiting, fever, chills, hematuria, dysuria  Denies any prior kidney stones or prior  surgical manipulation  She was on Topamax for several years for migraines  This was recently switched to Depakote due to kidney stone formation  Medical history includes hx of bariatric surgery, chronic pain syndrome, fibromyalgia, occipital neuralgia, COPD and chronic migraines  She is a current smoker  Denies blood thinners  Denies allergies   Denies prior adverse reactions to anesthesia  Urine dip + for nitrites and trace blood  Negative for leukocytes       Review of Systems               Past Medical History  Past Medical History:   Diagnosis Date    Arthritis     Asthma     COPD [facility]chronic obstructive pulmonary disease) ([facility] [address] )     Coronary artery disease     Depression     Diabetes mellitus (HCC)     with weight loss no longer has high BS    Diverticulitis     Fibromyalgia     Fibromyalgia, primary     GERD (gastroesophageal reflux disease)     Hypertension     lost weight and no longer has    Migraine     Pancreatitis     Stomach problems        Past Social History  Past Surgical History:   Procedure Laterality Date    ABDOMINAL SURGERY      CHOLECYSTECTOMY      GALLBLADDER SURGERY      GASTRIC BYPASS      PANNICULECTOMY  2017    CA WRIST [facility] LIG Right 3/3/2020    Procedure: RELEASE CARPAL TUNNEL ENDOSCOPIC Right;  Surgeon: Yamila Mcginnis MD;  Location: [facility];  Service: Orthopedics    CA WRIST [facility] LIG Left 3/18/2020    Procedure: RELEASE LEFT CARPAL TUNNEL ENDOSCOPIC;  Surgeon: Yamila Mcginnis MD;  Location: [facility];  Service: Orthopedics    STOMACH SURGERY      tummy tuck    TUBAL LIGATION       Social History     Tobacco Use   Smoking Status Current Every Day Smoker    Packs/day: 0 50    Years: 25 00    Pack years: 12 50    Types: Cigarettes   Smokeless Tobacco Never Used       Past Family History  Family History   Problem Relation Age of Onset    Rheum arthritis Mother     Hypertension Mother     COPD Mother     Diabetes Father        Past Social history  Social History     Socioeconomic History    Marital status: /Civil Union     Spouse name: Not on file    Number of children: Not on file    Years of education: Not on file    Highest education level: Not on file   Occupational History    Not on file   Tobacco Use    Smoking status: Current Every Day Smoker Packs/day: 0 50     Years: 25 00     Pack years: 12 50     Types: Cigarettes    Smokeless tobacco: Never Used   Vaping Use    Vaping Use: Never used   Substance and Sexual Activity    Alcohol use: No    Drug use: No    Sexual activity: Not on file   Other Topics Concern    Not on file   Social History Narrative    Not on file     Social Determinants of Health     Financial Resource Strain:     Difficulty of Paying Living Expenses:    Food Insecurity:     Worried About Running Out of Food in the Last Year:     Ran Out of Food in the Last Year:    Transportation Needs:     Lack of Transportation (Medical):      Lack of Transportation (Non-Medical):    Physical Activity:     Days of Exercise per Week:     Minutes of Exercise per Session:    Stress:     Feeling of Stress :    Social Connections:     Frequency of Communication with Friends and Family:     Frequency of Social Gatherings with Friends and Family:     Attends Islam Services:     Active Member of Clubs or Organizations:     Attends Club or Organization Meetings:     Marital Status:    Intimate Partner Violence:     Fear of Current or Ex-Partner:     Emotionally Abused:     Physically Abused:     Sexually Abused:        Current Medications  Current Outpatient Medications   Medication Sig Dispense Refill    acetaminophen (TYLENOL) 650 mg CR tablet Take 1 tablet by mouth every 8 (eight) hours      albuterol (2 5 mg/3 mL) 0 083 % nebulizer solution Take 2 5 mg by nebulization 4 (four) times a day      albuterol (PROVENTIL HFA,VENTOLIN HFA) 90 mcg/act inhaler Inhale 2 puffs every 6 (six) hours as needed for wheezing      budesonide-formoterol (SYMBICORT) 160-4 5 mcg/act inhaler Inhale 2 puffs 2 (two) times a day      celecoxib (CeleBREX) 100 mg capsule take 1 capsule by mouth twice a day 60 capsule 3    dicyclomine (BENTYL) 20 mg tablet Take 20 mg by mouth daily      divalproex sodium (DEPAKOTE ER) 250 mg 24 hr tablet Take 1 tablet (250 mg total) by mouth daily 30 tablet 5    ferrous sulfate 324 (65 Fe) mg Take 1 tablet (324 mg total) by mouth 2 (two) times a day before meals (Patient taking differently: Take 324 mg by mouth daily before breakfast ) 60 tablet 0    folic acid (RA Folic Acid) 717 mcg tablet Take 1 tablet (400 mcg total) by mouth daily 30 tablet 0    gabapentin (NEURONTIN) 600 MG tablet Take 600 mg by mouth 2 (two) times a day       HYDROcodone-acetaminophen (NORCO) 5-325 mg per tablet Take 1 tablet by mouth every 6 (six) hours as needed for painMax Daily Amount: 4 tablets 20 tablet 0    magnesium oxide (MAG-OX) 400 mg Take 400 mg by mouth 2 (two) times a day      mometasone (ASMANEX TWISTHALER) 220 MCG/INH inhaler Inhale 220 mcg daily      ondansetron (ZOFRAN-ODT) 4 mg disintegrating tablet Take 4 mg by mouth as needed       pantoprazole (PROTONIX) 40 mg tablet 40 mg      traZODone (DESYREL) 150 mg tablet Take 150 mg by mouth daily at bedtime      ascorbic acid (RA Vitamin C) 500 MG tablet Take 1 tablet (500 mg total) by mouth daily (Patient not taking: Reported on 8/25/2021) 30 tablet 0    cyclobenzaprine (FLEXERIL) 10 mg tablet Take 1 tablet (10 mg total) by mouth daily at bedtime (Patient not taking: Reported on 8/25/2021) 30 tablet 3    DULoxetine (CYMBALTA) 30 mg delayed release capsule Take 30 mg by mouth 2 (two) times a day       enoxaparin (LOVENOX) 40 mg/0 4 mL Inject 0 4 mL (40 mg total) under the skin daily for 28 days Starting after surgery (Patient not taking: Reported on 8/25/2021) 28 Syringe 0    ergocalciferol (VITAMIN D2) 50,000 units Take 50,000 Units by mouth once a week (Patient not taking: Reported on 8/31/2021)      ipratropium (ATROVENT) 0 02 % nebulizer solution Inhale 0 25 mg every 6 (six) hours as needed      Multiple Vitamins-Minerals (Tab-A-Charles) TABS Take 1 tablet by mouth daily (Patient not taking: Reported on 8/31/2021) 30 tablet 1    NON FORMULARY  (Patient not taking: Reported on 8/25/2021)      oxyCODONE-acetaminophen (PERCOCET) 5-325 mg per tablet use for MODERATE TO SEVERE PAIN take 1 tablet by mouth every 6 ho     (REFER TO PRESCRIPTION NOTES)  (Patient not taking: Reported on 9/64/1173)      RA FOLIC ACID 948 MCG tablet take 1 tablet by mouth once daily (Patient not taking: Reported on 8/25/2021) 30 tablet 0    tiotropium (SPIRIVA RESPIMAT) 2 5 MCG/ACT AERS inhaler Inhale daily (Patient not taking: Reported on 8/25/2021)      tiotropium-olodaterol (STIOLTO RESPIMAT) 2 5-2 5 MCG/ACT inhaler Inhale 2 puffs daily (Patient not taking: Reported on 8/25/2021)       No current facility-administered medications for this visit         Allergies  No Known Allergies      The following portions of the patient's history were reviewed and updated as appropriate: allergies, current medications, past medical history, past social history, past surgical history and problem list       Vitals  Vitals:    08/31/21 1445   BP: 110/70   BP Location: Left arm   Patient Position: Sitting   Cuff Size: Adult   Weight: 75 2 kg (165 lb 12 8 oz)   Height: 5' 2" (1 575 m)           Physical Exam  Physical Exam      Results  Recent Results (from the past 1 hour(s))   POCT urine dip    Collection Time: 08/31/21  2:53 PM   Result Value Ref Range    LEUKOCYTE ESTERASE,UA neg     NITRITE,UA +     SL AMB POCT UROBILINOGEN neg     POCT URINE PROTEIN trace      PH,UA 5     BLOOD,UA trace     SPECIFIC GRAVITY,UA 1 015     KETONES,UA neg     BILIRUBIN,UA neg     GLUCOSE, UA neg      COLOR,UA yellow     CLARITY,UA clear    ]  No results found for: PSA  Lab Results   Component Value Date    CALCIUM 9 3 07/19/2019    K 3 9 07/19/2019    CO2 27 07/19/2019     07/19/2019    BUN 10 07/19/2019    CREATININE 0 61 07/19/2019     Lab Results   Component Value Date    WBC 9 03 07/19/2019    HGB 13 6 07/19/2019    HCT 40 7 07/19/2019    MCV 89 07/19/2019     07/19/2019           Orders  Orders Placed This Encounter   Procedures    POCT urine dip     Melissa Lee PA-C

## 2021-09-01 ENCOUNTER — TELEPHONE (OUTPATIENT)
Dept: UROLOGY | Facility: AMBULATORY SURGERY CENTER | Age: 56
End: 2021-09-01

## 2021-09-01 ENCOUNTER — PREP FOR PROCEDURE (OUTPATIENT)
Dept: UROLOGY | Facility: AMBULATORY SURGERY CENTER | Age: 56
End: 2021-09-01

## 2021-09-01 NOTE — TELEPHONE ENCOUNTER
Apollo Castro PA-C   9/1/2021  2:32 PM EDT Back to Top      Will await urine culture susceptibilities and treat with appropriate pre-op abx

## 2021-09-01 NOTE — TELEPHONE ENCOUNTER
Patient called in regarding her lab results and when a abx will be called into the pharmacy   Patient can be reached at [phone]

## 2021-09-01 NOTE — TELEPHONE ENCOUNTER
I called and scheduled surgery as fast track with Dr Ervin Campbell on 9/9 at [facility]  Patient had all of her pre op testing done and had ucx completed on 8/31  I reviewed pre op instructions and will be e-mailing patient her surgery packet this week  Patient had no other questions at this time and was appreciative of my call

## 2021-09-02 DIAGNOSIS — N39.0 URINARY TRACT INFECTION WITHOUT HEMATURIA, SITE UNSPECIFIED: Primary | ICD-10-CM

## 2021-09-02 LAB — BACTERIA UR CULT: ABNORMAL

## 2021-09-02 RX ORDER — CIPROFLOXACIN 500 MG/1
500 TABLET, FILM COATED ORAL EVERY 12 HOURS SCHEDULED
Qty: 10 TABLET | Refills: 0 | Status: SHIPPED | OUTPATIENT
Start: 2021-09-02 | End: 2021-09-07 | Stop reason: HOSPADM

## 2021-09-02 NOTE — TELEPHONE ENCOUNTER
Spoke with patient, she is aware anitbiotics have been sent  Heron Castrejon, [address] [place] [address] [facility]    Urine culture is positive  Prescription for antibiotics into her pharmacy which she should start 5 days prior to scheduled procedure on 09/09

## 2021-09-07 ENCOUNTER — ANESTHESIA (OUTPATIENT)
Dept: PERIOP | Facility: HOSPITAL | Age: 56
End: 2021-09-07
Payer: COMMERCIAL

## 2021-09-07 ENCOUNTER — HOSPITAL ENCOUNTER (OUTPATIENT)
Facility: HOSPITAL | Age: 56
Setting detail: OUTPATIENT SURGERY
Discharge: HOME | End: 2021-09-07
Attending: EMERGENCY MEDICINE | Admitting: INTERNAL MEDICINE
Payer: COMMERCIAL

## 2021-09-07 ENCOUNTER — APPOINTMENT (OUTPATIENT)
Dept: RADIOLOGY | Facility: HOSPITAL | Age: 56
End: 2021-09-07
Payer: COMMERCIAL

## 2021-09-07 ENCOUNTER — TELEPHONE (OUTPATIENT)
Dept: OTHER | Facility: OTHER | Age: 56
End: 2021-09-07

## 2021-09-07 ENCOUNTER — ANESTHESIA EVENT (OUTPATIENT)
Dept: PERIOP | Facility: HOSPITAL | Age: 56
End: 2021-09-07
Payer: COMMERCIAL

## 2021-09-07 ENCOUNTER — APPOINTMENT (EMERGENCY)
Dept: CT IMAGING | Facility: HOSPITAL | Age: 56
End: 2021-09-07
Payer: COMMERCIAL

## 2021-09-07 ENCOUNTER — TELEPHONE (OUTPATIENT)
Dept: UROLOGY | Facility: CLINIC | Age: 56
End: 2021-09-07

## 2021-09-07 VITALS
RESPIRATION RATE: 18 BRPM | TEMPERATURE: 98 F | DIASTOLIC BLOOD PRESSURE: 83 MMHG | HEART RATE: 75 BPM | OXYGEN SATURATION: 92 % | SYSTOLIC BLOOD PRESSURE: 151 MMHG

## 2021-09-07 DIAGNOSIS — N13.30 HYDRONEPHROSIS OF LEFT KIDNEY: ICD-10-CM

## 2021-09-07 DIAGNOSIS — R10.9 LEFT FLANK PAIN: ICD-10-CM

## 2021-09-07 DIAGNOSIS — N20.0 KIDNEY STONE ON LEFT SIDE: Primary | ICD-10-CM

## 2021-09-07 DIAGNOSIS — N20.1 LEFT URETERAL STONE: ICD-10-CM

## 2021-09-07 PROBLEM — F17.200 SMOKING: Status: ACTIVE | Noted: 2021-09-07

## 2021-09-07 PROBLEM — J45.909 ASTHMA: Status: ACTIVE | Noted: 2021-09-07

## 2021-09-07 LAB
ALBUMIN SERPL BCP-MCNC: 3.7 G/DL (ref 3.5–5)
ALP SERPL-CCNC: 201 U/L (ref 46–116)
ALT SERPL W P-5'-P-CCNC: 24 U/L (ref 12–78)
ANION GAP SERPL CALCULATED.3IONS-SCNC: 8 MMOL/L (ref 4–13)
AST SERPL W P-5'-P-CCNC: 17 U/L (ref 5–45)
BASOPHILS # BLD AUTO: 0.03 THOUSANDS/ΜL (ref 0–0.1)
BASOPHILS # BLD AUTO: 0.05 THOUSANDS/ΜL (ref 0–0.1)
BASOPHILS NFR BLD AUTO: 1 % (ref 0–1)
BASOPHILS NFR BLD AUTO: 1 % (ref 0–1)
BILIRUB SERPL-MCNC: 0.4 MG/DL (ref 0.2–1)
BILIRUB UR QL STRIP: NEGATIVE
BUN SERPL-MCNC: 16 MG/DL (ref 5–25)
CALCIUM SERPL-MCNC: 9 MG/DL (ref 8.3–10.1)
CHLORIDE SERPL-SCNC: 106 MMOL/L (ref 100–108)
CLARITY UR: NORMAL
CO2 SERPL-SCNC: 30 MMOL/L (ref 21–32)
COLOR UR: YELLOW
CREAT SERPL-MCNC: 0.85 MG/DL (ref 0.6–1.3)
EOSINOPHIL # BLD AUTO: 0.24 THOUSAND/ΜL (ref 0–0.61)
EOSINOPHIL # BLD AUTO: 0.49 THOUSAND/ΜL (ref 0–0.61)
EOSINOPHIL NFR BLD AUTO: 4 % (ref 0–6)
EOSINOPHIL NFR BLD AUTO: 5 % (ref 0–6)
ERYTHROCYTE [DISTWIDTH] IN BLOOD BY AUTOMATED COUNT: 13.7 % (ref 11.6–15.1)
ERYTHROCYTE [DISTWIDTH] IN BLOOD BY AUTOMATED COUNT: 13.8 % (ref 11.6–15.1)
GFR SERPL CREATININE-BSD FRML MDRD: 78 ML/MIN/1.73SQ M
GLUCOSE SERPL-MCNC: 83 MG/DL (ref 65–140)
GLUCOSE SERPL-MCNC: 94 MG/DL (ref 65–140)
GLUCOSE UR STRIP-MCNC: NEGATIVE MG/DL
HCG SERPL QL: NEGATIVE
HCT VFR BLD AUTO: 39.2 % (ref 34.8–46.1)
HCT VFR BLD AUTO: 43.4 % (ref 34.8–46.1)
HGB BLD-MCNC: 12.8 G/DL (ref 11.5–15.4)
HGB BLD-MCNC: 14 G/DL (ref 11.5–15.4)
HGB UR QL STRIP.AUTO: NEGATIVE
IMM GRANULOCYTES # BLD AUTO: 0.01 THOUSAND/UL (ref 0–0.2)
IMM GRANULOCYTES # BLD AUTO: 0.02 THOUSAND/UL (ref 0–0.2)
IMM GRANULOCYTES NFR BLD AUTO: 0 % (ref 0–2)
IMM GRANULOCYTES NFR BLD AUTO: 0 % (ref 0–2)
KETONES UR STRIP-MCNC: NEGATIVE MG/DL
LEUKOCYTE ESTERASE UR QL STRIP: NEGATIVE
LYMPHOCYTES # BLD AUTO: 1.17 THOUSANDS/ΜL (ref 0.6–4.47)
LYMPHOCYTES # BLD AUTO: 2.2 THOUSANDS/ΜL (ref 0.6–4.47)
LYMPHOCYTES NFR BLD AUTO: 19 % (ref 14–44)
LYMPHOCYTES NFR BLD AUTO: 23 % (ref 14–44)
MCH RBC QN AUTO: 28.5 PG (ref 26.8–34.3)
MCH RBC QN AUTO: 29.2 PG (ref 26.8–34.3)
MCHC RBC AUTO-ENTMCNC: 32.3 G/DL (ref 31.4–37.4)
MCHC RBC AUTO-ENTMCNC: 32.7 G/DL (ref 31.4–37.4)
MCV RBC AUTO: 88 FL (ref 82–98)
MCV RBC AUTO: 89 FL (ref 82–98)
MONOCYTES # BLD AUTO: 0.53 THOUSAND/ΜL (ref 0.17–1.22)
MONOCYTES # BLD AUTO: 0.66 THOUSAND/ΜL (ref 0.17–1.22)
MONOCYTES NFR BLD AUTO: 7 % (ref 4–12)
MONOCYTES NFR BLD AUTO: 8 % (ref 4–12)
NEUTROPHILS # BLD AUTO: 4.34 THOUSANDS/ΜL (ref 1.85–7.62)
NEUTROPHILS # BLD AUTO: 6.17 THOUSANDS/ΜL (ref 1.85–7.62)
NEUTS SEG NFR BLD AUTO: 64 % (ref 43–75)
NEUTS SEG NFR BLD AUTO: 68 % (ref 43–75)
NITRITE UR QL STRIP: NEGATIVE
NRBC BLD AUTO-RTO: 0 /100 WBCS
NRBC BLD AUTO-RTO: 0 /100 WBCS
PH UR STRIP.AUTO: 6 [PH]
PLATELET # BLD AUTO: 248 THOUSANDS/UL (ref 149–390)
PLATELET # BLD AUTO: 315 THOUSANDS/UL (ref 149–390)
PMV BLD AUTO: 9 FL (ref 8.9–12.7)
PMV BLD AUTO: 9.2 FL (ref 8.9–12.7)
POTASSIUM SERPL-SCNC: 4.2 MMOL/L (ref 3.5–5.3)
PROT SERPL-MCNC: 7.4 G/DL (ref 6.4–8.2)
PROT UR STRIP-MCNC: NEGATIVE MG/DL
RBC # BLD AUTO: 4.39 MILLION/UL (ref 3.81–5.12)
RBC # BLD AUTO: 4.92 MILLION/UL (ref 3.81–5.12)
SODIUM SERPL-SCNC: 144 MMOL/L (ref 136–145)
SP GR UR STRIP.AUTO: >=1.03 (ref 1–1.03)
UROBILINOGEN UR QL STRIP.AUTO: 0.2 E.U./DL
WBC # BLD AUTO: 6.32 THOUSAND/UL (ref 4.31–10.16)
WBC # BLD AUTO: 9.59 THOUSAND/UL (ref 4.31–10.16)

## 2021-09-07 PROCEDURE — C1769 GUIDE WIRE: HCPCS | Performed by: UROLOGY

## 2021-09-07 PROCEDURE — 99285 EMERGENCY DEPT VISIT HI MDM: CPT | Performed by: EMERGENCY MEDICINE

## 2021-09-07 PROCEDURE — 96361 HYDRATE IV INFUSION ADD-ON: CPT

## 2021-09-07 PROCEDURE — 84703 CHORIONIC GONADOTROPIN ASSAY: CPT | Performed by: EMERGENCY MEDICINE

## 2021-09-07 PROCEDURE — C1758 CATHETER, URETERAL: HCPCS | Performed by: UROLOGY

## 2021-09-07 PROCEDURE — 96376 TX/PRO/DX INJ SAME DRUG ADON: CPT

## 2021-09-07 PROCEDURE — 82948 REAGENT STRIP/BLOOD GLUCOSE: CPT

## 2021-09-07 PROCEDURE — NC001 PR NO CHARGE: Performed by: FAMILY MEDICINE

## 2021-09-07 PROCEDURE — 36415 COLL VENOUS BLD VENIPUNCTURE: CPT | Performed by: EMERGENCY MEDICINE

## 2021-09-07 PROCEDURE — 99219 PR INITIAL OBSERVATION CARE/DAY 50 MINUTES: CPT

## 2021-09-07 PROCEDURE — C1894 INTRO/SHEATH, NON-LASER: HCPCS | Performed by: UROLOGY

## 2021-09-07 PROCEDURE — 81003 URINALYSIS AUTO W/O SCOPE: CPT | Performed by: EMERGENCY MEDICINE

## 2021-09-07 PROCEDURE — 99244 OFF/OP CNSLTJ NEW/EST MOD 40: CPT | Performed by: UROLOGY

## 2021-09-07 PROCEDURE — 96375 TX/PRO/DX INJ NEW DRUG ADDON: CPT

## 2021-09-07 PROCEDURE — C2617 STENT, NON-COR, TEM W/O DEL: HCPCS | Performed by: UROLOGY

## 2021-09-07 PROCEDURE — 52356 CYSTO/URETERO W/LITHOTRIPSY: CPT | Performed by: UROLOGY

## 2021-09-07 PROCEDURE — 99217 PR OBSERVATION CARE DISCHARGE MANAGEMENT: CPT | Performed by: FAMILY MEDICINE

## 2021-09-07 PROCEDURE — 80053 COMPREHEN METABOLIC PANEL: CPT | Performed by: EMERGENCY MEDICINE

## 2021-09-07 PROCEDURE — 74018 RADEX ABDOMEN 1 VIEW: CPT

## 2021-09-07 PROCEDURE — 85025 COMPLETE CBC W/AUTO DIFF WBC: CPT

## 2021-09-07 PROCEDURE — 74176 CT ABD & PELVIS W/O CONTRAST: CPT

## 2021-09-07 PROCEDURE — 85025 COMPLETE CBC W/AUTO DIFF WBC: CPT | Performed by: EMERGENCY MEDICINE

## 2021-09-07 PROCEDURE — 96374 THER/PROPH/DIAG INJ IV PUSH: CPT

## 2021-09-07 PROCEDURE — 99285 EMERGENCY DEPT VISIT HI MDM: CPT

## 2021-09-07 DEVICE — INLAY OPTIMA URETERAL STENT W/O GUIDEWIRE
Type: IMPLANTABLE DEVICE | Site: URETER | Status: FUNCTIONAL
Brand: BARD® INLAY OPTIMA® URETERAL STENT

## 2021-09-07 RX ORDER — HYDROMORPHONE HCL/PF 1 MG/ML
0.5 SYRINGE (ML) INJECTION ONCE AS NEEDED
Status: DISCONTINUED | OUTPATIENT
Start: 2021-09-07 | End: 2021-09-07

## 2021-09-07 RX ORDER — PROPOFOL 10 MG/ML
INJECTION, EMULSION INTRAVENOUS AS NEEDED
Status: DISCONTINUED | OUTPATIENT
Start: 2021-09-07 | End: 2021-09-07

## 2021-09-07 RX ORDER — ONDANSETRON 2 MG/ML
INJECTION INTRAMUSCULAR; INTRAVENOUS AS NEEDED
Status: DISCONTINUED | OUTPATIENT
Start: 2021-09-07 | End: 2021-09-07

## 2021-09-07 RX ORDER — DULOXETIN HYDROCHLORIDE 30 MG/1
30 CAPSULE, DELAYED RELEASE ORAL 2 TIMES DAILY
Status: DISCONTINUED | OUTPATIENT
Start: 2021-09-07 | End: 2021-09-07 | Stop reason: HOSPADM

## 2021-09-07 RX ORDER — FERROUS SULFATE 325(65) MG
325 TABLET ORAL
Status: DISCONTINUED | OUTPATIENT
Start: 2021-09-07 | End: 2021-09-07 | Stop reason: HOSPADM

## 2021-09-07 RX ORDER — OXYCODONE HYDROCHLORIDE 10 MG/1
10 TABLET ORAL EVERY 4 HOURS PRN
Status: DISCONTINUED | OUTPATIENT
Start: 2021-09-07 | End: 2021-09-07 | Stop reason: HOSPADM

## 2021-09-07 RX ORDER — LANOLIN ALCOHOL/MO/W.PET/CERES
400 CREAM (GRAM) TOPICAL DAILY
Status: DISCONTINUED | OUTPATIENT
Start: 2021-09-07 | End: 2021-09-07 | Stop reason: HOSPADM

## 2021-09-07 RX ORDER — MAGNESIUM HYDROXIDE 1200 MG/15ML
LIQUID ORAL AS NEEDED
Status: DISCONTINUED | OUTPATIENT
Start: 2021-09-07 | End: 2021-09-07 | Stop reason: HOSPADM

## 2021-09-07 RX ORDER — CEPHALEXIN 500 MG/1
500 CAPSULE ORAL EVERY 6 HOURS SCHEDULED
Qty: 12 CAPSULE | Refills: 0 | Status: SHIPPED | OUTPATIENT
Start: 2021-09-07 | End: 2021-09-10

## 2021-09-07 RX ORDER — HYDROMORPHONE HCL/PF 1 MG/ML
0.2 SYRINGE (ML) INJECTION
Status: CANCELLED | OUTPATIENT
Start: 2021-09-07

## 2021-09-07 RX ORDER — HYDROMORPHONE HCL/PF 1 MG/ML
0.5 SYRINGE (ML) INJECTION EVERY 4 HOURS PRN
Status: DISCONTINUED | OUTPATIENT
Start: 2021-09-07 | End: 2021-09-07 | Stop reason: HOSPADM

## 2021-09-07 RX ORDER — FENTANYL CITRATE/PF 50 MCG/ML
50 SYRINGE (ML) INJECTION
Status: CANCELLED | OUTPATIENT
Start: 2021-09-07

## 2021-09-07 RX ORDER — DICYCLOMINE HCL 20 MG
20 TABLET ORAL DAILY
Status: DISCONTINUED | OUTPATIENT
Start: 2021-09-07 | End: 2021-09-07 | Stop reason: HOSPADM

## 2021-09-07 RX ORDER — TAMSULOSIN HYDROCHLORIDE 0.4 MG/1
0.4 CAPSULE ORAL
Status: DISCONTINUED | OUTPATIENT
Start: 2021-09-07 | End: 2021-09-07 | Stop reason: HOSPADM

## 2021-09-07 RX ORDER — OXYBUTYNIN CHLORIDE 5 MG/1
5 TABLET ORAL 3 TIMES DAILY PRN
Qty: 20 TABLET | Refills: 0 | Status: SHIPPED | OUTPATIENT
Start: 2021-09-07 | End: 2022-08-01

## 2021-09-07 RX ORDER — ONDANSETRON 2 MG/ML
4 INJECTION INTRAMUSCULAR; INTRAVENOUS EVERY 6 HOURS PRN
Status: DISCONTINUED | OUTPATIENT
Start: 2021-09-07 | End: 2021-09-07

## 2021-09-07 RX ORDER — HYDROMORPHONE HCL/PF 1 MG/ML
0.5 SYRINGE (ML) INJECTION ONCE
Status: COMPLETED | OUTPATIENT
Start: 2021-09-07 | End: 2021-09-07

## 2021-09-07 RX ORDER — FENTANYL CITRATE 50 UG/ML
INJECTION, SOLUTION INTRAMUSCULAR; INTRAVENOUS AS NEEDED
Status: DISCONTINUED | OUTPATIENT
Start: 2021-09-07 | End: 2021-09-07

## 2021-09-07 RX ORDER — SODIUM CHLORIDE, SODIUM LACTATE, POTASSIUM CHLORIDE, CALCIUM CHLORIDE 600; 310; 30; 20 MG/100ML; MG/100ML; MG/100ML; MG/100ML
INJECTION, SOLUTION INTRAVENOUS CONTINUOUS PRN
Status: DISCONTINUED | OUTPATIENT
Start: 2021-09-07 | End: 2021-09-07

## 2021-09-07 RX ORDER — DEXAMETHASONE SODIUM PHOSPHATE 10 MG/ML
INJECTION, SOLUTION INTRAMUSCULAR; INTRAVENOUS AS NEEDED
Status: DISCONTINUED | OUTPATIENT
Start: 2021-09-07 | End: 2021-09-07

## 2021-09-07 RX ORDER — ONDANSETRON 2 MG/ML
4 INJECTION INTRAMUSCULAR; INTRAVENOUS ONCE AS NEEDED
Status: CANCELLED | OUTPATIENT
Start: 2021-09-07

## 2021-09-07 RX ORDER — PHENAZOPYRIDINE HYDROCHLORIDE 200 MG/1
200 TABLET, FILM COATED ORAL 3 TIMES DAILY PRN
Qty: 30 TABLET | Refills: 0 | Status: SHIPPED | OUTPATIENT
Start: 2021-09-07 | End: 2022-08-01

## 2021-09-07 RX ORDER — SUCCINYLCHOLINE/SOD CL,ISO/PF 100 MG/5ML
SYRINGE (ML) INTRAVENOUS AS NEEDED
Status: DISCONTINUED | OUTPATIENT
Start: 2021-09-07 | End: 2021-09-07

## 2021-09-07 RX ORDER — KETOROLAC TROMETHAMINE 30 MG/ML
15 INJECTION, SOLUTION INTRAMUSCULAR; INTRAVENOUS EVERY 6 HOURS PRN
Status: DISCONTINUED | OUTPATIENT
Start: 2021-09-07 | End: 2021-09-07 | Stop reason: HOSPADM

## 2021-09-07 RX ORDER — PANTOPRAZOLE SODIUM 40 MG/1
40 TABLET, DELAYED RELEASE ORAL
Status: DISCONTINUED | OUTPATIENT
Start: 2021-09-07 | End: 2021-09-07 | Stop reason: HOSPADM

## 2021-09-07 RX ORDER — MIDAZOLAM HYDROCHLORIDE 2 MG/2ML
INJECTION, SOLUTION INTRAMUSCULAR; INTRAVENOUS AS NEEDED
Status: DISCONTINUED | OUTPATIENT
Start: 2021-09-07 | End: 2021-09-07

## 2021-09-07 RX ORDER — TRAZODONE HYDROCHLORIDE 150 MG/1
150 TABLET ORAL
Qty: 30 TABLET | Refills: 0 | Status: SHIPPED | OUTPATIENT
Start: 2021-09-07

## 2021-09-07 RX ORDER — ALBUTEROL SULFATE 2.5 MG/3ML
2.5 SOLUTION RESPIRATORY (INHALATION) ONCE AS NEEDED
Status: CANCELLED | OUTPATIENT
Start: 2021-09-07

## 2021-09-07 RX ORDER — CEFAZOLIN SODIUM 1 G/50ML
1000 SOLUTION INTRAVENOUS ONCE
Status: COMPLETED | OUTPATIENT
Start: 2021-09-07 | End: 2021-09-07

## 2021-09-07 RX ORDER — ACETAMINOPHEN 325 MG/1
650 TABLET ORAL EVERY 6 HOURS PRN
Status: DISCONTINUED | OUTPATIENT
Start: 2021-09-07 | End: 2021-09-07 | Stop reason: HOSPADM

## 2021-09-07 RX ORDER — SODIUM CHLORIDE, SODIUM LACTATE, POTASSIUM CHLORIDE, CALCIUM CHLORIDE 600; 310; 30; 20 MG/100ML; MG/100ML; MG/100ML; MG/100ML
100 INJECTION, SOLUTION INTRAVENOUS CONTINUOUS
Status: CANCELLED | OUTPATIENT
Start: 2021-09-07

## 2021-09-07 RX ORDER — DIVALPROEX SODIUM 250 MG/1
250 TABLET, EXTENDED RELEASE ORAL DAILY
Status: DISCONTINUED | OUTPATIENT
Start: 2021-09-07 | End: 2021-09-07 | Stop reason: HOSPADM

## 2021-09-07 RX ORDER — KETOROLAC TROMETHAMINE 30 MG/ML
15 INJECTION, SOLUTION INTRAMUSCULAR; INTRAVENOUS ONCE
Status: COMPLETED | OUTPATIENT
Start: 2021-09-07 | End: 2021-09-07

## 2021-09-07 RX ORDER — LIDOCAINE HYDROCHLORIDE 10 MG/ML
INJECTION, SOLUTION EPIDURAL; INFILTRATION; INTRACAUDAL; PERINEURAL AS NEEDED
Status: DISCONTINUED | OUTPATIENT
Start: 2021-09-07 | End: 2021-09-07

## 2021-09-07 RX ORDER — GABAPENTIN 300 MG/1
600 CAPSULE ORAL 2 TIMES DAILY
Status: DISCONTINUED | OUTPATIENT
Start: 2021-09-07 | End: 2021-09-07 | Stop reason: HOSPADM

## 2021-09-07 RX ORDER — HYDROCODONE BITARTRATE AND ACETAMINOPHEN 5; 325 MG/1; MG/1
1 TABLET ORAL EVERY 4 HOURS PRN
Qty: 20 TABLET | Refills: 0 | Status: SHIPPED | OUTPATIENT
Start: 2021-09-07 | End: 2021-09-10 | Stop reason: SDUPTHER

## 2021-09-07 RX ORDER — ONDANSETRON 2 MG/ML
4 INJECTION INTRAMUSCULAR; INTRAVENOUS EVERY 4 HOURS PRN
Status: DISCONTINUED | OUTPATIENT
Start: 2021-09-07 | End: 2021-09-07 | Stop reason: HOSPADM

## 2021-09-07 RX ADMIN — MIDAZOLAM HYDROCHLORIDE 2 MG: 1 INJECTION, SOLUTION INTRAMUSCULAR; INTRAVENOUS at 14:11

## 2021-09-07 RX ADMIN — PROPOFOL 150 MG: 10 INJECTION, EMULSION INTRAVENOUS at 14:17

## 2021-09-07 RX ADMIN — ONDANSETRON 4 MG: 2 INJECTION INTRAMUSCULAR; INTRAVENOUS at 12:48

## 2021-09-07 RX ADMIN — PANTOPRAZOLE SODIUM 40 MG: 40 TABLET, DELAYED RELEASE ORAL at 08:37

## 2021-09-07 RX ADMIN — KETOROLAC TROMETHAMINE 15 MG: 30 INJECTION, SOLUTION INTRAMUSCULAR; INTRAVENOUS at 03:05

## 2021-09-07 RX ADMIN — HYDROMORPHONE HYDROCHLORIDE 0.5 MG: 1 INJECTION, SOLUTION INTRAMUSCULAR; INTRAVENOUS; SUBCUTANEOUS at 03:05

## 2021-09-07 RX ADMIN — SODIUM CHLORIDE, SODIUM LACTATE, POTASSIUM CHLORIDE, AND CALCIUM CHLORIDE: .6; .31; .03; .02 INJECTION, SOLUTION INTRAVENOUS at 13:53

## 2021-09-07 RX ADMIN — MAGNESIUM OXIDE TAB 400 MG (241.3 MG ELEMENTAL MG) 400 MG: 400 (241.3 MG) TAB at 08:38

## 2021-09-07 RX ADMIN — Medication 80 MG: at 14:17

## 2021-09-07 RX ADMIN — CEFAZOLIN SODIUM 1000 MG: 1 SOLUTION INTRAVENOUS at 14:00

## 2021-09-07 RX ADMIN — SODIUM CHLORIDE 1000 ML: 0.9 INJECTION, SOLUTION INTRAVENOUS at 03:06

## 2021-09-07 RX ADMIN — ONDANSETRON 4 MG: 2 INJECTION INTRAMUSCULAR; INTRAVENOUS at 14:48

## 2021-09-07 RX ADMIN — ONDANSETRON 4 MG: 2 INJECTION INTRAMUSCULAR; INTRAVENOUS at 08:37

## 2021-09-07 RX ADMIN — DICYCLOMINE HYDROCHLORIDE 20 MG: 20 TABLET ORAL at 08:37

## 2021-09-07 RX ADMIN — FENTANYL CITRATE 50 MCG: 50 INJECTION, SOLUTION INTRAMUSCULAR; INTRAVENOUS at 14:46

## 2021-09-07 RX ADMIN — GABAPENTIN 600 MG: 300 CAPSULE ORAL at 08:47

## 2021-09-07 RX ADMIN — LIDOCAINE HYDROCHLORIDE 50 MG: 10 INJECTION, SOLUTION EPIDURAL; INFILTRATION; INTRACAUDAL; PERINEURAL at 14:17

## 2021-09-07 RX ADMIN — HYDROMORPHONE HYDROCHLORIDE 0.5 MG: 1 INJECTION, SOLUTION INTRAMUSCULAR; INTRAVENOUS; SUBCUTANEOUS at 05:22

## 2021-09-07 RX ADMIN — OXYCODONE HYDROCHLORIDE 10 MG: 10 TABLET ORAL at 08:37

## 2021-09-07 RX ADMIN — HYDROMORPHONE HYDROCHLORIDE 0.5 MG: 1 INJECTION, SOLUTION INTRAMUSCULAR; INTRAVENOUS; SUBCUTANEOUS at 03:37

## 2021-09-07 RX ADMIN — HYDROMORPHONE HYDROCHLORIDE 0.5 MG: 1 INJECTION, SOLUTION INTRAMUSCULAR; INTRAVENOUS; SUBCUTANEOUS at 10:44

## 2021-09-07 RX ADMIN — FERROUS SULFATE TAB 325 MG (65 MG ELEMENTAL FE) 325 MG: 325 (65 FE) TAB at 08:37

## 2021-09-07 RX ADMIN — DULOXETINE HYDROCHLORIDE 30 MG: 30 CAPSULE, DELAYED RELEASE ORAL at 08:38

## 2021-09-07 RX ADMIN — DEXAMETHASONE SODIUM PHOSPHATE 4 MG: 10 INJECTION, SOLUTION INTRAMUSCULAR; INTRAVENOUS at 14:17

## 2021-09-07 RX ADMIN — FOLIC ACID TAB 400 MCG 400 MCG: 400 TAB at 08:38

## 2021-09-07 RX ADMIN — FENTANYL CITRATE 50 MCG: 50 INJECTION, SOLUTION INTRAMUSCULAR; INTRAVENOUS at 14:17

## 2021-09-07 NOTE — DISCHARGE SUMMARY
[facility]  Discharge- Buster Calvin 1965, 49 y o  female MRN: [id number]  Unit/Bed#: OR POOL Encounter: [id number]  Primary Care Provider: Angela Suh   Date and time admitted to hospital: 9/7/2021  2:44 AM    * Kidney stone on left side  Assessment & Plan  Patient with known left kidney stone, has outpatient cysto and lithos this Thursday  Presenting to the ED in uncontrolled pain    · UA negative for UTI; afebrile  · Pregnancy POCT negative  · No leukocytosis  · Urology consult appreciated  · Patient underwentcystoscopy left ureteroscopy laser lithotripsy left ureteral stent placement  · She will be sent home Norco, Pyridium, Keflex, and oxybutynin   · She will follow-up with urology clinic next week for stent removal    Diabetes mellitus [facility])  Assessment & Plan  Lab Results   Component Value Date    HGBA1C 5 1 04/04/2018       Recent Labs     09/07/21  1508   POCGLU 94     · Latest blood glucose 83  · Monitor; consider SSI if uncontrolled      Hypertension  Assessment & Plan  · Latest /70  · Likely elevated in setting of pain  · Pain control  · Continue home HTN medications  · Monitor per unit protocol    COPD (chronic obstructive pulmonary disease) (HCC)  Assessment & Plan  · 98% O2 RA  · Not SOB  · Continue home prn inhalers if needed      Discharging Physician / Practitioner: Rex Flores MD  PCP: Angela Suh  Admission Date:   Admission Orders (From admission, onward)     Ordered        09/07/21 0542  Place in Observation  Once                   Discharge Date: 09/07/21    Medical Problems     Resolved Problems  Date Reviewed: 9/7/2021    None                Consultations During Hospital Stay:  · Urology    Procedures Performed:   · As above    Significant Findings / Test Results:   · As above    Incidental Findings:   · None     Test Results Pending at Discharge (will require follow up):    · None     Outpatient Tests Requested:  · None    Complications: None    Reason for Admission:  Left flank pain    Hospital Course:     Tricia Mojica is a 49 y o  female patient who originally presented to the hospital on 9/7/2021 due to left flank pain  Patient was found 6 6 mm obstructing stone the proximal left ureter with associated mild left hydronephrosis and obstructive uropathy  No ROCKY  underwent successful cystoscopy left ureteroscopy laser lithotripsy left ureteral stent placement  she will follow-up with urology next week for stent removal   Prescriptions were sent for Norco, Pyridium, Keflex, and oxybutynin to her pharmacy  Please see above list of diagnoses and related plan for additional information  Condition at Discharge: good     Discharge Day Visit / Exam:     * Please refer to separate progress note for these details *    Discussion with Family:  With patient    Discharge instructions/Information to patient and family:   See after visit summary for information provided to patient and family  Provisions for Follow-Up Care:  See after visit summary for information related to follow-up care and any pertinent home health orders  Disposition:   Home  For Discharges to [facility] SN[facility]:   · Not Applicable to this Patient - Not Applicable to this Patient    Planned Readmission:  No     Discharge Statement:  I spent 35 minutes discharging the patient  This time was spent on the day of discharge  I had direct contact with the patient on the day of discharge  Greater than 50% of the total time was spent examining patient, answering all patient questions, arranging and discussing plan of care with patient as well as directly providing post-discharge instructions  Additional time then spent on discharge activities  Discharge Medications:  See after visit summary for reconciled discharge medications provided to patient and family        ** Please Note: This note has been constructed using a voice recognition system **

## 2021-09-07 NOTE — DISCHARGE INSTR - AVS FIRST PAGE
Dear Andra Richardson,     It was our pleasure to care for you here at [facility], bed last [place]  It is our hope that we were always able to exceed the expected standards for your care during your stay  You were hospitalized due to left kidney stone  You were cared for on the medical floor by Mariella Lazo MD with the [address] [place] [address] Internal [facility] who covers for your primary care physician (PCP), Shireen Vernell, while you were hospitalized  If you have any questions or concerns related to this hospitalization, you may contact us at [phone]  For follow up as well as any medication refills, we recommend that you follow up with your primary care physician  A registered nurse will reach out to you by phone within a few days after your discharge to answer any additional questions that you may have after going home  However, at this time we provide for you here, the most important instructions / recommendations at discharge:     · Notable Medication Adjustments -   Pyridium, Keflex, and oxybutynin  · Testing Required after Discharge -   · None  · Important follow up information -   · Follow-up with urology next week for stent  · Other Instructions -   · Follow-up with PCP  · Please review this entire after visit summary as additional general instructions including medication list, appointments, activity, diet, any pertinent wound care, and other additional recommendations from your care team that may be provided for you        Sincerely,     Mariella Lazo MD

## 2021-09-07 NOTE — ED PROVIDER NOTES
History  Chief Complaint   Patient presents with    Flank Pain     Pt reports left flank pain, reports onset of pain in the beginning of August, dx with kidney stone 8/18, supposed to have surgery on Thursday  49year-old female presents with a recurrent episode of left sided flank pain with radiation to the left lower quadrant associated with hematuria  Patient describes severe sharp pain like prior episodes of renal colic that woke her from sleep and continues in the ER  Patient states nothing clearly aggravates the pain and nothing has alleviated it  Patient saw urology and has elective lithotripsy scheduled for later this week after initial diagnosis approximately 3 weeks ago at an OSH  Patient notes intermittent episodes of pain since then  Patient denies any dysuria or frequency though she states her urinalysis was positive and she was started on ciprofloxacin by urology which she has been taking as directed  ROS: Patient associates nausea without vomiting; denies fever/chills, diarrhea, dyspnea, anorexia, constipation, diaphoresis, chest pain, groin pain, dysuria, hematuria, melena, or back/neck pain  All other systems reviewed and negative  Medical Decision Making   Patient presents with flank pain with a broad differential including intra-abdominal pathologies such as nephrolithiasis and pyelonephritis  As patient notes associated urinary symptoms, most likely associated with renal pathology  Patient denies poorly controlled diabetes, chronic kidney disease, history of congenital urinary abnormality or renal transplant, or history of immunocompromised state  Based on the patient's presentation and symptoms, laboratory evaluation to include urinalysis to evaluate for hematuria and infectious etiologies, BMP to evaluate renal function and electrolytes, and CBC to evaluate for leukocytosis will be completed       Non-contrast CT imaging will be completed to evaluate for nephrolithiasis or underlying anatomic abnormality to detect processes that may delay response to therapy or complications such as renal or perinephric assesses  Urine pregnancy will be sent to evaluate for alternative sources and to guide therapeutic choices  Reassessment:  Patient with persistent pain despite treatment with multiple medications  Patient's CT with findings consistent with renal colic as the source of patient's pain  Discussed with Urology, will admit here for intervention as this was scheduled previously  History provided by:  Patient  Flank Pain  Pain location:  L flank  Pain quality: sharp    Pain radiates to:  LLQ  Pain severity:  Severe  Timing:  Constant  Progression:  Waxing and waning  Context: awakening from sleep    Relieved by:  Nothing  Worsened by:  Nothing  Associated symptoms: hematuria and nausea    Associated symptoms: no chest pain, no chills, no constipation, no cough, no diarrhea, no dysuria, no fatigue, no fever, no hematemesis, no hematochezia, no melena, no shortness of breath, no sore throat, no vaginal bleeding, no vaginal discharge and no vomiting        Prior to Admission Medications   Prescriptions Last Dose Informant Patient Reported? Taking?    DULoxetine (CYMBALTA) 30 mg delayed release capsule 9/7/2021 at Unknown time Self Yes Yes   Sig: Take 30 mg by mouth 2 (two) times a day    HYDROcodone-acetaminophen (NORCO) 5-325 mg per tablet Unknown at Unknown time Self No No   Sig: Take 1 tablet by mouth every 6 (six) hours as needed for painMax Daily Amount: 4 tablets   Patient not taking: Reported on 9/7/2021   NON FORMULARY Not Taking at Unknown time Self Yes No   Patient not taking: Reported on 8/35/5869   RA FOLIC ACID 471 MCG tablet 9/7/2021 at Unknown time Self No Yes   Sig: take 1 tablet by mouth once daily   acetaminophen (TYLENOL) 650 mg CR tablet More than a month at Unknown time Self Yes No   Sig: Take 1 tablet by mouth every 8 (eight) hours   albuterol (2 5 mg/3 mL) 0 083 % nebulizer solution More than a month at Unknown time Self Yes No   Sig: Take 2 5 mg by nebulization 4 (four) times a day   albuterol (PROVENTIL HFA,VENTOLIN HFA) 90 mcg/act inhaler Past Week at Unknown time Self Yes Yes   Sig: Inhale 2 puffs every 6 (six) hours as needed for wheezing   budesonide-formoterol (SYMBICORT) 160-4 5 mcg/act inhaler Past Month at Unknown time Self Yes Yes   Sig: Inhale 2 puffs 2 (two) times a day   celecoxib (CeleBREX) 100 mg capsule Past Week at Unknown time Self No Yes   Sig: take 1 capsule by mouth twice a day   ciprofloxacin (CIPRO) 500 mg tablet 9/6/2021 at Unknown time  No Yes   Sig: Take 1 tablet (500 mg total) by mouth every 12 (twelve) hours for 5 days   dicyclomine (BENTYL) 20 mg tablet 9/7/2021 at Unknown time Self Yes Yes   Sig: Take 20 mg by mouth daily   divalproex sodium (DEPAKOTE ER) 250 mg 24 hr tablet 9/6/2021 at Unknown time  No Yes   Sig: Take 1 tablet (250 mg total) by mouth daily   Patient taking differently: Take 250 mg by mouth daily at bedtime as needed    ferrous sulfate 324 (65 Fe) mg 9/7/2021 at Unknown time Self No Yes   Sig: Take 1 tablet (324 mg total) by mouth 2 (two) times a day before meals   Patient taking differently: Take 324 mg by mouth daily before breakfast    folic acid (RA Folic Acid) 904 mcg tablet 9/7/2021 at Unknown time Self No Yes   Sig: Take 1 tablet (400 mcg total) by mouth daily   gabapentin (NEURONTIN) 600 MG tablet 9/7/2021 at Unknown time Self Yes Yes   Sig: Take 600 mg by mouth 2 (two) times a day    ipratropium (ATROVENT) 0 02 % nebulizer solution More than a month at Unknown time Self Yes No   Sig: Inhale 0 25 mg every 6 (six) hours as needed   magnesium oxide (MAG-OX) 400 mg 9/7/2021 at Unknown time Self Yes Yes   Sig: Take 400 mg by mouth 2 (two) times a day   ondansetron (ZOFRAN-ODT) 4 mg disintegrating tablet 9/7/2021 at Unknown time Self Yes Yes   Sig: Take 4 mg by mouth as needed    oxyCODONE-acetaminophen (PERCOCET) 5-325 mg per tablet 2021 at Unknown time  No Yes   Sig: Take 1 tablet by mouth every 6 (six) hours as needed for severe painMax Daily Amount: 4 tablets   pantoprazole (PROTONIX) 40 mg tablet 2021 at Unknown time Self Yes Yes   Si mg   traZODone (DESYREL) 150 mg tablet Not Taking at Unknown time Self Yes No   Sig: Take 150 mg by mouth daily at bedtime   Patient not taking: Reported on 2021      Facility-Administered Medications: None       Past Medical History:   Diagnosis Date    Arthritis     Asthma     COPD (chronic obstructive pulmonary disease) ([facility] )     Coronary artery disease     Depression     Diabetes mellitus ([facility] )     with weight loss no longer has high BS    Diverticulitis     Fibromyalgia     Fibromyalgia, primary     GERD (gastroesophageal reflux disease)     Hypertension     lost weight and no longer has    Migraine     Pancreatitis     Stomach problems        Past Surgical History:   Procedure Laterality Date    ABDOMINAL SURGERY      CHOLECYSTECTOMY      GALLBLADDER SURGERY      GASTRIC BYPASS      PANNICULECTOMY      [place] CYSTO/URETERO W/LITHOTRIPSY &INDWELL STENT INSRT Left 2021    Procedure: CYSTOSCOPY URETEROSCOPY WITH LITHOTRIPSY HOLMIUM LASER, AND INSERTION STENT URETERAL;  Surgeon: Wagner Walton MD;  Location: [facility];  Service: Urology    [place] WRIST [place] LIG Right 3/3/2020    Procedure: RELEASE CARPAL TUNNEL ENDOSCOPIC Right;  Surgeon: Dominique Ospina MD;  Location: [facility];  Service: Orthopedics    [place] WRIST [place] LIG Left 3/18/2020    Procedure: RELEASE LEFT CARPAL TUNNEL ENDOSCOPIC;  Surgeon: Dominique Ospina MD;  [facility]: [facility];  Service: Orthopedics    STOMACH SURGERY      tummy tuck    TUBAL LIGATION         Family History   Problem Relation Age of Onset    Rheum arthritis Mother     Hypertension Mother     COPD Mother     Diabetes Father      I have reviewed and agree with the history as documented  E-Cigarette/Vaping    E-Cigarette Use Never User      E-Cigarette/Vaping Substances    Nicotine No     THC No     CBD No     Flavoring No     Other No     Unknown No      Social History     Tobacco Use    Smoking status: Current Every Day Smoker     Packs/day: 1 50     Years: 25 00     Pack years: 37 50     Types: Cigarettes    Smokeless tobacco: Never Used   Vaping Use    Vaping Use: Never used   Substance Use Topics    Alcohol use: No    Drug use: No       Review of Systems   Constitutional: Negative for chills, fatigue and fever  HENT: Negative for sore throat  Respiratory: Negative for cough and shortness of breath  Cardiovascular: Negative for chest pain  Gastrointestinal: Positive for nausea  Negative for constipation, diarrhea, hematemesis, hematochezia, melena and vomiting  Genitourinary: Positive for flank pain and hematuria  Negative for dysuria, vaginal bleeding and vaginal discharge  All other systems reviewed and are negative  Physical Exam  Physical Exam  Vitals reviewed  HENT:      Head: Atraumatic  Eyes:      Pupils: Pupils are equal, round, and reactive to light  Cardiovascular:      Rate and Rhythm: Normal rate and regular rhythm  Pulmonary:      Effort: Pulmonary effort is normal    Abdominal:      General: There is no distension  Tenderness: There is abdominal tenderness (L lower quadrant, NO RLQ/Mcburney's point tenderness  )  There is left CVA tenderness  There is no right CVA tenderness or guarding  Musculoskeletal:         General: No deformity  Cervical back: Neck supple  Skin:     General: Skin is warm and dry  Neurological:      General: No focal deficit present  Mental Status: She is alert     Psychiatric:         Mood and Affect: Mood normal          Vital Signs  ED Triage Vitals   Temperature Pulse Respirations Blood Pressure SpO2   09/07/21 0233 09/07/21 0233 09/07/21 0233 09/07/21 0233 09/07/21 0233   97 8 °F (36 6 °C) 66 18 159/72 98 %      Temp Source Heart Rate Source Patient Position - Orthostatic VS BP Location FiO2 (%)   09/07/21 0233 09/07/21 0233 09/07/21 0233 09/07/21 0233 --   Oral Monitor Sitting Left arm       Pain Score       09/07/21 0305       Worst Possible Pain           Vitals:    09/07/21 1530 09/07/21 1545 09/07/21 1600 09/07/21 1610   BP: 130/61 156/75 155/92 151/83   Pulse: 76 84 78 75   Patient Position - Orthostatic VS:             Visual Acuity      ED Medications  Medications   ketorolac (TORADOL) injection 15 mg (15 mg Intravenous Given 9/7/21 0305)   HYDROmorphone (DILAUDID) injection 0 5 mg (0 5 mg Intravenous Given 9/7/21 0305)   sodium chloride 0 9 % bolus 1,000 mL (0 mL Intravenous Stopped 9/7/21 0603)   HYDROmorphone (DILAUDID) injection 0 5 mg (0 5 mg Intravenous Given 9/7/21 0337)   HYDROmorphone (DILAUDID) injection 0 5 mg (0 5 mg Intravenous Given 9/7/21 0522)   ceFAZolin (ANCEF) IVPB (premix in dextrose) 1,000 mg 50 mL (0 mg Intravenous Stopped 9/7/21 1514)       Diagnostic Studies  Results Reviewed     Procedure Component Value Units Date/Time    CBC and differential [[id number]] Collected: 09/07/21 0844    Lab Status: Final result Specimen: Blood from Arm, Right Updated: 09/07/21 0856     WBC 6 32 Thousand/uL      RBC 4 39 Million/uL      Hemoglobin 12 8 g/dL      Hematocrit 39 2 %      MCV 89 fL      MCH 29 2 pg      MCHC 32 7 g/dL      RDW 13 8 %      MPV 9 2 fL      Platelets 227 Thousands/uL      nRBC 0 /100 WBCs      Neutrophils Relative 68 %      Immat GRANS % 0 %      Lymphocytes Relative 19 %      Monocytes Relative 8 %      Eosinophils Relative 4 %      Basophils Relative 1 %      Neutrophils Absolute 4 34 Thousands/µL      Immature Grans Absolute 0 01 Thousand/uL      Lymphocytes Absolute 1 17 Thousands/µL      Monocytes Absolute 0 53 Thousand/µL      Eosinophils Absolute 0 24 Thousand/µL      Basophils Absolute 0 03 Thousands/µL     UA w Reflex to Microscopic w Reflex to Culture [[id number]] Collected: 09/07/21 0510    Lab Status: Final result Specimen: Urine, Clean Catch Updated: 09/07/21 0518     Color, UA Yellow     Clarity, UA Cloudy     Specific Gravity, UA >=1 030     pH, UA 6 0     Leukocytes, UA Negative     Nitrite, UA Negative     Protein, UA Negative mg/dl      Glucose, UA Negative mg/dl      Ketones, UA Negative mg/dl      Urobilinogen, UA 0 2 E U /dl      Bilirubin, UA Negative     Blood, UA Negative    hCG, qualitative pregnancy [[id number]]  (Normal) Collected: 09/07/21 0305    Lab Status: Final result Specimen: Blood from Arm, Right Updated: 09/07/21 0333     Preg, Serum Negative    Comprehensive metabolic panel [[id number]]  (Abnormal) Collected: 09/07/21 0305    Lab Status: Final result Specimen: Blood from Arm, Right Updated: 09/07/21 0332     Sodium 144 mmol/L      Potassium 4 2 mmol/L      Chloride 106 mmol/L      CO2 30 mmol/L      ANION GAP 8 mmol/L      BUN 16 mg/dL      Creatinine 0 85 mg/dL      Glucose 83 mg/dL      Calcium 9 0 mg/dL      AST 17 U/L      ALT 24 U/L      Alkaline Phosphatase 201 U/L      Total Protein 7 4 g/dL      Albumin 3 7 g/dL      Total Bilirubin 0 40 mg/dL      eGFR 78 ml/min/1 73sq m     Narrative:      [place] guidelines for Chronic Kidney Disease (CKD):     Stage 1 with normal or high GFR (GFR > 90 mL/min/1 73 square meters)    Stage 2 Mild CKD (GFR = 60-89 mL/min/1 73 square meters)    Stage 3A Moderate CKD (GFR = 45-59 mL/min/1 73 square meters)    Stage 3B Moderate CKD (GFR = 30-44 mL/min/1 73 square meters)    Stage 4 Severe CKD (GFR = 15-29 mL/min/1 73 square meters)    Stage 5 End Stage CKD (GFR <15 mL/min/1 73 square meters)  Note: GFR calculation is accurate only with a steady state creatinine    CBC and differential [[id number]] Collected: 09/07/21 0305    Lab Status: Final result Specimen: Blood from Arm, Right Updated: 09/07/21 0313     WBC 9 59 Thousand/uL      RBC 4 92 Million/uL Hemoglobin 14 0 g/dL      Hematocrit 43 4 %      MCV 88 fL      MCH 28 5 pg      MCHC 32 3 g/dL      RDW 13 7 %      MPV 9 0 fL      Platelets 550 Thousands/uL      nRBC 0 /100 WBCs      Neutrophils Relative 64 %      Immat GRANS % 0 %      Lymphocytes Relative 23 %      Monocytes Relative 7 %      Eosinophils Relative 5 %      Basophils Relative 1 %      Neutrophils Absolute 6 17 Thousands/µL      Immature Grans Absolute 0 02 Thousand/uL      Lymphocytes Absolute 2 20 Thousands/µL      Monocytes Absolute 0 66 Thousand/µL      Eosinophils Absolute 0 49 Thousand/µL      Basophils Absolute 0 05 Thousands/µL                  CT abdomen pelvis wo contrast   Final Result by Prince Palomo DO (09/07 0331)      6 mm obstructing stone in the proximal left ureter with associated mild left hydroureteronephrosis and obstructive uropathy  Colonic diverticulosis without evidence of acute diverticulitis  Status post gastric bypass, small hiatal hernia  Other findings as above  Workstation performed: [id number]         US kidney and bladder    (Results Pending)   XR abdomen 1 view kub    (Results Pending)              Procedures  Procedures         ED Course                             SBIRT 20yo+      Most Recent Value   SBIRT (24 yo +)   In order to provide better care to our patients, we are screening all of our patients for alcohol and drug use  Would it be okay to ask you these screening questions?   No Filed at: 09/07/2021 6641                    MDM    Disposition  Final diagnoses:   Kidney stone on left side     Time reflects when diagnosis was documented in both MDM as applicable and the Disposition within this note     Time User Action Codes Description Comment    9/7/2021  5:27 AM Daphne Anyies Add [N20 0] Kidney stone on left side     9/7/2021  7:09 AM Levell Brian Add [N20 1] Left ureteral stone     9/7/2021  2:56 PM Levell Brian Add [R10 9] Left flank pain     9/7/2021  2:56 PM Waleska Levy Aly Almodovar [N13 30] Hydronephrosis of left kidney       ED Disposition     ED Disposition Condition Date/Time Comment    Admit Stable Tue Sep 7, 2021  5:42 AM Case was discussed with TUNDE and the patient's admission status was agreed to be Admission Status: observation status to the service of Dr Smooth Mendenhall           Follow-up Information     Follow up With Specialties Details Why Contact Info    Cherelle Machuca MD Urology Follow up The staff will call you with an appointment to see the nurses in 1 week to remove the stent by pulling on the string, then see 1 of us in 6 months with a renal ultrasound [address]  [place] [address]      Aleks Ralphs  Follow up in 1 week(s)  [address] [place] [address] Ronald Canas Si, DO Internal Medicine   [address]  [phone]            Discharge Medication List as of 9/7/2021  4:02 PM      START taking these medications    Details   cephalexin (KEFLEX) 500 mg capsule Take 1 capsule (500 mg total) by mouth every 6 (six) hours for 12 doses, Starting Tue 9/7/2021, Until Fri 9/10/2021, Normal      oxybutynin (DITROPAN) 5 mg tablet Take 1 tablet (5 mg total) by mouth 3 (three) times a day as needed (bladder spasm), Starting Tue 9/7/2021, Normal      phenazopyridine (PYRIDIUM) 200 mg tablet Take 1 tablet (200 mg total) by mouth 3 (three) times a day as needed for bladder spasms, Starting Tue 9/7/2021, Normal         CONTINUE these medications which have CHANGED    Details   HYDROcodone-acetaminophen (NORCO) 5-325 mg per tablet Take 1 tablet by mouth every 4 (four) hours as needed for pain for up to 10 daysMax Daily Amount: 6 tablets, Starting Tue 9/7/2021, Until Fri 9/17/2021 at 2359, Normal      traZODone (DESYREL) 150 mg tablet Take 1 tablet (150 mg total) by mouth daily at bedtime, Starting Tue 9/7/2021, Normal         CONTINUE these medications which have NOT CHANGED Details   albuterol (PROVENTIL HFA,VENTOLIN HFA) 90 mcg/act inhaler Inhale 2 puffs every 6 (six) hours as needed for wheezing, Historical Med      budesonide-formoterol (SYMBICORT) 160-4 5 mcg/act inhaler Inhale 2 puffs 2 (two) times a day, Starting Tue 12/10/2019, Historical Med      celecoxib (CeleBREX) 100 mg capsule take 1 capsule by mouth twice a day, Normal      dicyclomine (BENTYL) 20 mg tablet Take 20 mg by mouth daily, Historical Med      divalproex sodium (DEPAKOTE ER) 250 mg 24 hr tablet Take 1 tablet (250 mg total) by mouth daily, Starting Wed 8/25/2021, Normal      DULoxetine (CYMBALTA) 30 mg delayed release capsule Take 30 mg by mouth 2 (two) times a day , Starting Tue 11/12/2019, Until Tue 9/7/2021, Historical Med      ferrous sulfate 324 (65 Fe) mg Take 1 tablet (324 mg total) by mouth 2 (two) times a day before meals, Starting Thu 3/5/2020, Normal      !! folic acid (RA Folic Acid) 177 mcg tablet Take 1 tablet (400 mcg total) by mouth daily, Starting Thu 6/25/2020, Normal      gabapentin (NEURONTIN) 600 MG tablet Take 600 mg by mouth 2 (two) times a day , Historical Med      magnesium oxide (MAG-OX) 400 mg Take 400 mg by mouth 2 (two) times a day, Historical Med      ondansetron (ZOFRAN-ODT) 4 mg disintegrating tablet Take 4 mg by mouth as needed , Starting Tue 7/9/2019, Historical Med      pantoprazole (PROTONIX) 40 mg tablet 40 mg, Starting Fri 7/26/2019, Historical Med      !! RA FOLIC ACID 648 MCG tablet take 1 tablet by mouth once daily, Normal      acetaminophen (TYLENOL) 650 mg CR tablet Take 1 tablet by mouth every 8 (eight) hours, Starting Mon 7/22/2019, Historical Med      albuterol (2 5 mg/3 mL) 0 083 % nebulizer solution Take 2 5 mg by nebulization 4 (four) times a day, Historical Med      ipratropium (ATROVENT) 0 02 % nebulizer solution Inhale 0 25 mg every 6 (six) hours as needed, Starting Tue 10/1/2019, Until Tue 9/7/2021 at 2359, Olive Oscar !! - Potential duplicate medications found  Please discuss with provider  STOP taking these medications       ciprofloxacin (CIPRO) 500 mg tablet Comments:   Reason for Stopping:         oxyCODONE-acetaminophen (PERCOCET) 5-325 mg per tablet Comments:   Reason for Stopping:         tamsulosin (FLOMAX) 0 4 mg Comments:   Reason for Stopping:             Outpatient Discharge Orders   US kidney and bladder   Standing Status: Future Standing Exp   Date: 09/07/25       PDMP Review       Value Time User    PDMP Reviewed  Yes 9/7/2021  2:20 PM Marcelina Brower MD          ED Provider  Electronically Signed by           Aurea Rodriguez MD  09/09/21 6872

## 2021-09-07 NOTE — TELEPHONE ENCOUNTER
Provider pool: Dr Mary Douglas patient  The pharmacy called because prior authorization is needed for hydrocodone refill  Insurance only allows 5 tablets of an opiod every 6 months

## 2021-09-07 NOTE — DISCHARGE INSTRUCTIONS
Expect to see blood in the urine, and to experience urgency/frequency/burning with urination and dribbling  This is normal after urological procedures  Is also normal to experience some nausea after these procedures  Go back your regular diet carefully  It is normal to feel pain in the kidney when urinating and when the bladder is filling due to urine refluxing up to the kidney because of the open stent  The stent is necessary to keep the ureter open to allow clots and swelling to resolve and to allow the kidney to drain properly after instrumentation  Some stones may pass around the stent, but most stones pass after the stent is removed  The presence of the stent makes the ureter wider while it is in and after has been removed, allowing passage of larger fragments  Call for fever greater that 101 5, inability to urinate, prolonged nausea and vomiting, or severe pain not relieved by pain medications  Daria Denise Keep stent string taped- if it becomes dislodged or gets pulled out early, that is OK- simply remove it completely by pulling on string until it is completely out  No driving/operating machinery for 24 hours, and while taking narcotics  Take over the counter remedy of choice to avoid constipation  Drink plenty of fluids

## 2021-09-07 NOTE — ASSESSMENT & PLAN NOTE
Patient with known left kidney stone, has outpatient cysto and lithos this Thursday  Presenting to the ED in uncontrolled pain    · UA negative for UTI; afebrile  · Pregnancy POCT negative  · No leukocytosis  · Urology consult appreciated  · Patient underwentcystoscopy left ureteroscopy laser lithotripsy left ureteral stent placement      · She will be sent home Norco, Pyridium, Keflex, and oxybutynin   · She will follow-up with urology clinic next week for stent removal

## 2021-09-07 NOTE — H&P
[address] [place] [address]  H&P- Lakshmi Johnson 1965, 49 y o  female MRN: [id number]  Unit/Bed#: ED 19 Encounter: [id number]  Primary Care Provider: Simone Walsh   Date and time admitted to hospital: 9/7/2021  2:44 AM    * Kidney stone on left side  Assessment & Plan  Patient with known left kidney stone, has outpatient cysto and lithos this Thursday  Presenting to the ED in uncontrolled pain    · UA negative for UTI; afebrile  · Pregnancy POCT negative  · No leukocytosis  · Urology consulted; cystoscopy and stone removal to be performed today  · NPO  · Pain control    Diabetes mellitus ([facility] [address] )  Assessment & Plan  Lab Results   Component Value Date    HGBA1C 5 1 04/04/2018       No results for input(s): POCGLU in the last 72 hours  · Latest blood glucose 83  · Monitor; consider SSI if uncontrolled      Hypertension  Assessment & Plan  · Latest /70  · Likely elevated in setting of pain  · Pain control  · Continue home HTN medications  · Monitor per unit protocol    COPD (chronic obstructive pulmonary disease) (HCC)  Assessment & Plan  · 98% O2 RA  · Not SOB  · Continue home prn inhalers if needed    GERD (gastroesophageal reflux disease)  Assessment & Plan  · Continue home protonix    VTE Pharmacologic Prophylaxis: VTE Score: 2 Low Risk (Score 0-2) - Encourage Ambulation  Code Status: Level 1 - Full Code   Discussion with family: Contact to be updated in AM      Anticipated Length of Stay: Patient will be admitted on an observation basis with an anticipated length of stay of less than 2 midnights secondary to kidney stone and uncontrolled pain  Total Time for Visit, including Counseling / Coordination of Care: 45 minutes Greater than 50% of this total time spent on direct patient counseling and coordination of care      Chief Complaint: left flank/abominal pain    History of Present Illness:  Lakshmi Johnson is a 49 y o  female with a PMH of COPD, HTN, CAD, DM, and GERD who presents with left flank/abdominal pain and a known kidney stone  Patient has been having on/off left flank/abd pain for past month due to known kidney stone  Patient reports waking at 12am in "excrutiating," pain, prompting a visit to the ED  Has outpatient cystoscopy for stone removal this Thursday  Review of Systems:  Review of Systems   Constitutional: Negative for activity change, appetite change, chills, diaphoresis, fatigue and fever  HENT: Negative for congestion, ear pain, nosebleeds and trouble swallowing  Eyes: Negative for pain and visual disturbance  Respiratory: Negative for apnea, cough, chest tightness, shortness of breath and wheezing  Cardiovascular: Negative for chest pain, palpitations and leg swelling  Gastrointestinal: Positive for abdominal pain (left-sided) and nausea  Negative for abdominal distention, blood in stool, constipation, diarrhea and vomiting  Endocrine: Negative for cold intolerance, heat intolerance and polyuria  Genitourinary: Positive for flank pain  Negative for difficulty urinating, dysuria and hematuria  Musculoskeletal: Negative for arthralgias, neck pain and neck stiffness  Skin: Negative for color change, rash and wound  Neurological: Negative for dizziness, tremors, syncope, weakness, light-headedness, numbness and headaches  Hematological: Negative for adenopathy  All other systems reviewed and are negative        Past Medical and Surgical History:   Past Medical History:   Diagnosis Date    Arthritis     Asthma     COPD (chronic obstructive pulmonary disease) ([facility] [address] )     Coronary artery disease     Depression     Diabetes mellitus (HCC)     with weight loss no longer has high BS    Diverticulitis     Fibromyalgia     Fibromyalgia, primary     GERD (gastroesophageal reflux disease)     Hypertension     lost weight and no longer has    Migraine     Pancreatitis     Stomach problems        Past Surgical History:   Procedure Laterality Date    ABDOMINAL SURGERY      CHOLECYSTECTOMY      GALLBLADDER SURGERY      GASTRIC BYPASS      PANNICULECTOMY  2017    SD WRIST Ladean Cynthia LIG Right 3/3/2020    Procedure: RELEASE CARPAL TUNNEL ENDOSCOPIC Right;  Surgeon: Jose Angel Hagen MD;  Location: [facility];  Service: Orthopedics    SD WRIST Ladean Cynthia LIG Left 3/18/2020    Procedure: RELEASE LEFT CARPAL TUNNEL ENDOSCOPIC;  Surgeon: Jose Angel Hagen MD;  Location: [facility];  Service: Orthopedics    STOMACH SURGERY      tummy tuck    TUBAL LIGATION         Meds/Allergies:  Prior to Admission medications    Medication Sig Start Date End Date Taking?  Authorizing Provider   acetaminophen (TYLENOL) 650 mg CR tablet Take 1 tablet by mouth every 8 (eight) hours 7/22/19   Historical Provider, MD   albuterol (2 5 mg/3 mL) 0 083 % nebulizer solution Take 2 5 mg by nebulization 4 (four) times a day    Historical Provider, MD   albuterol (PROVENTIL HFA,VENTOLIN HFA) 90 mcg/act inhaler Inhale 2 puffs every 6 (six) hours as needed for wheezing    Historical Provider, MD   ascorbic acid (RA Vitamin C) 500 MG tablet Take 1 tablet (500 mg total) by mouth daily  Patient not taking: Reported on 8/25/2021 10/5/20   Oni Maynard PA-C   budesonide-formoterol [facility]) 160-4 5 mcg/act inhaler Inhale 2 puffs 2 (two) times a day 12/10/19   Historical Provider, MD   celecoxib (CeleBREX) 100 mg capsule take 1 capsule by mouth twice a day 5/31/20   Mercy Lucero MD   ciprofloxacin (CIPRO) 500 mg tablet Take 1 tablet (500 mg total) by mouth every 12 (twelve) hours for 5 days 9/2/21 9/7/21  Man Herrera PA-C   cyclobenzaprine (FLEXERIL) 10 mg tablet Take 1 tablet (10 mg total) by mouth daily at bedtime  Patient not taking: Reported on 8/25/2021 1/22/20   Mercy Lucero MD   dicyclomine (BENTYL) 20 mg tablet Take 20 mg by mouth daily    Historical Provider, MD   divalproex sodium (DEPAKOTE ER) 250 mg 24 hr tablet Take 1 tablet (250 mg total) by mouth daily 8/25/21   Joselyn Peace MD   DULoxetine (CYMBALTA) 30 mg delayed release capsule Take 30 mg by mouth 2 (two) times a day  11/12/19 8/25/21  Historical Provider, MD   enoxaparin (LOVENOX) 40 mg/0 4 mL Inject 0 4 mL (40 mg total) under the skin daily for 28 days Starting after surgery  Patient not taking: Reported on 8/25/2021 3/5/20 4/2/20  Matthew Carreon PA-C   ergocalciferol (VITAMIN D2) 50,000 units Take 50,000 Units by mouth once a week  Patient not taking: Reported on 8/31/2021    Historical Provider, MD   ferrous sulfate 324 (65 Fe) mg Take 1 tablet (324 mg total) by mouth 2 (two) times a day before meals  Patient taking differently: Take 324 mg by mouth daily before breakfast  3/5/20   Matthew Carreon PA-C   folic acid (RA Folic Acid) 017 mcg tablet Take 1 tablet (400 mcg total) by mouth daily 6/25/20   Verna Cheng PA-C   gabapentin (NEURONTIN) 600 MG tablet Take 600 mg by mouth 2 (two) times a day     Historical Provider, MD   HYDROcodone-acetaminophen (NORCO) 5-325 mg per tablet Take 1 tablet by mouth every 6 (six) hours as needed for painMax Daily Amount: 4 tablets 2/27/20   Becky Burroughs PA-C   ipratropium (ATROVENT) 0 02 % nebulizer solution Inhale 0 25 mg every 6 (six) hours as needed 10/1/19 8/25/21  Historical Provider, MD   magnesium oxide (MAG-OX) 400 mg Take 400 mg by mouth 2 (two) times a day    Historical Provider, MD   mometasone [facility]) 220 MCG/INH inhaler Inhale 220 mcg daily 10/2/19   Historical Provider, MD   Multiple Vitamins-Minerals (Tab-A-Charles) TABS Take 1 tablet by mouth daily  Patient not taking: Reported on 8/31/2021 10/5/20   Verna Cheng PA-C   NON FORMULARY     Historical Provider, MD   ondansetron (ZOFRAN-ODT) 4 mg disintegrating tablet Take 4 mg by mouth as needed  7/9/19   Historical Provider, MD   oxyCODONE-acetaminophen (PERCOCET) 5-325 mg per tablet Take 1 tablet by mouth every 6 (six) hours as needed for severe painMax Daily Amount: 4 tablets 8/31/21   POI TIMBO Smith   pantoprazole (PROTONIX) 40 mg tablet 40 mg 7/26/19   Historical Provider, MD BUCKLEY FOLIC ACID 785 MCG tablet take 1 tablet by mouth once daily  Patient not taking: Reported on 8/25/2021 6/30/20   Nicole Santillan PA-C   tamsulosin [facility]) 0 4 mg Take 1 capsule (0 4 mg total) by mouth daily with dinner 8/31/21   Rebeca TIMBO Smith   tiotropium (SPIRIVA RESPIMAT) 2 5 MCG/ACT AERS inhaler Inhale daily  Patient not taking: Reported on 8/25/2021 1/27/20   Historical Provider, MD   tiotropium-olodaterol (Brandon Riding) 2 5-2 5 MCG/ACT inhaler Inhale 2 puffs daily  Patient not taking: Reported on 8/25/2021 12/10/19   Historical Provider, MD   traZODone (DESYREL) 150 mg tablet Take 150 mg by mouth daily at bedtime    Historical Provider, MD     I have reviewed home medications with patient personally  Allergies: No Known Allergies    Social History:  Marital Status: /Civil Union   Occupation: N/A  Patient Pre-hospital Living Situation: Home  Patient Pre-hospital Level of Mobility: walks  Patient Pre-hospital Diet Restrictions: None  Substance Use History:   Social History     Substance and Sexual Activity   Alcohol Use No     Social History     Tobacco Use   Smoking Status Current Every Day Smoker    Packs/day: 0 50    Years: 25 00    Pack years: 12 50    Types: Cigarettes   Smokeless Tobacco Never Used     Social History     Substance and Sexual Activity   Drug Use No       Family History:  Family History   Problem Relation Age of Onset    Rheum arthritis Mother     Hypertension Mother     COPD Mother     Diabetes Father        Physical Exam:     Vitals:   Blood Pressure: 140/70 (09/07/21 0500)  Pulse: 66 (09/07/21 0500)  Temperature: 98 °F (36 7 °C) (09/07/21 0500)  Temp Source: Oral (09/07/21 0500)  Respirations: 18 (09/07/21 0500)  SpO2: 98 % (09/07/21 0500)    Physical Exam  Vitals and nursing note reviewed     Constitutional:       General: She is not in acute distress  Appearance: Normal appearance  HENT:      Head: Normocephalic and atraumatic  Right Ear: External ear normal       Left Ear: External ear normal       Nose: Nose normal       Mouth/Throat:      Mouth: Mucous membranes are moist    Eyes:      Pupils: Pupils are equal, round, and reactive to light  Cardiovascular:      Rate and Rhythm: Normal rate and regular rhythm  Pulses: Normal pulses  Heart sounds: Normal heart sounds  No murmur heard  Pulmonary:      Effort: Pulmonary effort is normal  No respiratory distress  Breath sounds: Normal breath sounds  No wheezing or rales  Chest:      Chest wall: No tenderness  Abdominal:      General: Bowel sounds are normal  There is no distension  Palpations: Abdomen is soft  There is no mass  Tenderness: There is abdominal tenderness (left abdomen and flank)  There is no guarding  Musculoskeletal:         General: No swelling or tenderness  Cervical back: Normal range of motion and neck supple  No rigidity or tenderness  Right lower leg: No edema  Left lower leg: No edema  Skin:     General: Skin is warm and dry  Capillary Refill: Capillary refill takes less than 2 seconds  Findings: No lesion or rash  Neurological:      General: No focal deficit present  Mental Status: She is alert     Psychiatric:         Mood and Affect: Mood normal         Additional Data:     Lab Results:  Results from last 7 days   Lab Units 09/07/21  0305   WBC Thousand/uL 9 59   HEMOGLOBIN g/dL 14 0   HEMATOCRIT % 43 4   PLATELETS Thousands/uL 315   NEUTROS PCT % 64   LYMPHS PCT % 23   MONOS PCT % 7   EOS PCT % 5     Results from last 7 days   Lab Units 09/07/21  0305   SODIUM mmol/L 144   POTASSIUM mmol/L 4 2   CHLORIDE mmol/L 106   CO2 mmol/L 30   BUN mg/dL 16   CREATININE mg/dL 0 85   ANION GAP mmol/L 8   CALCIUM mg/dL 9 0   ALBUMIN g/dL 3 7   TOTAL BILIRUBIN mg/dL 0 40   ALK PHOS U/L 201*   ALT U/L 24   AST U/L 17   GLUCOSE RANDOM mg/dL 83                       Imaging: Reviewed radiology reports from this admission including: abdominal/pelvic CT  CT abdomen pelvis wo contrast   Final Result by Antwan Mason DO (09/07 0331)      6 mm obstructing stone in the proximal left ureter with associated mild left hydroureteronephrosis and obstructive uropathy  Colonic diverticulosis without evidence of acute diverticulitis  Status post gastric bypass, small hiatal hernia  Other findings as above  Workstation performed: [id number]             EKG and Other Studies Reviewed on Admission:   · EKG: No EKG obtained  ** Please Note: This note has been constructed using a voice recognition system   **

## 2021-09-07 NOTE — ASSESSMENT & PLAN NOTE
· Latest /70  · Likely elevated in setting of pain  · Pain control  · Continue home HTN medications  · Monitor per unit protocol

## 2021-09-07 NOTE — QUICK NOTE
Patient underwent successful cystoscopy left ureteroscopy laser lithotripsy left ureteral stent placement  The office has been notified to set up an appointment for her next week for stent removal, and I have sent prescriptions for Norco, Pyridium, Keflex, and oxybutynin to her pharmacy  Instructions are on the chart  From my standpoint she can be discharged

## 2021-09-07 NOTE — ASSESSMENT & PLAN NOTE
Lab Results   Component Value Date    HGBA1C 5 1 04/04/2018       Recent Labs     09/07/21  1508   POCGLU 94     · Latest blood glucose 83  · Monitor; consider SSI if uncontrolled

## 2021-09-07 NOTE — TELEPHONE ENCOUNTER
----- Message from Venkatesh Damon MD sent at 9/7/2021  2:58 PM EDT -----  Please call patient for nurses remove stent by pulling on the string in 1 week, then see an MD or PA in 6 months with a renal ultrasound  She underwent cystoscopy left ureteroscopy laser lithotripsy left ureteral stent placement by me today  The stent has a string

## 2021-09-07 NOTE — TELEPHONE ENCOUNTER
inpt -09/07/21  Scheduled 09/15/21 @ 11 AM for stent removal   Scheduled 03/07/22@ 10:30 AM for 6m f/u w/U/S ptv ( order on chart - need to give Central scheduling number )

## 2021-09-07 NOTE — ASSESSMENT & PLAN NOTE
Lab Results   Component Value Date    HGBA1C 5 1 04/04/2018       No results for input(s): POCGLU in the last 72 hours    · Latest blood glucose 83  · Monitor; consider SSI if uncontrolled

## 2021-09-07 NOTE — ASSESSMENT & PLAN NOTE
Patient with known left kidney stone, has outpatient cysto and lithos this Thursday   Presenting to the ED in uncontrolled pain    · UA negative for UTI; afebrile  · Pregnancy POCT negative  · No leukocytosis  · Urology consulted; cystoscopy and stone removal to be performed today  · NPO  · Pain control

## 2021-09-07 NOTE — PROGRESS NOTES
[address] [facility]  Progress Note Inetta Graft 1965, 49 y o  female MRN: [id number]  Unit/Bed#: ED 19 Encounter: [id number]  Primary Care Provider: Nazia Nash   Date and time admitted to hospital: 9/7/2021  2:44 AM    * Kidney stone on left side  Assessment & Plan  Patient with known left kidney stone, has outpatient cysto and lithos this Thursday  Presenting to the ED in uncontrolled pain    · UA negative for UTI; afebrile  · Pregnancy POCT negative  · No leukocytosis  · Urology consulted; cystoscopy and stone removal to be performed today  · NPO  · Pain control    Diabetes mellitus ([address] )  Assessment & Plan  Lab Results   Component Value Date    HGBA1C 5 1 04/04/2018       No results for input(s): POCGLU in the last 72 hours  · Latest blood glucose 83  · Monitor; consider SSI if uncontrolled      Hypertension  Assessment & Plan  · Latest /70  · Likely elevated in setting of pain  · Pain control  · Continue home HTN medications  · Monitor per unit protocol    COPD (chronic obstructive pulmonary disease) (HCC)  Assessment & Plan  · 98% O2 RA  · Not SOB  · Continue home prn inhalers if needed    VTE Pharmacologic Prophylaxis:   Pharmacologic: Held for cystoscopy  Mechanical VTE Prophylaxis in Place: Yes    Patient Centered Rounds: I have performed bedside rounds with nursing staff today  Discussions with Specialists or Other Care Team Provider:  Cm and nursing,   Education and Discussions with Family / Patient:  With patient  Patient did not want me to call any family member    Time Spent for Care: 45 minutes  More than 50% of total time spent on counseling and coordination of care as described above  Current Length of Stay: 0 day(s)    Current Patient Status: Observation   Certification Statement: The patient will continue to require additional inpatient hospital stay due to Cystoscopy with stent placement today    Discharge Plan:   Today or tomorrow    Code Status: Level 1 - Full Code      Subjective:   Patient seen examined bedside  No acute events continue to complain of left flank pain, no nausea or vomiting    Objective:     Vitals:   Temp (24hrs), Av 9 °F (36 6 °C), Min:97 8 °F (36 6 °C), Max:98 °F (36 7 °C)    Temp:  [97 8 °F (36 6 °C)-98 °F (36 7 °C)] 98 °F (36 7 °C)  HR:  [66] 66  Resp:  [18] 18  BP: (140-159)/(70-72) 140/70  SpO2:  [98 %] 98 %  There is no height or weight on file to calculate BMI  Input and Output Summary (last 24 hours): Intake/Output Summary (Last 24 hours) at 2021 1106  Last data filed at 2021 0603  Gross per 24 hour   Intake 1000 ml   Output --   Net 1000 ml       Physical Exam:     Physical Exam  Vitals and nursing note reviewed  Constitutional:       General: She is not in acute distress  HENT:      Head: Normocephalic  Nose: Nose normal       Mouth/Throat:      Pharynx: Oropharynx is clear  Eyes:      Conjunctiva/sclera: Conjunctivae normal    Cardiovascular:      Rate and Rhythm: Normal rate and regular rhythm  Heart sounds: No murmur heard  Pulmonary:      Effort: Pulmonary effort is normal  No respiratory distress  Breath sounds: Normal breath sounds  No wheezing  Abdominal:      General: There is no distension  Tenderness: There is abdominal tenderness  There is no guarding  Comments: Left CVA tenderness  Left lower abdominal tenderness   Musculoskeletal:         General: No swelling  Normal range of motion  Right lower leg: No edema  Skin:     General: Skin is warm  Capillary Refill: Capillary refill takes less than 2 seconds  Neurological:      General: No focal deficit present  Mental Status: She is alert and oriented to person, place, and time  Cranial Nerves: No cranial nerve deficit     Psychiatric:         Mood and Affect: Mood normal            Additional Data:     Labs:    Results from last 7 days   Lab Units 21  0844   WBC Thousand/uL 6 32 HEMOGLOBIN g/dL 12 8   HEMATOCRIT % 39 2   PLATELETS Thousands/uL 248   NEUTROS PCT % 68   LYMPHS PCT % 19   MONOS PCT % 8   EOS PCT % 4     Results from last 7 days   Lab Units 09/07/21  0305   SODIUM mmol/L 144   POTASSIUM mmol/L 4 2   CHLORIDE mmol/L 106   CO2 mmol/L 30   BUN mg/dL 16   CREATININE mg/dL 0 85   ANION GAP mmol/L 8   CALCIUM mg/dL 9 0   ALBUMIN g/dL 3 7   TOTAL BILIRUBIN mg/dL 0 40   ALK PHOS U/L 201*   ALT U/L 24   AST U/L 17   GLUCOSE RANDOM mg/dL 83                           * I Have Reviewed All Lab Data Listed Above  * Additional Pertinent Lab Tests Reviewed:  [address] Admission Reviewed    Imaging:    Imaging Reports Reviewed Today Include:  CT abdomen pelvis  Imaging Personally Reviewed by Myself Includes:  None    Recent Cultures (last 7 days):     Results from last 7 days   Lab Units 08/31/21  1535   URINE CULTURE  >100,000 cfu/ml Escherichia coli*       Last 24 Hours Medication List:   Current Facility-Administered Medications   Medication Dose Route Frequency Provider Last Rate    acetaminophen  650 mg Oral Q6H PRN Claudia Best, PA-C      dicyclomine  20 mg Oral Daily Claudia Best, [facility]      divalproex sodium  250 mg Oral Daily [facility]      DULoxetine  30 mg Oral BID [facility]ie [facility]      ferrous sulfate  325 mg Oral Daily With Breakfast Claudia [facility], [facility]      folic acid  512 mcg Oral Daily Claudia [facility], [address] [facility]      gabapentin  600 mg Oral BID [facility]      HYDROmorphone  0 5 mg Intravenous Q4H PRN Blondie Paras [place], [address] [facility]      ketorolac  15 mg Intravenous Q6H PRN Blondie Paras [place], [facility]      magnesium oxide  400 mg Oral BID Claudia [facility], [address] [facility]      nicotine  1 patch Transdermal Daily Claudia Best, [address] [facility]      ondansetron  4 mg Intravenous Q6H PRN Blondie Paras [place], [facility]      oxyCODONE  10 mg Oral Q4H PRN Blondie Paras [place], PA-C      pantoprazole  40 mg Oral Early Morning Claudia [facility], [address] [facility]     Ike Roles tamsulosin  0 4 mg Oral Daily With [address] [place], [place]      traZODone  150 mg Oral HS [place], [place]          Today, Patient Was Seen By: Rex Flores MD    ** Please Note: Dictation voice to text software may have been used in the creation of this document   **

## 2021-09-07 NOTE — CONSULTS
Consults: UROLOGY  Maikel Mayes 49 y o  female [id number]   Unit/Bed #: ED 19  Encounter: [id number]        Assessment  & Plan  :    Ureterolithiasis:  -noted on CT scan revealing a 6 mm obstructing stone in the proximal left ureter with associated mild left hydroureteronephrosis proximal to this  Left-sided perinephric fat stranding is noted  -will plan for cystoscopy ureteroscopy holmium laser and left ureteral stent placement, possible today   -patient to remain NPO, sips with meds   -discussed procedure in depth with patient, discussed risk factors including bleeding, infection, need for additional stone procedures and damage to nearby structures  Patient understands that she may be going home with stent with a string and stent without a string   -consent to be obtained at time of procedure  -UA negative for leukocytes, nitrates, blood   -creatinine 0 85, baseline 0 61  -no leukocytosis  -hemoglobin 14 0  -patient afebrile    Subjective :    Maikel Mayes  is a 49 y o  female who was admitted for 6 mm obstructing stone with uncontrolled flank pain  Patient was originally seen at [facility] [place] she had a CT scan performed which revealed mild left hydronephrosis with prominence of left renal pelvis secondary to 7-8 mm calculus at the left ureteropelvic junction compatible with obstructive uropathy  Patient had followed up with [facility] urology for evaluation  Patient continued to be to experience severe flank pain intermittently  Patient currently in the process of being fast tracked for kidney stones  Scheduled on 09/09/2021 with Dr Rose Waters  Patient presented to [facility] due to continued severe flank pain despite medications  She is currently reporting that her pain is located on the left flank radiating down to the left abdomen is suprapubic area  She reports nausea  Denies any vomiting,  fevers, chills  She denies any prior kidney stones    She denies any prior urologic procedures  She does report that she had gastric bypass procedure and has lot of abdominal pain issues along with fibromyalgia  Denies any adverse reactions to anesthesia in the past   Patient does has significant history of COPD  No Known Allergies   Current Outpatient Medications   Medication Instructions    acetaminophen (TYLENOL) 650 mg CR tablet 1 tablet, Oral, Every 8 hours    albuterol (PROVENTIL HFA,VENTOLIN HFA) 90 mcg/act inhaler 2 puffs, Inhalation, Every 6 hours PRN    albuterol 2 5 mg, Nebulization, 4 times daily    ascorbic acid (RA VITAMIN C) 500 mg, Oral, Daily    budesonide-formoterol (SYMBICORT) 160-4 5 mcg/act inhaler 2 puffs, Inhalation, 2 times daily    celecoxib (CeleBREX) 100 mg capsule take 1 capsule by mouth twice a day    ciprofloxacin (CIPRO) 500 mg, Oral, Every 12 hours scheduled    cyclobenzaprine (FLEXERIL) 10 mg, Oral, Daily at bedtime    dicyclomine (BENTYL) 20 mg, Oral, Daily    divalproex sodium (DEPAKOTE ER) 250 mg, Oral, Daily    DULoxetine (CYMBALTA) 30 mg, Oral, 2 times daily    ergocalciferol (VITAMIN D2) 50,000 Units, Weekly    ferrous sulfate 324 mg, Oral, 2 times daily before meals    folic acid (RA FOLIC ACID) 646 mcg, Oral, Daily    gabapentin (NEURONTIN) 600 mg, Oral, 2 times daily    HYDROcodone-acetaminophen (NORCO) 5-325 mg per tablet 1 tablet, Oral, Every 6 hours PRN    ipratropium (ATROVENT) 0 25 mg, Inhalation, Every 6 hours PRN    magnesium oxide (MAG-OX) 400 mg, Oral, 2 times daily    mometasone (ASMANEX TWISTHALER) 220 mcg, Inhalation, Daily    Multiple Vitamins-Minerals (Tab-A-Charles) TABS 1 tablet, Oral, Daily    NON FORMULARY No dose, route, or frequency recorded      ondansetron (ZOFRAN-ODT) 4 mg, Oral, As needed    oxyCODONE-acetaminophen (PERCOCET) 5-325 mg per tablet 1 tablet, Oral, Every 6 hours PRN    pantoprazole (PROTONIX) 40 mg    RA FOLIC ACID 260 MCG tablet take 1 tablet by mouth once daily    tamsulosin (FLOMAX) 0 4 mg, Oral, Daily with dinner    tiotropium (SPIRIVA RESPIMAT) 2 5 MCG/ACT AERS inhaler Daily    tiotropium-olodaterol (STIOLTO RESPIMAT) 2 5-2 5 MCG/ACT inhaler 2 puffs, Daily    traZODone (DESYREL) 150 mg, Daily at bedtime      Past Medical History:   Diagnosis Date    Arthritis     Asthma     COPD (chronic obstructive pulmonary disease) ([facility] )     Coronary artery disease     Depression     Diabetes mellitus ([facility] )     with weight loss no longer has high BS    Diverticulitis     Fibromyalgia     Fibromyalgia, primary     GERD (gastroesophageal reflux disease)     Hypertension     lost weight and no longer has    Migraine     Pancreatitis     Stomach problems      Past Surgical History:   Procedure Laterality Date    ABDOMINAL SURGERY      CHOLECYSTECTOMY      GALLBLADDER SURGERY      GASTRIC BYPASS      PANNICULECTOMY  2017    NC WRIST Christia Zacarias LIG Right 3/3/2020    Procedure: RELEASE CARPAL TUNNEL ENDOSCOPIC Right;  Surgeon: Torri Mai MD;  Location: [facility];  Service: Orthopedics    NC WRIST Christia Zacarias LIG Left 3/18/2020    Procedure: RELEASE LEFT CARPAL TUNNEL ENDOSCOPIC;  Surgeon: Torri Mai MD;  Location: [facility];  Service: Orthopedics    STOMACH SURGERY      tummy tuck    TUBAL LIGATION       Family History   Problem Relation Age of Onset    Rheum arthritis Mother     Hypertension Mother     COPD Mother     Diabetes Father      Social History     Socioeconomic History    Marital status: /Civil Union     Spouse name: None    Number of children: None    Years of education: None    Highest education level: None   Occupational History    None   Tobacco Use    Smoking status: Current Every Day Smoker     Packs/day: 0 50     Years: 25 00     Pack years: 12 50     Types: Cigarettes    Smokeless tobacco: Never Used   Vaping Use    Vaping Use: Never used   Substance and Sexual Activity    Alcohol use: No    Drug use: No    Sexual activity: None   Other Topics Concern    None   Social History Narrative    None     Social Determinants of Health     Financial Resource Strain:     Difficulty of Paying Living Expenses:    Food Insecurity:     Worried About Running Out of Food in the Last Year:     [address] in the Last Year:    Transportation Needs:     Lack of Transportation (Medical):  Lack of Transportation (Non-Medical):    Physical Activity:     Days of Exercise per Week:     Minutes of Exercise per Session:    Stress:     Feeling of Stress :    Social Connections:     Frequency of Communication with Friends and Family:     Frequency of Social Gatherings with Friends and Family:     Attends [employer] Services:     Active Member of Clubs or Organizations:     Attends Club or Organization Meetings:     Marital Status:    Intimate Partner Violence:     Fear of Current or Ex-Partner:     Emotionally Abused:     Physically Abused:     Sexually Abused:         Review of Systems   Constitutional: Negative  Negative for chills and fever  HENT: Negative  Eyes: Negative  Respiratory: Negative  Cardiovascular: Negative  Gastrointestinal: Positive for abdominal pain and nausea  Negative for diarrhea and vomiting  Endocrine: Negative  Genitourinary: Positive for flank pain and pelvic pain  Negative for difficulty urinating, dysuria and hematuria  Skin: Negative  Allergic/Immunologic: Negative  Neurological: Negative  Hematological: Negative  Psychiatric/Behavioral: Negative  Objective     Physical Exam  Constitutional:       General: She is not in acute distress  Appearance: She is normal weight  She is not ill-appearing, toxic-appearing or diaphoretic  HENT:      Head: Normocephalic and atraumatic  Right Ear: External ear normal       Left Ear: External ear normal       Nose: Nose normal       Mouth/Throat:      Pharynx: Oropharynx is clear     Eyes: General: No scleral icterus  Conjunctiva/sclera: Conjunctivae normal    Cardiovascular:      Rate and Rhythm: Normal rate and regular rhythm  Pulses: Normal pulses  Heart sounds: Normal heart sounds  No murmur heard  No friction rub  No gallop  Pulmonary:      Effort: Pulmonary effort is normal  No respiratory distress  Breath sounds: Wheezing present  No rhonchi or rales  Comments: Wheezes heard throughout all lobes  Abdominal:      General: Bowel sounds are normal  There is no distension  Palpations: Abdomen is soft  Tenderness: There is abdominal tenderness  There is left CVA tenderness  There is no right CVA tenderness, guarding or rebound  Comments: Tenderness in left lower quadrant suprapubic area, left CVA tenderness   Musculoskeletal:         General: Normal range of motion  Cervical back: Normal range of motion  Skin:     General: Skin is warm and dry  Neurological:      General: No focal deficit present  Mental Status: She is alert and oriented to person, place, and time  Psychiatric:         Mood and Affect: Mood normal          Behavior: Behavior normal          Thought Content: Thought content normal          Judgment: Judgment normal                 Imaging:  CT ABDOMEN AND PELVIS WITH IV CONTRAST     INDICATION:   Periumbilical pain      COMPARISON:  None      TECHNIQUE:  CT examination of the abdomen and pelvis was performed  Axial, sagittal, and coronal 2D reformatted images were created from the source data and submitted for interpretation      Radiation dose length product (DLP) for this visit:  469 mGy-cm     This examination, like all CT scans performed in the [facility], was performed utilizing techniques to minimize radiation dose exposure, including the use of iterative   reconstruction and automated exposure control      IV Contrast:  100 mL of iohexol (OMNIPAQUE)  Enteric Contrast:  Enteric contrast was not administered      FINDINGS:     ABDOMEN     LOWER CHEST:  Clear lung bases      LIVER/BILIARY TREE:  Mild distention of the proximal intrahepatic bile ducts and the common bile duct, likely postsurgical   No evidence of biliary obstruction or choledocholithiasis      GALLBLADDER:  Cholecystectomy      SPLEEN:  Unremarkable      PANCREAS:  Unremarkable      ADRENAL GLANDS:  Unremarkable      KIDNEYS/URETERS:  No pyelonephritis or obstructive uropathy      STOMACH AND BOWEL: Scattered fluid-filled mildly distended loops of small bowel throughout the abdomen and pelvis  Postsurgical change from gastric bypass  No bowel obstruction, colitis or diverticulitis      APPENDIX:  A normal appendix was visualized      ABDOMINOPELVIC CAVITY:  No ascites or free intraperitoneal air  No lymphadenopathy      VESSELS:  No abdominal aortic aneurysm  Patent portal and splenic veins      PELVIS     REPRODUCTIVE ORGANS:  No pelvic mass or cyst      URINARY BLADDER:  Unremarkable      ABDOMINAL WALL/INGUINAL REGIONS:  Unremarkable      OSSEOUS STRUCTURES:  No acute fracture or destructive osseous lesion      IMPRESSION:     Findings suggestive of mild enteritis  No evidence of bowel obstruction, colitis or diverticulitis    Normal appendix        Labs:  Lab Results   Component Value Date    SODIUM 144 09/07/2021    K 4 2 09/07/2021     09/07/2021    CO2 30 09/07/2021    BUN 16 09/07/2021    CREATININE 0 85 09/07/2021    GLUC 83 09/07/2021    CALCIUM 9 0 09/07/2021         Lab Results   Component Value Date    WBC 6 32 09/07/2021    HGB 12 8 09/07/2021    HCT 39 2 09/07/2021    MCV 89 09/07/2021     09/07/2021         VTE Pharmacologic Prophylaxis:none  VTE Mechanical Prophylaxis: sequential compression device     Tee Mckeon PA-C

## 2021-09-07 NOTE — ANESTHESIA PREPROCEDURE EVALUATION
Procedure:  CYSTOSCOPY URETEROSCOPY WITH LITHOTRIPSY HOLMIUM LASER, RETROGRADE PYELOGRAM AND INSERTION STENT URETERAL (Left Ureter)    Relevant Problems   ANESTHESIA (within normal limits)   (-) History of anesthesia complications      CARDIO   (+) Hypertension (resolved after weight loss s/p GBP)      ENDO   (-) Diabetes mellitus, type 2 (HCC) (resolved after weight loss s/p GBP)      GI/HEPATIC   (+) GERD (gastroesophageal reflux disease) (Poorly controlled, severe reflux symptoms to the point where she cannot lie flat and vomits 3-5x weekly 2/2 GERD)      /RENAL   (+) Kidney stone on left side      HEMATOLOGY (within normal limits)      MUSCULOSKELETAL   (+) Primary osteoarthritis of both knees   (+) Primary osteoarthritis of right knee      PULMONARY   (+) Asthma   (+) COPD (chronic obstructive pulmonary disease) (HCC) (uses daily inhalers as well as prn nebulizer  Hospitalized once 6 years ago for breathing problems 2/2 pna with asthma/COPD exacerbation)   (+) Smoking   (-) URI (upper respiratory infection)        Physical Exam    Airway    Mallampati score: III  TM Distance: >3 FB  Neck ROM: full     Dental       Cardiovascular  Rhythm: regular,     Pulmonary  No wheezes,     Other Findings        Anesthesia Plan  ASA Score- 3     Anesthesia Type- general with ASA Monitors  Additional Monitors:   Airway Plan: ETT  Plan Factors-Exercise tolerance (METS): >4 METS  Chart reviewed  EKG reviewed  Existing labs reviewed  Patient is a current smoker  Patient did not smoke on day of surgery  Induction- intravenous  Postoperative Plan- Plan for postoperative opioid use  Planned trial extubation    Informed Consent- Anesthetic plan and risks discussed with patient and spouse  I personally reviewed this patient with the CRNA  Discussed and agreed on the Anesthesia Plan with the CRNA             Lab Results   Component Value Date    WBC 6 32 09/07/2021    HGB 12 8 09/07/2021    HCT 39 2 09/07/2021    MCV 89 09/07/2021     09/07/2021     Lab Results   Component Value Date    SODIUM 144 09/07/2021    K 4 2 09/07/2021     09/07/2021    CO2 30 09/07/2021    BUN 16 09/07/2021    CREATININE 0 85 09/07/2021    GLUC 83 09/07/2021    CALCIUM 9 0 09/07/2021     Lab Results   Component Value Date    ALT 24 09/07/2021    AST 17 09/07/2021    ALKPHOS 201 (H) 09/07/2021     No results found for: INR, PROTIME  Lab Results   Component Value Date    HGBA1C 5 1 04/04/2018

## 2021-09-07 NOTE — QUICK NOTE
I have personally reviewed the patient's films  Afebrile, vital signs stable  No leukocytosis  Plan cystoscopy, left ureteroscopy laser lithotripsy and left ureteral stent placement today in the operating room as an add on case  Time of case to be determined by the operating room

## 2021-09-07 NOTE — ANESTHESIA POSTPROCEDURE EVALUATION
Post-Op Assessment Note    CV Status:  Stable    Pain management: adequate     Mental Status:  Alert and awake   Hydration Status:  Euvolemic   PONV Controlled:  Controlled   Airway Patency:  Patent      Post Op Vitals Reviewed: Yes      Staff: CRNA         No complications documented      BP   122/68   Temp   98   Pulse  66   Resp   18   SpO2   98

## 2021-09-07 NOTE — OP NOTE
OPERATIVE REPORT  PATIENT NAME: Jayla Holloway    :  1965  MRN: [id number]  Pt Location: [facility] OR ROOM 04    SURGERY DATE: 2021    Surgeon(s) and Role:     * Wagner Walton MD - Primary    Preop Diagnosis:  Left ureteral stone [N20 1]    Post-Op Diagnosis Codes:     * Left ureteral stone [N20 1]    Procedure(s) (LRB):  CYSTOSCOPY URETEROSCOPY WITH LITHOTRIPSY HOLMIUM LASER, RETROGRADE PYELOGRAM AND INSERTION STENT URETERAL (Left)    Specimen(s):  * No specimens in log *    Estimated Blood Loss:   Minimal    Drains:  * No LDAs found *    Anesthesia Type:   General/LMA    Operative Indications:  Left ureteral stone [N20 1]  Left hydronephrosis with left flank pain    Operative Findings:  Radiopaque proximal ureteral stone, pushed up in kidney and broken up into tiny passable fragments  Complications:   None    Procedure and Technique:  82-year-old woman with no previous stone history is having severe left flank pain due to a 6 mm proximal left ureteral stone  This causing left flank pain and hydronephrosis  She is afebrile, no leukocytosis no sign of infection  She is currently hospitalized for this  I offered her cystoscopy left ureteroscopy laser lithotripsy and left ureteral stent placement  She is already scheduled for this on Thursday but she will not make it due to the amount of pain  I explained her the procedure as well as the risks of bleeding infection damage to ureter and possible need for additional procedures if the ureters too tight and I have to stent her alone  She gives informed consent  Patient was brought to the operating room and identified properly  LMA was induced the patient was prepped and draped dorsal lithotomy position usual fashion  A time-out was performed  Cystoscopy was carried out with a 22 [place] cystoscopy sheath and 30 degree lens  The urethra was normal without stricture  The bladder was smooth not trabeculated there are no stones tumors or other lesions  The orifices were orthotopic intact  I placed a 0 035 in guidewire up the left ureteral orifice past the stone  I then placed a dual-lumen catheter to establish 2 wire access, and established 1 wire as a safety wire and clamped this aside and then over the working wire placed a 35 cm 10/12 ureteral access sheath  I then took the flexible ureteral scope and performed flexible ureteroscopy and found the stone, and using the 272 holmium laser fiber broke the stone up into tiny passable fragments after pushing it up into a midpole calyx  The stone was radiopaque and appeared to be calcium oxalate  I then placed a 6 [place] by 26 cm stent over the wire after I removed the scope and coils were established in the renal pelvis and bladder  The stent string was externalized and taped to the lower abdomen the bladder was drained with the cystoscopy sheath     I was present for the entire procedure and A qualified resident physician was not available    Patient Disposition:  PACU  and extubated and stable    SIGNATURE: Alexia Rae MD  DATE: September 7, 2021  TIME: 2:17 PM

## 2021-09-07 NOTE — INTERVAL H&P NOTE
H&P reviewed  After examining the patient I find no changes in the patients condition since the H&P had been written      Vitals:    09/07/21 1324   BP: 146/76   Pulse: 75   Resp: 20   Temp: 97 6 °F (36 4 °C)   SpO2: 98%

## 2021-09-07 NOTE — H&P (VIEW-ONLY)
Consults: UROLOGY  Tawni Mohs 49 y o  female [id number]   Unit/Bed #: ED 19  Encounter: [id number]        Assessment  & Plan  :    Ureterolithiasis:  -noted on CT scan revealing a 6 mm obstructing stone in the proximal left ureter with associated mild left hydroureteronephrosis proximal to this  Left-sided perinephric fat stranding is noted  -will plan for cystoscopy ureteroscopy holmium laser and left ureteral stent placement, possible today   -patient to remain NPO, sips with meds   -discussed procedure in depth with patient, discussed risk factors including bleeding, infection, need for additional stone procedures and damage to nearby structures  Patient understands that she may be going home with stent with a string and stent without a string   -consent to be obtained at time of procedure  -UA negative for leukocytes, nitrates, blood   -creatinine 0 85, baseline 0 61  -no leukocytosis  -hemoglobin 14 0  -patient afebrile    Subjective :    Tawni Mohs  is a 49 y o  female who was admitted for 6 mm obstructing stone with uncontrolled flank pain  Patient was originally seen at [facility] for she had a CT scan performed which revealed mild left hydronephrosis with prominence of left renal pelvis secondary to 7-8 mm calculus at the left ureteropelvic junction compatible with obstructive uropathy  Patient had followed up with [facility] urology for evaluation  Patient continued to be to experience severe flank pain intermittently  Patient currently in the process of being fast tracked for kidney stones  Scheduled on 09/09/2021 with Dr Jose Enrique Sauer  Patient presented to [facility] due to continued severe flank pain despite medications  She is currently reporting that her pain is located on the left flank radiating down to the left abdomen is suprapubic area  She reports nausea  Denies any vomiting,  fevers, chills  She denies any prior kidney stones    She denies any prior urologic procedures  She does report that she had gastric bypass procedure and has lot of abdominal pain issues along with fibromyalgia  Denies any adverse reactions to anesthesia in the past   Patient does has significant history of COPD  No Known Allergies   Current Outpatient Medications   Medication Instructions    acetaminophen (TYLENOL) 650 mg CR tablet 1 tablet, Oral, Every 8 hours    albuterol (PROVENTIL HFA,VENTOLIN HFA) 90 mcg/act inhaler 2 puffs, Inhalation, Every 6 hours PRN    albuterol 2 5 mg, Nebulization, 4 times daily    ascorbic acid (RA VITAMIN C) 500 mg, Oral, Daily    budesonide-formoterol (SYMBICORT) 160-4 5 mcg/act inhaler 2 puffs, Inhalation, 2 times daily    celecoxib (CeleBREX) 100 mg capsule take 1 capsule by mouth twice a day    ciprofloxacin (CIPRO) 500 mg, Oral, Every 12 hours scheduled    cyclobenzaprine (FLEXERIL) 10 mg, Oral, Daily at bedtime    dicyclomine (BENTYL) 20 mg, Oral, Daily    divalproex sodium (DEPAKOTE ER) 250 mg, Oral, Daily    DULoxetine (CYMBALTA) 30 mg, Oral, 2 times daily    ergocalciferol (VITAMIN D2) 50,000 Units, Weekly    ferrous sulfate 324 mg, Oral, 2 times daily before meals    folic acid (RA FOLIC ACID) 393 mcg, Oral, Daily    gabapentin (NEURONTIN) 600 mg, Oral, 2 times daily    HYDROcodone-acetaminophen (NORCO) 5-325 mg per tablet 1 tablet, Oral, Every 6 hours PRN    ipratropium (ATROVENT) 0 25 mg, Inhalation, Every 6 hours PRN    magnesium oxide (MAG-OX) 400 mg, Oral, 2 times daily    mometasone (ASMANEX TWISTHALER) 220 mcg, Inhalation, Daily    Multiple Vitamins-Minerals (Tab-A-Charles) TABS 1 tablet, Oral, Daily    NON FORMULARY No dose, route, or frequency recorded      ondansetron (ZOFRAN-ODT) 4 mg, Oral, As needed    oxyCODONE-acetaminophen (PERCOCET) 5-325 mg per tablet 1 tablet, Oral, Every 6 hours PRN    pantoprazole (PROTONIX) 40 mg    RA FOLIC ACID 650 MCG tablet take 1 tablet by mouth once daily    tamsulosin (FLOMAX) 0 4 mg, Oral, Daily with dinner    tiotropium (SPIRIVA RESPIMAT) 2 5 MCG/ACT AERS inhaler Daily    tiotropium-olodaterol (STIOLTO RESPIMAT) 2 5-2 5 MCG/ACT inhaler 2 puffs, Daily    traZODone (DESYREL) 150 mg, Daily at bedtime      Past Medical History:   Diagnosis Date    Arthritis     Asthma     COPD (chronic obstructive pulmonary disease) (RUST 75 )     Coronary artery disease     Depression     Diabetes mellitus (RUST 75 )     with weight loss no longer has high BS    Diverticulitis     Fibromyalgia     Fibromyalgia, primary     GERD (gastroesophageal reflux disease)     Hypertension     lost weight and no longer has    Migraine     Pancreatitis     Stomach problems      Past Surgical History:   Procedure Laterality Date    ABDOMINAL SURGERY      CHOLECYSTECTOMY      GALLBLADDER SURGERY      GASTRIC BYPASS      PANNICULECTOMY  2017    [place] WRIST Sheree Gamma LIG Right 3/3/2020    Procedure: RELEASE CARPAL TUNNEL ENDOSCOPIC Right;  Surgeon: Lucina Morrow MD;  Location: [facility];  Service: Orthopedics    [place] WRIST Sheree Gamma LIG Left 3/18/2020    Procedure: RELEASE LEFT CARPAL TUNNEL ENDOSCOPIC;  Surgeon: Lucina Morrow MD;  Location: [facility];  Service: Orthopedics    STOMACH SURGERY      tummy tuck    TUBAL LIGATION       Family History   Problem Relation Age of Onset    Rheum arthritis Mother     Hypertension Mother     COPD Mother     Diabetes Father      Social History     Socioeconomic History    Marital status: /Civil Union     Spouse name: None    Number of children: None    Years of education: None    Highest education level: None   Occupational History    None   Tobacco Use    Smoking status: Current Every Day Smoker     Packs/day: 0 50     Years: 25 00     Pack years: 12 50     Types: Cigarettes    Smokeless tobacco: Never Used   Vaping Use    Vaping Use: Never used   Substance and Sexual Activity    Alcohol use: No    Drug use: No    Sexual activity: None   Other Topics Concern    None   Social History Narrative    None     Social Determinants of Health     Financial Resource Strain:     Difficulty of Paying Living Expenses:    Food Insecurity:     Worried About Running Out of Food in the Last Year:     [address] in the Last Year:    Transportation Needs:     Lack of Transportation (Medical):  Lack of Transportation (Non-Medical):    Physical Activity:     Days of Exercise per Week:     Minutes of Exercise per Session:    Stress:     Feeling of Stress :    Social Connections:     Frequency of Communication with Friends and Family:     Frequency of Social Gatherings with Friends and Family:     Attends [employer] Services:     Active Member of Clubs or Organizations:     Attends Club or Organization Meetings:     Marital Status:    Intimate Partner Violence:     Fear of Current or Ex-Partner:     Emotionally Abused:     Physically Abused:     Sexually Abused:         Review of Systems   Constitutional: Negative  Negative for chills and fever  HENT: Negative  Eyes: Negative  Respiratory: Negative  Cardiovascular: Negative  Gastrointestinal: Positive for abdominal pain and nausea  Negative for diarrhea and vomiting  Endocrine: Negative  Genitourinary: Positive for flank pain and pelvic pain  Negative for difficulty urinating, dysuria and hematuria  Skin: Negative  Allergic/Immunologic: Negative  Neurological: Negative  Hematological: Negative  Psychiatric/Behavioral: Negative  Objective     Physical Exam  Constitutional:       General: She is not in acute distress  Appearance: She is normal weight  She is not ill-appearing, toxic-appearing or diaphoretic  HENT:      Head: Normocephalic and atraumatic  Right Ear: External ear normal       Left Ear: External ear normal       Nose: Nose normal       Mouth/Throat:      Pharynx: Oropharynx is clear     Eyes: General: No scleral icterus  Conjunctiva/sclera: Conjunctivae normal    Cardiovascular:      Rate and Rhythm: Normal rate and regular rhythm  Pulses: Normal pulses  Heart sounds: Normal heart sounds  No murmur heard  No friction rub  No gallop  Pulmonary:      Effort: Pulmonary effort is normal  No respiratory distress  Breath sounds: Wheezing present  No rhonchi or rales  Comments: Wheezes heard throughout all lobes  Abdominal:      General: Bowel sounds are normal  There is no distension  Palpations: Abdomen is soft  Tenderness: There is abdominal tenderness  There is left CVA tenderness  There is no right CVA tenderness, guarding or rebound  Comments: Tenderness in left lower quadrant suprapubic area, left CVA tenderness   Musculoskeletal:         General: Normal range of motion  Cervical back: Normal range of motion  Skin:     General: Skin is warm and dry  Neurological:      General: No focal deficit present  Mental Status: She is alert and oriented to person, place, and time  Psychiatric:         Mood and Affect: Mood normal          Behavior: Behavior normal          Thought Content: Thought content normal          Judgment: Judgment normal                 Imaging:  CT ABDOMEN AND PELVIS WITH IV CONTRAST     INDICATION:   Periumbilical pain      COMPARISON:  None      TECHNIQUE:  CT examination of the abdomen and pelvis was performed  Axial, sagittal, and coronal 2D reformatted images were created from the source data and submitted for interpretation      Radiation dose length product (DLP) for this visit:  469 mGy-cm     This examination, like all CT scans performed in the [facility], was performed utilizing techniques to minimize radiation dose exposure, including the use of iterative   reconstruction and automated exposure control      IV Contrast:  100 mL of iohexol (OMNIPAQUE)  Enteric Contrast:  Enteric contrast was not administered      FINDINGS:     ABDOMEN     LOWER CHEST:  Clear lung bases      LIVER/BILIARY TREE:  Mild distention of the proximal intrahepatic bile ducts and the common bile duct, likely postsurgical   No evidence of biliary obstruction or choledocholithiasis      GALLBLADDER:  Cholecystectomy      SPLEEN:  Unremarkable      PANCREAS:  Unremarkable      ADRENAL GLANDS:  Unremarkable      KIDNEYS/URETERS:  No pyelonephritis or obstructive uropathy      STOMACH AND BOWEL: Scattered fluid-filled mildly distended loops of small bowel throughout the abdomen and pelvis  Postsurgical change from gastric bypass  No bowel obstruction, colitis or diverticulitis      APPENDIX:  A normal appendix was visualized      ABDOMINOPELVIC CAVITY:  No ascites or free intraperitoneal air  No lymphadenopathy      VESSELS:  No abdominal aortic aneurysm  Patent portal and splenic veins      PELVIS     REPRODUCTIVE ORGANS:  No pelvic mass or cyst      URINARY BLADDER:  Unremarkable      ABDOMINAL WALL/INGUINAL REGIONS:  Unremarkable      OSSEOUS STRUCTURES:  No acute fracture or destructive osseous lesion      IMPRESSION:     Findings suggestive of mild enteritis  No evidence of bowel obstruction, colitis or diverticulitis    Normal appendix        Labs:  Lab Results   Component Value Date    SODIUM 144 09/07/2021    K 4 2 09/07/2021     09/07/2021    CO2 30 09/07/2021    BUN 16 09/07/2021    CREATININE 0 85 09/07/2021    GLUC 83 09/07/2021    CALCIUM 9 0 09/07/2021         Lab Results   Component Value Date    WBC 6 32 09/07/2021    HGB 12 8 09/07/2021    HCT 39 2 09/07/2021    MCV 89 09/07/2021     09/07/2021         VTE Pharmacologic Prophylaxis:none  VTE Mechanical Prophylaxis: sequential compression device     Mirnice Bryce PA-C

## 2021-09-09 ENCOUNTER — TELEPHONE (OUTPATIENT)
Dept: UROLOGY | Facility: MEDICAL CENTER | Age: 56
End: 2021-09-09

## 2021-09-09 ENCOUNTER — HOSPITAL ENCOUNTER (OUTPATIENT)
Dept: ULTRASOUND IMAGING | Facility: CLINIC | Age: 56
Discharge: HOME | End: 2021-09-09
Payer: COMMERCIAL

## 2021-09-09 ENCOUNTER — APPOINTMENT (OUTPATIENT)
Dept: RADIOLOGY | Facility: CLINIC | Age: 56
End: 2021-09-09
Payer: COMMERCIAL

## 2021-09-09 DIAGNOSIS — N20.0 NEPHROLITHIASIS: ICD-10-CM

## 2021-09-09 DIAGNOSIS — N20.0 NEPHROLITHIASIS: Primary | ICD-10-CM

## 2021-09-09 PROCEDURE — 76770 US EXAM ABDO BACK WALL COMP: CPT

## 2021-09-09 PROCEDURE — 74018 RADEX ABDOMEN 1 VIEW: CPT

## 2021-09-09 RX ORDER — TAMSULOSIN HYDROCHLORIDE 0.4 MG/1
0.4 CAPSULE ORAL
Qty: 30 CAPSULE | Refills: 0 | Status: SHIPPED | OUTPATIENT
Start: 2021-09-09 | End: 2021-09-23 | Stop reason: SDUPTHER

## 2021-09-09 NOTE — TELEPHONE ENCOUNTER
Called and spoke to Myahkarina Fritz  Thanked her for getting imaging done  Made her aware of recommendation for [facility] BL[facility]  Reviewed tamsulosin expectations and side effects  Made aware of aggressively hydrate as well as er precautions  Patient aware to callback with any updates or questions  Patient verbalized understanding

## 2021-09-09 NOTE — TELEPHONE ENCOUNTER
Patient is calling because she stated she got STAT labs, got results and wants to know what to do next  Please advise

## 2021-09-09 NOTE — TELEPHONE ENCOUNTER
Called and spoke to Fidel  She reports terrible pain and sounds uncomfortable over the phone  Advised recommendations from [employer]  Made aware of US and KUB orders as stat  Provided scheduling number  Advised she utilities medications discharged with, as well as tylenol/motrin  Advised proper hydration  Also advised for severe uncontrolled pain to seek treatment in the ER  Made Fidel aware we will call once we have the US and KUB results with next recommendation  Patient verbalized understanding

## 2021-09-09 NOTE — TELEPHONE ENCOUNTER
Recommend patient go for follow up KUB and [address] now  Orders entered  She is likely experiencing stent colic due to removal   Patient may take mediations as previously prescribed at discharge on 9/7  However, if pain is severe and intolerable would recommend going to ER

## 2021-09-09 NOTE — TELEPHONE ENCOUNTER
pts care is managed [facility] office  Pt was last seen 9/7/21  Pt states while she is crying she is in more pain now than before the surgery  Pt is having severe left back pain and severe pelvic pain  Pt reports her pain is beyond # 10  Stating " I cant take this pain"  Pt stating while cleaning herself yesterday her ring got caught on her stent and she accidentally pulled it out  Pt reports she is unable to sleep or eat regardless of the pain medication  The pain medication is without effect  Pt is taking the oxybutynin and pyridium  Pt states she is well hydrated  Pt was advised the above symptoms are sometimes common after surgery  Pt crying asking for relief  Advised pt provider will be contacted with her concerns and a nurse will call her back asap  Please advise   Thank

## 2021-09-10 ENCOUNTER — TELEPHONE (OUTPATIENT)
Dept: SURGERY | Facility: CLINIC | Age: 56
End: 2021-09-10

## 2021-09-10 DIAGNOSIS — N20.1 LEFT URETERAL STONE: ICD-10-CM

## 2021-09-10 DIAGNOSIS — R10.9 LEFT FLANK PAIN: ICD-10-CM

## 2021-09-10 DIAGNOSIS — N13.30 HYDRONEPHROSIS OF LEFT KIDNEY: ICD-10-CM

## 2021-09-10 RX ORDER — HYDROCODONE BITARTRATE AND ACETAMINOPHEN 5; 325 MG/1; MG/1
1 TABLET ORAL EVERY 4 HOURS PRN
Qty: 20 TABLET | Refills: 0 | Status: SHIPPED | OUTPATIENT
Start: 2021-09-10 | End: 2021-09-20

## 2021-09-10 NOTE — TELEPHONE ENCOUNTER
Called pt to inform  Her about completing the [address] before net appt, pt stated she had it dine yesterday 9/9   Results in Epic, confirmed 9/15 appt at 11 am

## 2021-09-14 ENCOUNTER — TELEPHONE (OUTPATIENT)
Dept: UROLOGY | Facility: CLINIC | Age: 56
End: 2021-09-14

## 2021-09-14 NOTE — TELEPHONE ENCOUNTER
Appointment canceled for Dwayne Juares ([id number])  Visit Type: PROCEDURE LONG PG  Date        Time      Length    Provider                  Department  9/15/2021   11:00 AM  30 mins  [facility] [place]     Reason for Cancellation: Patient     Patient Comments: This appointment is to remove the Stint which I explained I accidentally pulled out already

## 2021-09-16 ENCOUNTER — HOSPITAL ENCOUNTER (OUTPATIENT)
Dept: MRI IMAGING | Facility: CLINIC | Age: 56
Discharge: HOME | End: 2021-09-16
Payer: COMMERCIAL

## 2021-09-16 DIAGNOSIS — G43.719 CHRONIC MIGRAINE WITHOUT AURA, INTRACTABLE, WITHOUT STATUS MIGRAINOSUS: ICD-10-CM

## 2021-09-16 PROCEDURE — 70551 MRI BRAIN STEM W/O DYE: CPT

## 2021-09-16 PROCEDURE — G1004 CDSM NDSC: HCPCS

## 2021-09-17 NOTE — TELEPHONE ENCOUNTER
Patient called stating she got a letter in the mail in regards to the U/S and she wants to know if there is anything she should be concerned about because the letter states to call her provider to follow up   Patient still having some discomfort  Having back pain on left side and front area  She would like to know how to proceed

## 2021-09-23 ENCOUNTER — NURSE TRIAGE (OUTPATIENT)
Dept: OTHER | Facility: OTHER | Age: 56
End: 2021-09-23

## 2021-09-23 DIAGNOSIS — N20.0 NEPHROLITHIASIS: Primary | ICD-10-CM

## 2021-09-23 DIAGNOSIS — N20.0 NEPHROLITHIASIS: ICD-10-CM

## 2021-09-23 RX ORDER — TAMSULOSIN HYDROCHLORIDE 0.4 MG/1
0.4 CAPSULE ORAL
Qty: 30 CAPSULE | Refills: 0 | Status: SHIPPED | OUTPATIENT
Start: 2021-09-23 | End: 2022-08-01

## 2021-09-23 NOTE — TELEPHONE ENCOUNTER
Provider pool: Dr Vesta Lock patient  Cystoscopy Ureteroscopy with lithotripsy laser, retrograde pyelography left on 9/7/2021  Having intermittent episodes of severe pain (see triage)  She has run out of all medications prescribed for the discomfort  Patient is wondering if she should expect to be in pain this long after the procedure  Please advise

## 2021-09-23 NOTE — TELEPHONE ENCOUNTER
Most recent ultrasound that was completed postoperatively did show a possible small left distal ureteral stone which was thought to be a stone fragment from surgery  If she is continuing to have symptoms of this far out from surgery I would recommend getting a repeat CT  I have placed an order  Additionally she should be drinking 2-3 L of water per day    I sent in a refill for Flomax

## 2021-09-23 NOTE — TELEPHONE ENCOUNTER
Reason for Disposition   MODERATE pain (e g , interferes with normal activities or awakens from sleep)    Answer Assessment - Initial Assessment Questions  1  LOCATION: "Where does it hurt?" (e g , left, right)      Left kidney and radiating to the front of lower abdomen  2  ONSET: "When did the pain start?"      8/13/2021  Had lithotripsy on 9/7/2021  Intermittent pain since the procedure  Yesterday it became worse  3  SEVERITY: "How bad is the pain?" (e g , Scale 1-10; mild, moderate, or severe)    - MILD (1-3): doesn't interfere with normal activities     - MODERATE (4-7): interferes with normal activities or awakens from sleep     - SEVERE (8-10): excruciating pain and patient unable to do normal activities (stays in bed)        At the worst 10/10  5-6/10 now  When severe pain occurs, it lasts 1-2 hours and then subsides  4  PATTERN: "Does the pain come and go, or is it constant?"       Intermittent  5  CAUSE: "What do you think is causing the pain?"      Kidney stones  6  OTHER SYMPTOMS:  "Do you have any other symptoms?" (e g , fever, abdominal pain, vomiting, leg weakness, burning with urination, blood in urine)     Denied hematuria or fever  7  PREGNANCY:  "Is there any chance you are pregnant?" "When was your last menstrual period?"      N/A    Ran out of Flomax, Ditropan, Narco, pyridium and Keflex  Patient doesn't think she passed the kidney stones  Protocols used:  FLANK PAIN-ADULT-[place]

## 2021-09-23 NOTE — TELEPHONE ENCOUNTER
Called and spoke to patient  Informed patient of Bernice Ashford PA-C's message below  Patient verbalized understanding

## 2021-09-23 NOTE — TELEPHONE ENCOUNTER
Regarding: Kidney Stones  ----- Message from [facility] sent at 9/23/2021  9:21 AM EDT -----  "I had my kidneys stone's blasted on 9/7  I am still in a lot of pain on my left side that goes from my lower back to front    Please tell me what I can do "

## 2021-10-06 DIAGNOSIS — G43.719 CHRONIC MIGRAINE WITHOUT AURA, INTRACTABLE, WITHOUT STATUS MIGRAINOSUS: Primary | ICD-10-CM

## 2021-10-08 DIAGNOSIS — G43.719 INTRACTABLE CHRONIC MIGRAINE WITHOUT AURA AND WITHOUT STATUS MIGRAINOSUS: Primary | ICD-10-CM

## 2021-10-08 RX ORDER — FREMANEZUMAB-VFRM 225 MG/1.5ML
225 INJECTION SUBCUTANEOUS
Qty: 1.5 ML | Refills: 5 | Status: SHIPPED | OUTPATIENT
Start: 2021-10-08 | End: 2022-06-15

## 2021-10-11 ENCOUNTER — TELEPHONE (OUTPATIENT)
Dept: NEUROLOGY | Facility: CLINIC | Age: 56
End: 2021-10-11

## 2021-10-13 ENCOUNTER — TELEPHONE (OUTPATIENT)
Dept: NEUROLOGY | Facility: CLINIC | Age: 56
End: 2021-10-13

## 2021-10-13 DIAGNOSIS — G43.019 INTRACTABLE MIGRAINE WITHOUT AURA AND WITHOUT STATUS MIGRAINOSUS: Primary | ICD-10-CM

## 2021-10-13 RX ORDER — GALCANEZUMAB 120 MG/ML
INJECTION, SOLUTION SUBCUTANEOUS
Qty: 1 ML | Refills: 5 | Status: SHIPPED | OUTPATIENT
Start: 2021-10-13 | End: 2021-10-18 | Stop reason: SDUPTHER

## 2021-10-18 DIAGNOSIS — G43.019 INTRACTABLE MIGRAINE WITHOUT AURA AND WITHOUT STATUS MIGRAINOSUS: Primary | ICD-10-CM

## 2021-10-18 RX ORDER — GALCANEZUMAB 120 MG/ML
INJECTION, SOLUTION SUBCUTANEOUS
Qty: 1 ML | Refills: 5 | Status: SHIPPED | OUTPATIENT
Start: 2021-10-18 | End: 2021-10-19

## 2021-11-01 ENCOUNTER — APPOINTMENT (OUTPATIENT)
Dept: RADIOLOGY | Facility: CLINIC | Age: 56
End: 2021-11-01
Payer: COMMERCIAL

## 2021-11-01 ENCOUNTER — OFFICE VISIT (OUTPATIENT)
Dept: OBGYN CLINIC | Facility: CLINIC | Age: 56
End: 2021-11-01
Payer: COMMERCIAL

## 2021-11-01 VITALS
WEIGHT: 164.4 LBS | HEIGHT: 62 IN | DIASTOLIC BLOOD PRESSURE: 76 MMHG | SYSTOLIC BLOOD PRESSURE: 119 MMHG | BODY MASS INDEX: 30.25 KG/M2 | HEART RATE: 75 BPM

## 2021-11-01 DIAGNOSIS — M65.341 TRIGGER FINGER, RIGHT RING FINGER: ICD-10-CM

## 2021-11-01 DIAGNOSIS — M79.644 PAIN OF FINGER OF RIGHT HAND: ICD-10-CM

## 2021-11-01 DIAGNOSIS — M65.321 TRIGGER FINGER, RIGHT INDEX FINGER: ICD-10-CM

## 2021-11-01 DIAGNOSIS — M65.331 TRIGGER FINGER, RIGHT MIDDLE FINGER: Primary | ICD-10-CM

## 2021-11-01 PROCEDURE — 73130 X-RAY EXAM OF HAND: CPT

## 2021-11-01 PROCEDURE — 99213 OFFICE O/P EST LOW 20 MIN: CPT | Performed by: ORTHOPAEDIC SURGERY

## 2021-11-01 PROCEDURE — 20550 NJX 1 TENDON SHEATH/LIGAMENT: CPT | Performed by: ORTHOPAEDIC SURGERY

## 2021-11-01 RX ORDER — LIDOCAINE HYDROCHLORIDE 10 MG/ML
0.5 INJECTION, SOLUTION INFILTRATION; PERINEURAL
Status: COMPLETED | OUTPATIENT
Start: 2021-11-01 | End: 2021-11-01

## 2021-11-01 RX ORDER — BUPIVACAINE HYDROCHLORIDE 2.5 MG/ML
1 INJECTION, SOLUTION EPIDURAL; INFILTRATION; INTRACAUDAL
Status: COMPLETED | OUTPATIENT
Start: 2021-11-01 | End: 2021-11-01

## 2021-11-01 RX ORDER — METHYLPREDNISOLONE ACETATE 40 MG/ML
1 INJECTION, SUSPENSION INTRA-ARTICULAR; INTRALESIONAL; INTRAMUSCULAR; SOFT TISSUE
Status: COMPLETED | OUTPATIENT
Start: 2021-11-01 | End: 2021-11-01

## 2021-11-01 RX ADMIN — BUPIVACAINE HYDROCHLORIDE 1 ML: 2.5 INJECTION, SOLUTION EPIDURAL; INFILTRATION; INTRACAUDAL at 15:41

## 2021-11-01 RX ADMIN — METHYLPREDNISOLONE ACETATE 1 ML: 40 INJECTION, SUSPENSION INTRA-ARTICULAR; INTRALESIONAL; INTRAMUSCULAR; SOFT TISSUE at 15:41

## 2021-11-01 RX ADMIN — LIDOCAINE HYDROCHLORIDE 0.5 ML: 10 INJECTION, SOLUTION INFILTRATION; PERINEURAL at 15:41

## 2021-12-16 ENCOUNTER — OFFICE VISIT (OUTPATIENT)
Dept: OBGYN CLINIC | Facility: CLINIC | Age: 56
End: 2021-12-16
Payer: COMMERCIAL

## 2021-12-16 VITALS
DIASTOLIC BLOOD PRESSURE: 66 MMHG | HEIGHT: 62 IN | WEIGHT: 166 LBS | HEART RATE: 79 BPM | BODY MASS INDEX: 30.55 KG/M2 | SYSTOLIC BLOOD PRESSURE: 99 MMHG

## 2021-12-16 DIAGNOSIS — M65.331 TRIGGER FINGER, RIGHT MIDDLE FINGER: Primary | ICD-10-CM

## 2021-12-16 DIAGNOSIS — M65.341 TRIGGER FINGER, RIGHT RING FINGER: ICD-10-CM

## 2021-12-16 DIAGNOSIS — M65.311 TRIGGER FINGER OF RIGHT THUMB: ICD-10-CM

## 2021-12-16 DIAGNOSIS — M65.321 TRIGGER FINGER, RIGHT INDEX FINGER: ICD-10-CM

## 2021-12-16 PROCEDURE — 99213 OFFICE O/P EST LOW 20 MIN: CPT | Performed by: ORTHOPAEDIC SURGERY

## 2021-12-16 PROCEDURE — 20550 NJX 1 TENDON SHEATH/LIGAMENT: CPT | Performed by: ORTHOPAEDIC SURGERY

## 2021-12-16 RX ORDER — LIDOCAINE HYDROCHLORIDE 10 MG/ML
0.5 INJECTION, SOLUTION INFILTRATION; PERINEURAL
Status: COMPLETED | OUTPATIENT
Start: 2021-12-16 | End: 2021-12-16

## 2021-12-16 RX ORDER — METHYLPREDNISOLONE ACETATE 40 MG/ML
0.5 INJECTION, SUSPENSION INTRA-ARTICULAR; INTRALESIONAL; INTRAMUSCULAR; SOFT TISSUE
Status: COMPLETED | OUTPATIENT
Start: 2021-12-16 | End: 2021-12-16

## 2021-12-16 RX ADMIN — METHYLPREDNISOLONE ACETATE 0.5 ML: 40 INJECTION, SUSPENSION INTRA-ARTICULAR; INTRALESIONAL; INTRAMUSCULAR; SOFT TISSUE at 16:35

## 2021-12-16 RX ADMIN — LIDOCAINE HYDROCHLORIDE 0.5 ML: 10 INJECTION, SOLUTION INFILTRATION; PERINEURAL at 16:35

## 2021-12-16 RX ADMIN — LIDOCAINE HYDROCHLORIDE 0.5 ML: 10 INJECTION, SOLUTION INFILTRATION; PERINEURAL at 10:52

## 2021-12-16 RX ADMIN — METHYLPREDNISOLONE ACETATE 0.5 ML: 40 INJECTION, SUSPENSION INTRA-ARTICULAR; INTRALESIONAL; INTRAMUSCULAR; SOFT TISSUE at 10:52

## 2022-01-17 ENCOUNTER — TELEPHONE (OUTPATIENT)
Dept: NEUROLOGY | Facility: CLINIC | Age: 57
End: 2022-01-17

## 2022-01-19 ENCOUNTER — OFFICE VISIT (OUTPATIENT)
Dept: NEUROLOGY | Facility: CLINIC | Age: 57
End: 2022-01-19
Payer: COMMERCIAL

## 2022-01-19 VITALS
SYSTOLIC BLOOD PRESSURE: 146 MMHG | HEIGHT: 62 IN | TEMPERATURE: 96.4 F | DIASTOLIC BLOOD PRESSURE: 70 MMHG | HEART RATE: 69 BPM | BODY MASS INDEX: 28.71 KG/M2 | WEIGHT: 156 LBS

## 2022-01-19 DIAGNOSIS — I10 HYPERTENSION, UNSPECIFIED TYPE: ICD-10-CM

## 2022-01-19 DIAGNOSIS — E11.9 TYPE 2 DIABETES MELLITUS WITHOUT COMPLICATION, WITHOUT LONG-TERM CURRENT USE OF INSULIN (HCC): ICD-10-CM

## 2022-01-19 DIAGNOSIS — G43.709 CHRONIC MIGRAINE WITHOUT AURA WITHOUT STATUS MIGRAINOSUS, NOT INTRACTABLE: ICD-10-CM

## 2022-01-19 PROBLEM — M54.81 BILATERAL OCCIPITAL NEURALGIA: Status: RESOLVED | Noted: 2021-08-25 | Resolved: 2022-01-19

## 2022-01-19 PROBLEM — M54.81 OCCIPITAL NEURALGIA OF LEFT SIDE: Status: RESOLVED | Noted: 2022-01-19 | Resolved: 2022-01-19

## 2022-01-19 PROBLEM — M54.81 OCCIPITAL NEURALGIA OF LEFT SIDE: Status: ACTIVE | Noted: 2022-01-19

## 2022-01-19 PROCEDURE — 99214 OFFICE O/P EST MOD 30 MIN: CPT | Performed by: PSYCHIATRY & NEUROLOGY

## 2022-01-19 RX ORDER — SUMATRIPTAN 100 MG/1
TABLET, FILM COATED ORAL
Qty: 9 TABLET | Refills: 5 | Status: SHIPPED | OUTPATIENT
Start: 2022-01-19 | End: 2022-07-19 | Stop reason: SDUPTHER

## 2022-01-19 NOTE — PROGRESS NOTES
Jameson Wahl is a 49 y o  female  Chief Complaint   Patient presents with    Chronic migraine without aura       Assessment:  1  Chronic migraine without aura without status migrainosus, not intractable    2  Hypertension, unspecified type    3  Type 2 diabetes mellitus without complication, without long-term current use of insulin ([facility])        Plan:  Referred to Dr Maycol Nolan for Botox  Continue with magnesium patient was advised to take riboflavin she is already on gabapentin 600 mg twice a day,  Follow-up in 6 months    Discussion:  Patient has failed multiple medications she has history of occipital neuralgia with transformed migraine and analgesic rebound headache with chronic daily headaches, she has history of a kidney stone and hence could not use Topamax and her Topamax was discontinued she did not tolerate Depakote she is already on gabapentin 600 mg twice a day and is on Emgality, she was advised to continue with same she is on magnesium 400 mg a day I advised her to take riboflavin 200-400 mg a day side effects discussed with her also given a prescription for Imitrex she has used it in the past p r n  for headache side effects discussed with her she denies having any cardiac issues  She did get some relief with Botox in the past I am going to refer her to see my associate Dr Maycol Nolan for possible Botox, she was advised to avoid excessive use of over-the-counter analgesics also was advised to quit smoking to keep herself well hydrated the keep her blood pressure cholesterol and sugar under control to go to the hospital if has any worsening symptoms and call me otherwise to see me back in 6 months and follow up with other physicians      Subjective:    HPI   Patient is here in follow-up for her chronic migraine headaches, she has past medical history significant for hypertension diabetes under control with since she had the bariatric surgery in March of 2017 chronic pain syndrome/fibromyalgia or myofascial pain she has in follow-up with [facility] and also has history of chronic migraine headaches she has had occipital neuralgia in the past with occipital nerve blocks and also has had Botox she smokes 2 packs per day and has history of COPD she was initially on Topamax which was discontinued since she had a kidney stone she is on gabapentin 600 mg twice a day and magnesium oxide 400 mg twice a day also she did not tolerate her Depakote  She continues to have daily headaches mostly in the parietal head region on the left side associated with photophobia phonophobia about 6-7 on 10 last for a few hours to few days sometimes it is associated with visual aura but most of the time not occasionally she might have some nausea vomiting she has been taking Tylenol on a regular basis for headaches 3 to 4 times a week she does not drink caffeine she smokes 2 packs per day, denies any motor or sensory symptoms in upper or lower extremities no other complaints      Vitals:    01/19/22 1336   BP: 146/70   BP Location: Left arm   Patient Position: Sitting   Cuff Size: Adult   Pulse: 69   Temp: (!) 96 4 °F (35 8 °C)   TempSrc: Skin   Weight: 70 8 kg (156 lb)   Height: 5' 2" (1 575 m)       Current Medications    Current Outpatient Medications:     acetaminophen (TYLENOL) 650 mg CR tablet, Take 1 tablet by mouth every 8 (eight) hours, Disp: , Rfl:     albuterol (2 5 mg/3 mL) 0 083 % nebulizer solution, Take 2 5 mg by nebulization 4 (four) times a day, Disp: , Rfl:     albuterol (PROVENTIL HFA,VENTOLIN HFA) 90 mcg/act inhaler, Inhale 2 puffs every 6 (six) hours as needed for wheezing, Disp: , Rfl:     budesonide-formoterol (SYMBICORT) 160-4 5 mcg/act inhaler, Inhale 2 puffs 2 (two) times a day, Disp: , Rfl:     celecoxib (CeleBREX) 100 mg capsule, take 1 capsule by mouth twice a day, Disp: 60 capsule, Rfl: 3    dicyclomine (BENTYL) 20 mg tablet, Take 20 mg by mouth daily, Disp: , Rfl:     DULoxetine (CYMBALTA) 30 mg delayed release capsule, Take 30 mg by mouth 2 (two) times a day , Disp: , Rfl:     ferrous sulfate 324 (65 Fe) mg, Take 1 tablet (324 mg total) by mouth 2 (two) times a day before meals (Patient taking differently: Take 324 mg by mouth daily before breakfast ), Disp: 60 tablet, Rfl: 0    fremanezumab-vfrm (Ajovy) 225 MG/1 5ML prefilled syringe, Inject 1 5 mL (225 mg total) under the skin every 30 (thirty) days, Disp: 1 5 mL, Rfl: 5    gabapentin (NEURONTIN) 600 MG tablet, Take 600 mg by mouth 2 (two) times a day , Disp: , Rfl:     Galcanezumab-gnlm 120 MG/ML SOAJ, 1st month to take 240 mg subcutaneously and after that 120 mg subcutaneously once a month, Disp: 1 mL, Rfl: 11    ipratropium (ATROVENT) 0 02 % nebulizer solution, Inhale 0 25 mg every 6 (six) hours as needed, Disp: , Rfl:     magnesium oxide (MAG-OX) 400 mg, Take 400 mg by mouth 2 (two) times a day, Disp: , Rfl:     NON FORMULARY, , Disp: , Rfl:     ondansetron (ZOFRAN-ODT) 4 mg disintegrating tablet, Take 4 mg by mouth as needed , Disp: , Rfl:     oxybutynin (DITROPAN) 5 mg tablet, Take 1 tablet (5 mg total) by mouth 3 (three) times a day as needed (bladder spasm), Disp: 20 tablet, Rfl: 0    pantoprazole (PROTONIX) 40 mg tablet, 40 mg, Disp: , Rfl:     traZODone (DESYREL) 150 mg tablet, Take 1 tablet (150 mg total) by mouth daily at bedtime, Disp: 30 tablet, Rfl: 0    divalproex sodium (DEPAKOTE ER) 250 mg 24 hr tablet, Take 1 tablet (250 mg total) by mouth daily (Patient not taking: Reported on 1/19/2022 ), Disp: 30 tablet, Rfl: 5    folic acid (RA Folic Acid) 784 mcg tablet, Take 1 tablet (400 mcg total) by mouth daily (Patient not taking: Reported on 1/19/2022 ), Disp: 30 tablet, Rfl: 0    Galcanezumab-gnlm 120 MG/ML SOAJ, Inject 240 mg (2 mL) subcutaneously one time for loading dose (Patient not taking: Reported on 1/19/2022 ), Disp: 2 mL, Rfl: 0    phenazopyridine (PYRIDIUM) 200 mg tablet, Take 1 tablet (200 mg total) by mouth 3 (three) times a day as needed for bladder spasms (Patient not taking: Reported on 1/19/2022 ), Disp: 30 tablet, Rfl: 0    RA FOLIC ACID 428 MCG tablet, take 1 tablet by mouth once daily (Patient not taking: Reported on 1/19/2022), Disp: 30 tablet, Rfl: 0    tamsulosin (FLOMAX) 0 4 mg, Take 1 capsule (0 4 mg total) by mouth daily with dinner (Patient not taking: Reported on 1/19/2022 ), Disp: 30 capsule, Rfl: 0      Allergies  Patient has no known allergies      Past Medical History  Past Medical History:   Diagnosis Date    Arthritis     Asthma     COPD [facility]chronic obstructive pulmonary disease) ([facility] [address] )     Coronary artery disease     Depression     Diabetes mellitus (HCC)     with weight loss no longer has high BS    Diverticulitis     Fibromyalgia     Fibromyalgia, primary     GERD (gastroesophageal reflux disease)     Hypertension     lost weight and no longer has    Migraine     Pancreatitis     Stomach problems          Past Surgical History:  Past Surgical History:   Procedure Laterality Date    ABDOMINAL SURGERY      CHOLECYSTECTOMY      GALLBLADDER SURGERY      GASTRIC BYPASS      PANNICULECTOMY  2017    SC CYSTO/URETERO W/LITHOTRIPSY &INDWELL STENT INSRT Left 9/7/2021    Procedure: CYSTOSCOPY URETEROSCOPY WITH LITHOTRIPSY HOLMIUM LASER, AND INSERTION STENT URETERAL;  Surgeon: Kassandra Barker MD;  Location: [facility];  Service: Urology    SC WRIST Deborra Levo LIG Right 3/3/2020    Procedure: RELEASE CARPAL TUNNEL ENDOSCOPIC Right;  Surgeon: Smooth Head MD;  Location: [facility];  Service: Orthopedics    SC WRIST Deborra Levo LIG Left 3/18/2020    Procedure: RELEASE LEFT CARPAL TUNNEL ENDOSCOPIC;  Surgeon: Smooth Head MD;  Location: [facility];  Service: Orthopedics    STOMACH SURGERY      tummy tuck    TUBAL LIGATION           Family History:  Family History   Problem Relation Age of Onset    Rheum arthritis Mother     Hypertension Mother  COPD Mother     Diabetes Father        Social History:   reports that she has been smoking cigarettes  She has a 37 50 pack-year smoking history  She has never used smokeless tobacco  She reports that she does not drink alcohol and does not use drugs  I have reviewed the past medical history, surgical history, social and family history, current medications, allergies vitals, review of systems, and updated this information as appropriate today  Objective:    Physical Exam    Neurological Exam    GENERAL:  Cooperative in no acute distress  Well-developed and well-nourished    HEAD and NECK   Head is atraumatic normocephalic with no lesions or masses  Neck is supple with full range of motion    CARDIOVASCULAR  Carotid Arteries-no carotid bruits  NEUROLOGIC:  Mental Status-the patient is awake alert and oriented without aphasia or apraxia  Cranial Nerves: Visual fields are full to confrontation  Visual acuity is 20/20 with hand-held chart Extraocular movements are full without nystagmus  Pupils are 2-1/2 mm and reactive  Face is symmetrical to light touch  Movements of facial expression move symmetrically  Hearing is normal to finger rub bilaterally  Soft palate lifts symmetrically  Shoulder shrug is symmetrical  Tongue is midline without atrophy  Motor: No drift is noted on arm extension  Strength is full in the upper and lower extremities with normal bulk and tone  Gait is unremarkable  No cervical spine tenderness no occipital tenderness  ROS:  Review of Systems   Constitutional: Positive for appetite change (Decreased) and fatigue  Negative for fever  HENT: Positive for ear pain  Negative for drooling, tinnitus, trouble swallowing and voice change  Eyes: Positive for photophobia, pain and visual disturbance  Respiratory: Positive for shortness of breath  Negative for chest tightness  Cardiovascular: Negative for chest pain, palpitations and leg swelling     Gastrointestinal: Positive for abdominal pain, diarrhea, nausea and vomiting  Negative for constipation  Endocrine: Negative for cold intolerance and heat intolerance  Genitourinary: Positive for frequency and urgency  Negative for difficulty urinating  Musculoskeletal: Positive for back pain, gait problem, joint swelling, myalgias and neck pain  Pain on both knees and right hand     Skin: Negative for rash  Neurological: Positive for numbness (And tingling) and headaches  Negative for dizziness, tremors, seizures, syncope, facial asymmetry, speech difficulty, weakness and light-headedness  Psychiatric/Behavioral: Positive for dysphoric mood and sleep disturbance  Negative for agitation, behavioral problems, confusion and decreased concentration  The patient is not nervous/anxious and is not hyperactive

## 2022-02-03 ENCOUNTER — OFFICE VISIT (OUTPATIENT)
Dept: OBGYN CLINIC | Facility: CLINIC | Age: 57
End: 2022-02-03
Payer: COMMERCIAL

## 2022-02-03 VITALS
DIASTOLIC BLOOD PRESSURE: 74 MMHG | SYSTOLIC BLOOD PRESSURE: 115 MMHG | HEART RATE: 66 BPM | WEIGHT: 161.8 LBS | HEIGHT: 62 IN | BODY MASS INDEX: 29.77 KG/M2

## 2022-02-03 DIAGNOSIS — M65.311 TRIGGER FINGER OF RIGHT THUMB: ICD-10-CM

## 2022-02-03 DIAGNOSIS — M65.331 TRIGGER FINGER, RIGHT MIDDLE FINGER: Primary | ICD-10-CM

## 2022-02-03 PROCEDURE — 99213 OFFICE O/P EST LOW 20 MIN: CPT | Performed by: ORTHOPAEDIC SURGERY

## 2022-02-03 RX ORDER — CIPROFLOXACIN 250 MG/1
TABLET, FILM COATED ORAL
COMMUNITY
Start: 2022-01-25

## 2022-02-03 NOTE — PROGRESS NOTES
Patient Name:  Anahi Sanchez  MRN:  [id number]    [address] [place] [address]     1  Trigger finger, right middle finger  -     Ambulatory Referral to Hand Surgery; Future    2  Trigger finger of right thumb  -     Ambulatory Referral to Hand Surgery; Future    Shelton Babcock is a pleasant 49year old who presents to the office today for a follow-up evaluation of her right thumb and long finger trigger fingers  She underwent a right thumb and right long finger A1 pulley corticosteroid injections on 12/16/2021  Upon evaluation today, she has no active triggering of the right thumb or right long finger but does report intermittent mild triggering of the right long finger at home which is still 80% improved when compared to prior to injection  I discussed with the patient at this time I recommend her following up with a hand specialist for further evaluation and possible consideration of trigger finger release if symptoms worsen  A referral was placed at today's visit  I will follow-up with her as needed  She understood and had no further questions  History of the Present Illness   Anahi Sanchez is a 49 y o  female who presents to the office today for a follow-up evaluation of her right thumb and long finger trigger fingers  She underwent a right thumb and right long finger A1 pulley corticosteroid injections on 12/16/2021  She states that she is still experiencing relief from the right thumb A1 pulley corticosteroid injection  She states that she will experience intermittent locking of the right long finger but overall reports 80% improvement  She states that the locking it is worse in the morning  She states that the pain has improved  She denies any issues with pinching or gripping  Review of Systems     Review of Systems   Constitutional: Negative for chills, fever and unexpected weight change  HENT: Negative for hearing loss, nosebleeds and sore throat      Eyes: Negative for pain, redness and visual disturbance  Respiratory: Negative for cough, shortness of breath and wheezing  Cardiovascular: Negative for chest pain, palpitations and leg swelling  Gastrointestinal: Negative for abdominal pain, nausea and vomiting  Endocrine: Negative for polyphagia and polyuria  Genitourinary: Negative for dysuria and hematuria  Musculoskeletal: Negative for arthralgias, joint swelling and myalgias  Skin: Negative for rash and wound  Neurological: Negative for dizziness, numbness and headaches  Psychiatric/Behavioral: Negative for confusion and suicidal ideas  The patient is not nervous/anxious  Physical Exam     /74   Pulse 66   Ht 5' 2" (1 575 m)   Wt 73 4 kg (161 lb 12 8 oz)   LMP 07/08/2019 (LMP Unknown)   BMI 29 59 kg/m²     Right Thumb  NTTP A1 Pulley  No active triggering  Full active range of motion  Brisk capillary refill  S/m intact median, radial, and ulnar nerve     Right Long Finger  TTP A1 Pulley  No active triggering  Full active range of motion  Brisk capillary refill  S/m intact median, radial, and ulnar nerve     Data Review     I have personally reviewed pertinent films in PACS, and my interpretation follows  No new images reviewed today      Social History     Tobacco Use    Smoking status: Current Every Day Smoker     Packs/day: 1 50     Years: 25 00     Pack years: 37 50     Types: Cigarettes    Smokeless tobacco: Never Used   Vaping Use    Vaping Use: Never used   Substance Use Topics    Alcohol use: No    Drug use: No           Procedures    Scribe Attestation    I,:  Karen Madera am acting as a scribe while in the presence of the attending physician :       I,:  Kelly Stovall DO personally performed the services described in this documentation    as scribed in my presence :

## 2022-03-11 ENCOUNTER — TELEPHONE (OUTPATIENT)
Dept: UROLOGY | Facility: CLINIC | Age: 57
End: 2022-03-11

## 2022-03-11 DIAGNOSIS — N20.0 NEPHROLITHIASIS: Primary | ICD-10-CM

## 2022-03-11 NOTE — TELEPHONE ENCOUNTER
Called patient and spoke with her regarding her appointment on wednesday,  She needs to have an ultrasound done prior  I tried to give her central scheduling number however she was driving  She was made aware that she needs to have it done by Wednesday or we will have to reschedule her  She also has a ct order in there to be completed  Please assist her in scheduling both studies  If she cant get in piror to Wednesday than she will need to be rescheduled

## 2022-03-24 DIAGNOSIS — G43.719 CHRONIC MIGRAINE WITHOUT AURA, INTRACTABLE, WITHOUT STATUS MIGRAINOSUS: ICD-10-CM

## 2022-03-24 RX ORDER — DIVALPROEX SODIUM 250 MG/1
250 TABLET, EXTENDED RELEASE ORAL DAILY
Qty: 30 TABLET | Refills: 5 | Status: SHIPPED | OUTPATIENT
Start: 2022-03-24 | End: 2022-08-01

## 2022-03-24 NOTE — TELEPHONE ENCOUNTER
Received fax from [facility] for refill of Depakote 250 mg     Order pended below, please review and sign if agreeable

## 2022-04-06 ENCOUNTER — HOSPITAL ENCOUNTER (OUTPATIENT)
Dept: ULTRASOUND IMAGING | Facility: CLINIC | Age: 57
Discharge: HOME | End: 2022-04-06
Payer: COMMERCIAL

## 2022-04-06 DIAGNOSIS — N20.0 NEPHROLITHIASIS: ICD-10-CM

## 2022-04-06 PROCEDURE — 76770 US EXAM ABDO BACK WALL COMP: CPT

## 2022-04-08 ENCOUNTER — TELEPHONE (OUTPATIENT)
Dept: NEUROLOGY | Facility: CLINIC | Age: 57
End: 2022-04-08

## 2022-04-08 NOTE — TELEPHONE ENCOUNTER
Pt called and states that she takes emgality monthly and usually gets this med through specialty pharmacy  This has not been delivered bec it requires reauthorization  States that prior emgality, she was getting >15 migraines per month and it would last for days  While on emgality, she was getting 1-2 mild HA  No migraines  Per enc 10/6/21-PA  22    PA initiated on CMM   Key: [id number]    Awaiting determination

## 2022-05-16 NOTE — H&P (VIEW-ONLY)
CHIEF COMPLAINT:  Chief Complaint   Patient presents with    Right Wrist - Follow-up    Left Wrist - Follow-up       SUBJECTIVE:  Raimundo Krishnamurthy is a 48y o  year old female who presents for follow-up regarding bilateral hand numbness and tingling  Patient had an EMG and is here for follow-up  She states that the left hand is more bothersome than the right  She states the numbness wakes her from sleep at night  The patient denies any cardiac  She does have a history of COPD  Denies diabetes  Denies any history of MI, gastric ulcers, kidney or liver issues  Denies blood thinners  She had gastric bypass surgery as well as has fibromyalgia      PAST MEDICAL HISTORY:  Past Medical History:   Diagnosis Date    Arthritis     Asthma     [facility] [address] [facility]     Depression     [facility]     Diverticulitis     Hypertension     Migraine     Pancreatitis     Stomach problems        PAST SURGICAL HISTORY:  Past Surgical History:   Procedure Laterality Date    GALLBLADDER SURGERY      GASTRIC BYPASS      STOMACH SURGERY      tummy tuck    TUBAL LIGATION         FAMILY HISTORY:  Family History   Problem Relation Age of Onset    Rheum arthritis Mother     Hypertension Mother     COPD Mother     Diabetes Father        SOCIAL HISTORY:  Social History     Tobacco Use    Smoking status: Current Every Day Smoker     Packs/day: 1 00     Types: Cigarettes    Smokeless tobacco: Never Used   Substance Use Topics    Alcohol use: No    Drug use: No       MEDICATIONS:    Current Outpatient Medications:     acetaminophen (TYLENOL) 650 mg CR tablet, Take 1 tablet by mouth every 8 (eight) hours, Disp: , Rfl:     albuterol (2 5 mg/3 mL) 0 083 % nebulizer solution, Take 2 5 mg by nebulization 4 (four) times a day, Disp: , Rfl:     albuterol (PROVENTIL HFA,VENTOLIN HFA) 90 mcg/act inhaler, Inhale 2 puffs every 6 (six) hours as needed for wheezing, Disp: , Rfl:    [facility] Note  Quick DC. Inappropriate Request   Refill request requires further review by MD: NO   Medication Therapy Plan: Pharmacy is requesting new script(s) for the following medications without required information, (sig/ frequency/qty/etc)     [facility] action(s):  Quick Discontinue      Duplicate Pended Encounter(s)/ Last Prescribed Details:    Pharmacies have been requesting medications for patients without required information, (sig, frequency, qty, etc.). In addition, requests are sent for medication(s) pt. are currently not taking, and medications patients have never taken.    We have spoken to the pharmacies about these request types and advised their teams previously that we are unable to assess these New Script requests and require all details for these requests. This is a known issue and has been reported.        Medication related problems are not assessed for [facility].   Medication Reconciliation Completed? NO Were there pending details that required adjustment? NO     Automatic Epic Generated Protocol Data Below:   Requested Prescriptions   Pending Prescriptions Disp Refills    sildenafiL (VIAGRA) 50 MG tablet [Pharmacy Med Name: SILDENAFIL TABS 50MG]  0              Appointments      Date Provider   Last Visit   5/12/2022 Nigel Naranjo MD   Next Visit   Visit date not found Nigel Naranjo MD        Note composed:6:45 PM 05/16/2022           budesonide-formoterol (SYMBICORT) 160-4 5 mcg/act inhaler, Inhale 2 puffs 2 (two) times a day, Disp: , Rfl:     celecoxib (CeleBREX) 100 mg capsule, Take 1 capsule (100 mg total) by mouth 2 (two) times a day, Disp: 60 capsule, Rfl: 3    cyclobenzaprine (FLEXERIL) 10 mg tablet, Take 1 tablet (10 mg total) by mouth daily at bedtime, Disp: 30 tablet, Rfl: 3    DULoxetine (CYMBALTA) 30 mg delayed release capsule, Take 30 mg by mouth daily, Disp: , Rfl:     ergocalciferol (VITAMIN D2) 50,000 units, Take 50,000 Units by mouth once a week, Disp: , Rfl:     gabapentin (NEURONTIN) 600 MG tablet, Take 600 mg by mouth 3 (three) times a day, Disp: , Rfl:     ipratropium (ATROVENT) 0 02 % nebulizer solution, Inhale 0 25 mg every 6 (six) hours as needed, Disp: , Rfl:     magnesium oxide (MAG-OX) 400 mg, Take 400 mg by mouth 2 (two) times a day, Disp: , Rfl:     mometasone (ASMANEX TWISTHALER) 220 MCG/INH inhaler, Inhale 220 mcg daily, Disp: , Rfl:     NON FORMULARY, , Disp: , Rfl:     ondansetron (ZOFRAN-ODT) 4 mg disintegrating tablet, ondansetron 4 mg disintegrating tablet, Disp: , Rfl:     pantoprazole (PROTONIX) 40 mg tablet, 40 mg, Disp: , Rfl:     predniSONE 10 mg tablet, Take 4 tablets for 3 days then take 3 tablets for 3 days then take 2 tablets for 3 days then take 1 tablet for 3 days, Disp: , Rfl:     tiotropium (SPIRIVA RESPIMAT) 2 5 MCG/ACT AERS inhaler, Inhale daily, Disp: , Rfl:     tiotropium-olodaterol (STIOLTO RESPIMAT) 2 5-2 5 MCG/ACT inhaler, Inhale 2 puffs daily, Disp: , Rfl:     topiramate (TOPAMAX) 100 mg tablet, Take 100 mg by mouth 2 (two) times a day, Disp: , Rfl:     dicyclomine (BENTYL) 20 mg tablet, Take 1 tablet (20 mg total) by mouth 2 (two) times a day for 5 days, Disp: 10 tablet, Rfl: 0    HYDROcodone-acetaminophen (NORCO) 5-325 mg per tablet, Take 1 tablet by mouth every 6 (six) hours as needed for painMax Daily Amount: 4 tablets, Disp: 20 tablet, Rfl: 0    ondansetron (ZOFRAN) 4 mg tablet, Take 1 tablet (4 mg total) by mouth every 6 (six) hours for 3 days, Disp: 12 tablet, Rfl: 0    ALLERGIES:  No Known Allergies    REVIEW OF SYSTEMS:  Review of Systems  ROS:   General: no fever, no chills  HEENT:  No loss of hearing or eyesight problems  Eyes:  No red eyes  Respiratory:  No coughing, shortness of breath or wheezing  Cardiovascular:  No chest pain, no palpitations  GI:  Abdomen soft nontender, denies nausea  Endocrine:  No muscle weakness, no frequent urination, no excessive thirst  Urinary:  No dysuria, no incontinence  Musculoskeletal: see HPI and PE  SKIN:  No skin rash, no dry skin  Neurological:  No headaches, no confusion  Psychiatric:  No suicide thoughts, no anxiety, no depression  Review of all other systems is negative  VITALS:  Vitals:    02/27/20 0752   BP: 129/84   Pulse: 71       LABS:  HgA1c: No results found for: HGBA1C  BMP:   Lab Results   Component Value Date    CALCIUM 9 3 07/19/2019    K 3 9 07/19/2019    CO2 27 07/19/2019     07/19/2019    BUN 10 07/19/2019    CREATININE 0 61 07/19/2019       _____________________________________________________  PHYSICAL EXAMINATION:  General: well developed and well nourished, alert, oriented times 3 and appears comfortable  Psychiatric: Normal  HEENT: Trachea Midline, No torticollis  Heart:  Regular rate and rhythm  Lungs:  Clear to auscultation  Pulmonary: No audible wheezing or respiratory distress   Skin: No masses, erythema, lacerations, fluctation, ulcerations  Neurovascular: Sensation Intact to the Median, Ulnar, Radial Nerve, Motor Intact to the Median, Ulnar, Radial Nerve and Pulses Intact    MUSCULOSKELETAL EXAMINATION:  Bilateral carpal tunnel exam:    Negative thenar atrophy  Positive Phalen's test   Positive carpal tunnel compression  Negative Tinel's over the median nerve at the wrist   Opposition strength 5/5  Abduction strength 5/5      Two point discrimination is 4 mm throughout on the left hand was 2nd guessing on the thumb, index finger  Right hand was not tested for 2 point discrimination    ___________________________________________________  STUDIES REVIEWED:  EMG bilateral upper extremity demonstrated moderate median nerve compression at the wrist with demyelinating changes right worse than left; moderate ulnar neuropathy at the elbow on the left  PROCEDURES PERFORMED:  Procedures  No Procedures performed today    _____________________________________________________  ASSESSMENT/PLAN:    Bilateral carpal tunnel syndrome, left> right  - it was discussed with the patient that we will proceed with right endoscopic carpal tunnel release as she is left-handed  We will then proceed with left endoscopic carpal tunnel release about 2 weeks after surgery  Detail consent was obtained for both sides   Surgery: right endoscopic carpal tunnel release   Anesthesia:  IV sedation   Antibiotics:  None   Medical clearance: not needed   Hand therapy: ordered   Consent: obtained   Post op pain medication sent to pharmacy today    Surgery medication instructions: You will stop eating and drinking at midnight the night before your surgery, but you may continue to take your normal medications with a small sip of water  In the morning on the day of your surgery, we would like you to take the following medication:   Tylenol 500mg one tablet by mouth    After surgery, we would like you to take Tylenol 500 mg one tablet by mouth every 6 hours  (at breakfast, lunch and dinner) for 5-7 days after your surgery  Please take this medication EVERYDAY after surgery for 5-7 days, and not just as needed  Taking this medications after surgery will limit your need for prescription pain medication  We will also prescribe a narcotic pain medication for a limited time after surgery that you can take as needed for moderate or severe pain  The narcotic pain medication may also have Tylenol in it    Please limit Tylenol usage to under 3,000mg a day  Diagnoses and all orders for this visit:    Bilateral carpal tunnel syndrome  -     Ambulatory referral to PT/OT hand therapy; Future  -     Case request operating room: RELEASE CARPAL TUNNEL ENDOSCOPIC Right; Standing  -     Case request operating room: RELEASE CARPAL TUNNEL ENDOSCOPIC Right  -     HYDROcodone-acetaminophen (NORCO) 5-325 mg per tablet; Take 1 tablet by mouth every 6 (six) hours as needed for painMax Daily Amount: 4 tablets    Other orders  -     Diet NPO; Sips with meds; Standing  -     Height and weight upon arrival; Standing  -     Void on call to OR; Standing  -     Insert peripheral IV; Standing          Follow Up:  Return for post op   Work/school status:  No restrictions    To Do Next Visit:  Re-evaluation of current issue    Operative Discussions:  Endoscopic Carpal Tunnel Release: The anatomy and physiology of carpal tunnel syndrome was discussed with the patient today  Increase pressure localized under the transverse carpal ligament can cause pain, numbness, tingling, or dysesthesias within the median nerve distribution as well as feelings of fatigue, clumsiness, or awkwardness  These symptoms typically occur at night and worse in the morning upon waking  Eventually, untreated carpal tunnel syndrome can result in weakness and permanent loss of muscle within the thenar compartment of the hand  Treatment options were discussed with the patient  Conservative treatment includes nocturnal resting splints to keep the nerve in a neutral position, ergonomic changes within the work or home environment, activity modification, and tendon gliding exercises  Vitamin B6 one tablet daily over the counter may helpful to reduce symptoms  Steroid injections within the carpal canal can help a majority of patients, however this is often self-limited in a majority of patients    Surgical intervention to divide the transverse carpal ligament typically results in a long-lasting relief of the patient's complaints, with the recurrence rate of less than 1%  The patient has elected to undergo an endoscopic carpal tunnel release  The single incision technique was discussed with the patient, which results in approximately a two-week recovery time less wound complications  In the postoperative period, light activities are allowed immediately, driving is allowed when narcotic medication has stopped, and the bandages may be removed and incision may get wet after 2 days  Heavy activities (lifting more than approximately 10 pounds) will be allowed after follow up appointment in 1-2 weeks  While night symptoms (waking from sleep, pain, and discomfort in the hands) generally improves rapidly, the numbness and tingling as well as the strength will slowly improve over weeks to months depending on the chronicity and severity of the carpal tunnel syndrome  Pillar pain and scar discomfort were discussed with the patient which are self-limiting conditions  The risks of bleeding and infection from the surgery are less than 1%  Risk of recurrence is approximately 0 5%  The risks of nerve injury or nerve damage or damage to the blood vessels is approximately 1 in 1200  The patient has an understanding of the above mentioned discussion  The risks and benefits of the procedure were explained to the patient, which include, but are not limited to: Bleeding, infection, recurrence, pain, scar, damage to tendons, damage to nerves, and damage to blood vessels, failure to give desired results and complications related to anesthesia  These risks, along with alternative conservative treatment options, and postoperative protocols were voiced back and understood by the patient  All questions were answered to the patient's satisfaction  The patient agrees to comply with a standard postoperative protocol, and is willing to proceed  Education was provided via written and auditory forms    There were no barriers to learning  Written handouts regarding wound care, incision and scar care, and general preoperative information was provided to the patient  Prior to surgery, the patient may be requested to stop all anti-inflammatory medications  Prophylactic aspirin, Plavix, and Coumadin may be allowed to be continued  Medications including vitamin E , ginkgo, &fish oil are requested to be stopped about one week      Scribe Attestation    I,:   Donaldo Whittaker PA-C am acting as a scribe while in the presence of the attending physician :        I,:   Alta Machuca MD personally performed the services described in this documentation    as scribed in my presence :

## 2022-06-08 ENCOUNTER — TELEPHONE (OUTPATIENT)
Dept: NEUROLOGY | Facility: CLINIC | Age: 57
End: 2022-06-08

## 2022-06-15 ENCOUNTER — TELEMEDICINE (OUTPATIENT)
Dept: NEUROLOGY | Facility: CLINIC | Age: 57
End: 2022-06-15
Payer: COMMERCIAL

## 2022-06-15 VITALS — WEIGHT: 156 LBS | DIASTOLIC BLOOD PRESSURE: 80 MMHG | BODY MASS INDEX: 28.53 KG/M2 | SYSTOLIC BLOOD PRESSURE: 118 MMHG

## 2022-06-15 DIAGNOSIS — G43.719 INTRACTABLE CHRONIC MIGRAINE WITHOUT AURA AND WITHOUT STATUS MIGRAINOSUS: Primary | ICD-10-CM

## 2022-06-15 DIAGNOSIS — R51.9 HEADACHE: ICD-10-CM

## 2022-06-15 DIAGNOSIS — G43.719 INTRACTABLE CHRONIC MIGRAINE WITHOUT AURA AND WITHOUT STATUS MIGRAINOSUS: ICD-10-CM

## 2022-06-15 PROCEDURE — G2012 BRIEF CHECK IN BY MD/QHP: HCPCS | Performed by: PSYCHIATRY & NEUROLOGY

## 2022-06-15 RX ORDER — GALCANEZUMAB 100 MG/ML
300 INJECTION, SOLUTION SUBCUTANEOUS
COMMUNITY
End: 2022-06-15

## 2022-06-15 RX ORDER — FREMANEZUMAB-VFRM 225 MG/1.5ML
INJECTION SUBCUTANEOUS
Qty: 1.5 ML | Refills: 0 | Status: SHIPPED | OUTPATIENT
Start: 2022-06-15 | End: 2022-06-15 | Stop reason: SDUPTHER

## 2022-06-15 RX ORDER — FREMANEZUMAB-VFRM 225 MG/1.5ML
INJECTION SUBCUTANEOUS
Qty: 1.5 ML | Refills: 5 | Status: SHIPPED | OUTPATIENT
Start: 2022-06-15

## 2022-06-15 NOTE — LETTER
Sonia 15, 2022     Sean Donovan MD  [facility] [address] [place] [address]    Patient: Magali Brand   YOB: 1965   Date of Visit: 6/15/2022       Dear Dr Phillip Jones: Thank you for referring Magali Brand to me for evaluation  Below are my notes for this consultation  If you have questions, please do not hesitate to call me  I look forward to following your patient along with you  Sincerely,        Leti Simmons MD        CC: No Recipients  Leti Simmons MD  6/15/2022  9:25 AM  Signed    Virtual Regular Visit    Verification of patient location:    Patient is located in the following state in which I hold an active license [place]    Plan:  Discontinue Emgality  Initiate Ajovy monthly  Continue Imitrex as needed  Follow-up with Dr Phillip Jones    Discussion:    Elias Gresham reports a 25 year history of chronic migraine headaches that at 1 point were occurring almost daily  They have been resistant to various oral prophylactic approaches including Depakote, Topamax and gabapentin  She had been on Botox in the past with some reduction in headache frequency but not dramatic  She was recently started on Emgality it initially she did not notice an improvement but reports that over the last few months her headache frequency has diminished to about 9 headaches a month  Given this she would not be a candidate for Botox  Will have her try Ajovy to see if she gets a more significant reduction in headache frequency and understands that this can be given monthly or 3 doses every 3 months  If this is not effective consider the intravenous CGRP given every 3 months  As she is not a candidate for Botox I will left Dr Phillip Jones resume her care      Problem List Items Addressed This Visit        Cardiovascular and Mediastinum    Chronic migraine without aura without status migrainosus, not intractable - Primary    Relevant Medications    Galcanezumab-gnlm (Emgality, 300 MG Dose,) 100 MG/ML SOSY Other Visit Diagnoses     Headache                   Reason for visit is   Chief Complaint   Patient presents with    Virtual Regular Visit        Encounter provider Evelyn Pedro MD    Provider located at [address] [id number]      Recent Visits  Date Type Provider Dept   06/08/22 Telephone Jessica Manning 17 recent visits within past 7 days and meeting all other requirements  Today's Visits  Date Type Provider Dept   06/15/22 Telemedicine Evelyn Pedro  [facility] today's visits and meeting all other requirements  Future Appointments  No visits were found meeting these conditions  Showing future appointments within next 150 days and meeting all other requirements       The patient was identified by name and date of birth  Alex Wilburn was informed that this is a telemedicine visit and that the visit is being conducted through telephone conferencing as patient was not able to connect video via [employer] Teams in lieu of office visit in the face of ongoing [place] viral epidemic  Patient understands that this format is not completely HIPPA compliant, however I am sitting in my office alone with the door closed and patient is in a comfortable environment to speak with me about current health issues  She acknowledged consent and understanding of privacy and security of the video platform  The patient has agreed to participate and understands they can discontinue the visit at any time  Patient is aware this is a billable service  Subjective  Alex Wilburn is a 56 y o  female Salinas Peck is a 61-year-old woman reports that she has been getting frequent severe headaches for about the last 25 years  She states her headaches often localized to the left posterior head region but do move around and at times generalized    She describes a stabbing throbbing pain that can be debilitating associated with photophobia, sonophobia and nausea  She is not aware of anything that precipitated her headaches  She denies any aura associated with her headache  She was seen by Neurology at [facility] and was tried on Topamax but this was discontinued as she developed kidney stones  Botox was tried and she thinks she received for 5 treatments  She states that with Botox she had some reduction in headache frequency but was not dramatic  More recently she was followed by Dr Nancy Ricci and was placed on Depakote which she did not tolerate  Later she was put on Emgality states initially she did not see an improvement in her headache frequency which she reports her headaches were occurring almost daily  She reports in recent months there has been a definite improvement in headache frequency to a point where she states she gets about 9 headaches a month  She states that when she gets a headache she use either Imitrex or Tylenol  She states that 1 time she was taking excessive amounts of Tylenol to control her headache and caused some problems with her stomach  She does find that Zofran helps her nausea         HPI     Past Medical History:   Diagnosis Date    Arthritis     Asthma     [facility] [address] [facility]     Coronary artery disease     Depression     Diabetes mellitus ([facility] [address] [facility]     with weight loss no longer has high BS    Diverticulitis     Fibromyalgia     Fibromyalgia, primary     GERD (gastroesophageal reflux disease)     Hypertension     lost weight and no longer has    Migraine     Pancreatitis     Stomach problems        Past Surgical History:   Procedure Laterality Date    ABDOMINAL SURGERY      CHOLECYSTECTOMY      GALLBLADDER SURGERY      GASTRIC BYPASS      PANNICULECTOMY  2017    LA CYSTO/URETERO W/LITHOTRIPSY &INDWELL STENT INSRT Left 9/7/2021    Procedure: CYSTOSCOPY URETEROSCOPY WITH LITHOTRIPSY HOLMIUM LASER, AND INSERTION STENT URETERAL;  Surgeon: Tamiko Sterling MD;  [facility];  Service: Urology    AR WRIST Bridger Cope LIG Right 3/3/2020    Procedure: RELEASE CARPAL TUNNEL ENDOSCOPIC Right;  Surgeon: Hemanth Alexander MD;  [facility];  Service: Orthopedics    AR WRIST Bridger Cope LIG Left 3/18/2020    Procedure: RELEASE LEFT CARPAL TUNNEL ENDOSCOPIC;  Surgeon: Hemanth Alexander MD;  [facility];  Service: Orthopedics    STOMACH SURGERY      tummy tuck    TUBAL LIGATION         Current Outpatient Medications   Medication Sig Dispense Refill    acetaminophen (TYLENOL) 650 mg CR tablet Take 1 tablet by mouth every 8 (eight) hours      albuterol (2 5 mg/3 mL) 0 083 % nebulizer solution Take 2 5 mg by nebulization 4 (four) times a day      albuterol (PROVENTIL HFA,VENTOLIN HFA) 90 mcg/act inhaler Inhale 2 puffs every 6 (six) hours as needed for wheezing      budesonide-formoterol (SYMBICORT) 160-4 5 mcg/act inhaler Inhale 2 puffs 2 (two) times a day      celecoxib (CeleBREX) 100 mg capsule take 1 capsule by mouth twice a day 60 capsule 3    dicyclomine (BENTYL) 20 mg tablet Take 20 mg by mouth daily      DULoxetine (CYMBALTA) 30 mg delayed release capsule Take 30 mg by mouth 2 (two) times a day       ferrous sulfate 324 (65 Fe) mg Take 1 tablet (324 mg total) by mouth 2 (two) times a day before meals (Patient taking differently: Take 324 mg by mouth daily before breakfast) 60 tablet 0    gabapentin (NEURONTIN) 600 MG tablet Take 600 mg by mouth 2 (two) times a day       Galcanezumab-gnlm (Emgality, 300 MG Dose,) 100 MG/ML SOSY Inject 300 mg under the skin every 30 (thirty) days      Galcanezumab-gnlm 120 MG/ML SOAJ 1st month to take 240 mg subcutaneously and after that 120 mg subcutaneously once a month 1 mL 11    magnesium oxide (MAG-OX) 400 mg Take 400 mg by mouth 2 (two) times a day      NON FORMULARY       ondansetron (ZOFRAN-ODT) 4 mg disintegrating tablet Take 4 mg by mouth as needed       pantoprazole (PROTONIX) 40 mg tablet 40 mg      SUMAtriptan (IMITREX) 100 mg tablet One tablet once a day p r n  headache maximum twice a week  9 tablet 5    traZODone (DESYREL) 150 mg tablet Take 1 tablet (150 mg total) by mouth daily at bedtime 30 tablet 0    ciprofloxacin (CIPRO) 250 mg tablet take 1 tablet by mouth twice a day for 3 days (Patient not taking: Reported on 6/15/2022)      divalproex sodium (DEPAKOTE ER) 250 mg 24 hr tablet Take 1 tablet (250 mg total) by mouth daily 30 tablet 5    folic acid (RA Folic Acid) 456 mcg tablet Take 1 tablet (400 mcg total) by mouth daily (Patient not taking: No sig reported) 30 tablet 0    fremanezumab-vfrm (Ajovy) 225 MG/1 5ML prefilled syringe Inject 1 5 mL (225 mg total) under the skin every 30 (thirty) days (Patient not taking: Reported on 6/15/2022) 1 5 mL 5    Galcanezumab-gnlm 120 MG/ML SOAJ Inject 240 mg (2 mL) subcutaneously one time for loading dose (Patient not taking: No sig reported) 2 mL 0    ipratropium (ATROVENT) 0 02 % nebulizer solution Inhale 0 25 mg every 6 (six) hours as needed      oxybutynin (DITROPAN) 5 mg tablet Take 1 tablet (5 mg total) by mouth 3 (three) times a day as needed (bladder spasm) (Patient not taking: Reported on 6/15/2022) 20 tablet 0    phenazopyridine (PYRIDIUM) 200 mg tablet Take 1 tablet (200 mg total) by mouth 3 (three) times a day as needed for bladder spasms (Patient not taking: No sig reported) 30 tablet 0    RA FOLIC ACID 937 MCG tablet take 1 tablet by mouth once daily (Patient not taking: No sig reported) 30 tablet 0    tamsulosin (FLOMAX) 0 4 mg Take 1 capsule (0 4 mg total) by mouth daily with dinner (Patient not taking: No sig reported) 30 capsule 0     No current facility-administered medications for this visit          Allergies   Allergen Reactions    Nsaids Other (See Comments)     Pt post gastric bypass       Review of Systems   Constitutional: Negative  Negative for appetite change and fever  HENT: Negative  Negative for hearing loss, tinnitus, trouble swallowing and voice change  Eyes: Negative  Negative for photophobia and pain  Respiratory: Negative  Negative for shortness of breath  Cardiovascular: Negative  Negative for palpitations  Gastrointestinal: Negative  Negative for nausea and vomiting  Endocrine: Negative  Negative for cold intolerance  Genitourinary: Negative  Negative for dysuria, frequency and urgency  Musculoskeletal: Negative  Negative for myalgias and neck pain  Skin: Negative  Negative for rash  Neurological: Positive for headaches  Negative for dizziness, tremors, seizures, syncope, facial asymmetry, speech difficulty, weakness, light-headedness and numbness  Patient states migraines about 3-4 times a week    Hematological: Negative  Does not bruise/bleed easily  Psychiatric/Behavioral: Negative  Negative for confusion, hallucinations and sleep disturbance  Video Exam    Vitals:    06/15/22 0853   BP: 118/80   Weight: 70 8 kg (156 lb)       Physical Exam     I spent 12 minutes directly with the patient during this visit    VIRTUAL VISIT DISCLAIMER      Magali Cathie verbally agrees to participate in [employer]  Pt is aware that [employer] could be limited without vital signs or the ability to perform a full hands-on physical Aiden Andrew understands she or the provider may request at any time to terminate the video visit and request the patient to seek care or treatment in person

## 2022-06-15 NOTE — PROGRESS NOTES
Virtual Regular Visit    Verification of patient location:    Patient is located in the following state in which I hold an active license [place]    Plan:  Discontinue Emgality  Initiate Ajovy monthly  Continue Imitrex as needed  Follow-up with Dr Oma Su    Discussion:    Prasanna Laguna reports a 25 year history of chronic migraine headaches that at 1 point were occurring almost daily  They have been resistant to various oral prophylactic approaches including Depakote, Topamax and gabapentin  She had been on Botox in the past with some reduction in headache frequency but not dramatic  She was recently started on Emgality it initially she did not notice an improvement but reports that over the last few months her headache frequency has diminished to about 9 headaches a month  Given this she would not be a candidate for Botox  Will have her try Ajovy to see if she gets a more significant reduction in headache frequency and understands that this can be given monthly or 3 doses every 3 months  If this is not effective consider the intravenous CGRP given every 3 months  As she is not a candidate for Botox I will left Dr Oma Su resume her care      Problem List Items Addressed This Visit        Cardiovascular and Mediastinum    Chronic migraine without aura without status migrainosus, not intractable - Primary    Relevant Medications    Galcanezumab-gnlm (Emgality, 300 MG Dose,) 100 MG/ML SOSY      Other Visit Diagnoses     Headache                   Reason for visit is   Chief Complaint   Patient presents with    Virtual Regular Visit        Encounter provider Orlando Garcia MD    Provider located at [facility] [address] [place] [id number]      Recent Visits  Date Type Provider Dept   06/08/22 Telephone Jessica Manning 17 recent visits within past 7 days and meeting all other requirements  Today's Visits  Date Type Provider Dept   06/15/22 Telemedicine Mynor Garcia, [address] today's visits and meeting all other requirements  Future Appointments  No visits were found meeting these conditions  Showing future appointments within next 150 days and meeting all other requirements       The patient was identified by name and date of birth  Lily Velasquez was informed that this is a telemedicine visit and that the visit is being conducted through telephone conferencing as patient was not able to connect video via [employer] Teams in lieu of office visit in the face of ongoing [place] viral epidemic  Patient understands that this format is not completely HIPPA compliant, however I am sitting in my office alone with the door closed and patient is in a comfortable environment to speak with me about current health issues  She acknowledged consent and understanding of privacy and security of the video platform  The patient has agreed to participate and understands they can discontinue the visit at any time  Patient is aware this is a billable service  Subjective  Lily Velasquez is a 56 y o  female Eduardo Sanches is a 79-year-old woman reports that she has been getting frequent severe headaches for about the last 25 years  She states her headaches often localized to the left posterior head region but do move around and at times generalized  She describes a stabbing throbbing pain that can be debilitating associated with photophobia, sonophobia and nausea  She is not aware of anything that precipitated her headaches  She denies any aura associated with her headache  She was seen by Neurology at [facility] and was tried on Topamax but this was discontinued as she developed kidney stones  Botox was tried and she thinks she received for 5 treatments  She states that with Botox she had some reduction in headache frequency but was not dramatic    More recently she was followed by Dr Margarito Parra and was placed on Depakote which she did not tolerate  Later she was put on Emgality states initially she did not see an improvement in her headache frequency which she reports her headaches were occurring almost daily  She reports in recent months there has been a definite improvement in headache frequency to a point where she states she gets about 9 headaches a month  She states that when she gets a headache she use either Imitrex or Tylenol  She states that 1 time she was taking excessive amounts of Tylenol to control her headache and caused some problems with her stomach  She does find that Zofran helps her nausea         HPI     Past Medical History:   Diagnosis Date    Arthritis     Asthma     [facility]) [facility] [address] [facility]     Coronary artery disease     Depression     Diabetes mellitus ([facility] [address] )     with weight loss no longer has high BS    Diverticulitis     Fibromyalgia     Fibromyalgia, primary     GERD (gastroesophageal reflux disease)     Hypertension     lost weight and no longer has    Migraine     Pancreatitis     Stomach problems        Past Surgical History:   Procedure Laterality Date    ABDOMINAL SURGERY      CHOLECYSTECTOMY      GALLBLADDER SURGERY      GASTRIC BYPASS      PANNICULECTOMY  2017    [place] CYSTO/URETERO W/LITHOTRIPSY &INDWELL STENT INSRT Left 9/7/2021    Procedure: CYSTOSCOPY URETEROSCOPY WITH LITHOTRIPSY HOLMIUM LASER, AND INSERTION STENT URETERAL;  Surgeon: Spenser Desai MD;  [facility];  Service: Urology    FL WRIST Mary Derian LIG Right 3/3/2020    Procedure: RELEASE CARPAL TUNNEL ENDOSCOPIC Right;  Surgeon: Maricarmen Lim MD;  [facility];  Service: Orthopedics    FL WRIST Mary Derian LIG Left 3/18/2020    Procedure: RELEASE LEFT CARPAL TUNNEL ENDOSCOPIC;  Surgeon: Maricarmen Lim MD;  [facility];  Service: Orthopedics    STOMACH SURGERY      tummy [facility]    TUBAL LIGATION         Current Outpatient Medications   Medication Sig Dispense Refill    acetaminophen (TYLENOL) 650 mg CR tablet Take 1 tablet by mouth every 8 (eight) hours      albuterol (2 5 mg/3 mL) 0 083 % nebulizer solution Take 2 5 mg by nebulization 4 (four) times a day      albuterol (PROVENTIL HFA,VENTOLIN HFA) 90 mcg/act inhaler Inhale 2 puffs every 6 (six) hours as needed for wheezing      budesonide-formoterol (SYMBICORT) 160-4 5 mcg/act inhaler Inhale 2 puffs 2 (two) times a day      celecoxib (CeleBREX) 100 mg capsule take 1 capsule by mouth twice a day 60 capsule 3    dicyclomine (BENTYL) 20 mg tablet Take 20 mg by mouth daily      DULoxetine (CYMBALTA) 30 mg delayed release capsule Take 30 mg by mouth 2 (two) times a day       ferrous sulfate 324 (65 Fe) mg Take 1 tablet (324 mg total) by mouth 2 (two) times a day before meals (Patient taking differently: Take 324 mg by mouth daily before breakfast) 60 tablet 0    gabapentin (NEURONTIN) 600 MG tablet Take 600 mg by mouth 2 (two) times a day       Galcanezumab-gnlm (Emgality, 300 MG Dose,) 100 MG/ML SOSY Inject 300 mg under the skin every 30 (thirty) days      Galcanezumab-gnlm 120 MG/ML SOAJ 1st month to take 240 mg subcutaneously and after that 120 mg subcutaneously once a month 1 mL 11    magnesium oxide (MAG-OX) 400 mg Take 400 mg by mouth 2 (two) times a day      NON FORMULARY       ondansetron (ZOFRAN-ODT) 4 mg disintegrating tablet Take 4 mg by mouth as needed       pantoprazole (PROTONIX) 40 mg tablet 40 mg      SUMAtriptan (IMITREX) 100 mg tablet One tablet once a day p r n  headache maximum twice a week   9 tablet 5    traZODone (DESYREL) 150 mg tablet Take 1 tablet (150 mg total) by mouth daily at bedtime 30 tablet 0    ciprofloxacin (CIPRO) 250 mg tablet take 1 tablet by mouth twice a day for 3 days (Patient not taking: Reported on 6/15/2022)      divalproex sodium (DEPAKOTE ER) 250 mg 24 hr tablet Take 1 tablet (250 mg total) by mouth daily 30 tablet 5    folic acid (RA Folic Acid) 841 mcg tablet Take 1 tablet (400 mcg total) by mouth daily (Patient not taking: No sig reported) 30 tablet 0    fremanezumab-vfrm (Ajovy) 225 MG/1 5ML prefilled syringe Inject 1 5 mL (225 mg total) under the skin every 30 (thirty) days (Patient not taking: Reported on 6/15/2022) 1 5 mL 5    Galcanezumab-gnlm 120 MG/ML SOAJ Inject 240 mg (2 mL) subcutaneously one time for loading dose (Patient not taking: No sig reported) 2 mL 0    ipratropium (ATROVENT) 0 02 % nebulizer solution Inhale 0 25 mg every 6 (six) hours as needed      oxybutynin (DITROPAN) 5 mg tablet Take 1 tablet (5 mg total) by mouth 3 (three) times a day as needed (bladder spasm) (Patient not taking: Reported on 6/15/2022) 20 tablet 0    phenazopyridine (PYRIDIUM) 200 mg tablet Take 1 tablet (200 mg total) by mouth 3 (three) times a day as needed for bladder spasms (Patient not taking: No sig reported) 30 tablet 0    RA FOLIC ACID 141 MCG tablet take 1 tablet by mouth once daily (Patient not taking: No sig reported) 30 tablet 0    tamsulosin (FLOMAX) 0 4 mg Take 1 capsule (0 4 mg total) by mouth daily with dinner (Patient not taking: No sig reported) 30 capsule 0     No current facility-administered medications for this visit  Allergies   Allergen Reactions    Nsaids Other (See Comments)     Pt post gastric bypass       Review of Systems   Constitutional: Negative  Negative for appetite change and fever  HENT: Negative  Negative for hearing loss, tinnitus, trouble swallowing and voice change  Eyes: Negative  Negative for photophobia and pain  Respiratory: Negative  Negative for shortness of breath  Cardiovascular: Negative  Negative for palpitations  Gastrointestinal: Negative  Negative for nausea and vomiting  Endocrine: Negative  Negative for cold intolerance  Genitourinary: Negative  Negative for dysuria, frequency and urgency     Musculoskeletal: Negative  Negative for myalgias and neck pain  Skin: Negative  Negative for rash  Neurological: Positive for headaches  Negative for dizziness, tremors, seizures, syncope, facial asymmetry, speech difficulty, weakness, light-headedness and numbness  Patient states migraines about 3-4 times a week    Hematological: Negative  Does not bruise/bleed easily  Psychiatric/Behavioral: Negative  Negative for confusion, hallucinations and sleep disturbance  Video Exam    Vitals:    06/15/22 0853   BP: 118/80   Weight: 70 8 kg (156 lb)       Physical Exam     I spent 12 minutes directly with the patient during this visit    VIRTUAL VISIT DISCLAIMER      Deborah Sarmiento verbally agrees to participate in [employer]  Pt is aware that [employer] could be limited without vital signs or the ability to perform a full hands-on physical Patisteven Bills understands she or the provider may request at any time to terminate the video visit and request the patient to seek care or treatment in person

## 2022-06-29 ENCOUNTER — TELEPHONE (OUTPATIENT)
Dept: NEUROLOGY | Facility: CLINIC | Age: 57
End: 2022-06-29

## 2022-06-29 NOTE — TELEPHONE ENCOUNTER
Virgilio WREN required  BIN: [id number]  PCN: [id number]  ID: [id number]  Group: [id number]    Key: [id number]    PA submitted via CM  Awaiting determination

## 2022-07-05 NOTE — TELEPHONE ENCOUNTER
[facility] called regarding ramona  Made her aware that PA was submitted on 6/29 and have not received determination yet  She states that we can call [facility] at [phone]  Attempted to call this number and received message that it is a   Non working number at [facility]    Called [facility] at [phone]  [email or web address]   She states that they have not received PA via CMM  She requested that we fax PA  She will fax PA form to CV office  States that form can be faxed to    [phone]  Fax received but not the correct form  Located migraine prevention agents PA form on [employer] website  Form completed and faxed    Placed at  to be scanned into chart

## 2022-07-07 NOTE — TELEPHONE ENCOUNTER
Patient returned call  States she just spoke w/insurance and [facility]  [employer] states they have not received PA for Ajovy  Confirmed insurance info w/patient  Per review of PA, incorrect insurance ID# is listed on form  Form updated and faxed with 6/15/22 LOV notes  Requested urgent review due to delay

## 2022-07-07 NOTE — TELEPHONE ENCOUNTER
Recieved fax from [employer] stating that they are unable to further process this request  It states pts listed on PA cannot be located in the member data base  Called pt and left detailed message of same (ok per consent on file)  Asked pt to call back to let us know if she has new insurance  Advised pt to call [facility] member services if she still has their insurance  Awaiting cb

## 2022-07-09 NOTE — TELEPHONE ENCOUNTER
PA denied  Denial letter scanned to chart  Letter states: To meet PA rules, your doctor must provide the following information:  - You have tried and failed the following preferred drug: [address] do not have a medical reason why you can't use this drug (contraindication)  Dr Mell Laguna - Pt has not tried Aimovig  Pt is agreeable to try Aimovig  Please place Rx

## 2022-07-11 DIAGNOSIS — G43.019 INTRACTABLE MIGRAINE WITHOUT AURA AND WITHOUT STATUS MIGRAINOSUS: Primary | ICD-10-CM

## 2022-07-11 RX ORDER — ERENUMAB-AOOE 140 MG/ML
INJECTION, SOLUTION SUBCUTANEOUS
Qty: 1 ML | Refills: 5 | Status: SHIPPED | OUTPATIENT
Start: 2022-07-11 | End: 2022-07-19 | Stop reason: SDUPTHER

## 2022-07-14 ENCOUNTER — TELEPHONE (OUTPATIENT)
Dept: NEUROLOGY | Facility: CLINIC | Age: 57
End: 2022-07-14

## 2022-07-14 DIAGNOSIS — G43.019 INTRACTABLE MIGRAINE WITHOUT AURA AND WITHOUT STATUS MIGRAINOSUS: ICD-10-CM

## 2022-07-19 ENCOUNTER — OFFICE VISIT (OUTPATIENT)
Dept: NEUROLOGY | Facility: CLINIC | Age: 57
End: 2022-07-19
Payer: COMMERCIAL

## 2022-07-19 VITALS
BODY MASS INDEX: 29.44 KG/M2 | TEMPERATURE: 97.8 F | OXYGEN SATURATION: 97 % | HEIGHT: 62 IN | DIASTOLIC BLOOD PRESSURE: 76 MMHG | HEART RATE: 78 BPM | SYSTOLIC BLOOD PRESSURE: 118 MMHG | WEIGHT: 160 LBS

## 2022-07-19 DIAGNOSIS — I10 HYPERTENSION, UNSPECIFIED TYPE: ICD-10-CM

## 2022-07-19 DIAGNOSIS — F17.200 SMOKING: ICD-10-CM

## 2022-07-19 DIAGNOSIS — G43.709 CHRONIC MIGRAINE WITHOUT AURA WITHOUT STATUS MIGRAINOSUS, NOT INTRACTABLE: Primary | ICD-10-CM

## 2022-07-19 PROCEDURE — 99214 OFFICE O/P EST MOD 30 MIN: CPT | Performed by: PSYCHIATRY & NEUROLOGY

## 2022-07-19 RX ORDER — SUMATRIPTAN 100 MG/1
TABLET, FILM COATED ORAL
Qty: 9 TABLET | Refills: 5 | Status: SHIPPED | OUTPATIENT
Start: 2022-07-19

## 2022-07-19 RX ORDER — ERENUMAB-AOOE 140 MG/ML
INJECTION, SOLUTION SUBCUTANEOUS
Qty: 1 ML | Refills: 5 | Status: SHIPPED | OUTPATIENT
Start: 2022-07-19

## 2022-07-19 NOTE — PROGRESS NOTES
Amelia Maddxo is a 56 y o  female  Chief Complaint   Patient presents with    Chronic migraine without aura, intractable, without status        Assessment:  1  Chronic migraine without aura without status migrainosus, not intractable    2  Hypertension, unspecified type    3  Smoking        Plan:  Continue with magnesium oxide 400 mg twice a day  Amovig once a month  Quit smoking  Avoid migraine triggers  Follow-up in 4 months  Discussion:  Patient has failed multiple medications in the past and has a history of occipital neuralgia with transformed migraine and analgesic rebound headache with chronic daily headaches, she has history of kidney stones and hence could not use Topamax and her Topamax was discontinued she did not tolerate Depakote, she is already on gabapentin 600 mg twice a day, she was taking Thai Luis Antonio but it was not giving her enough relief, she saw my associate Dr Faviola Santos and felt that she did not qualify for Botox and initially was given Ajovy but her insurance did not allow and hence she was put on Aimovig, she has not received that, she is going to call or pharmacy today and see if it has been approved and then call me, she was advised to keep her blood pressure cholesterol and sugar under control  She was advised to avoid migraine triggers which we discussed in detail including foods to avoid, she was advised to quit smoking, also was advised to avoid excessive use of over-the-counter analgesics, to keep herself well hydrated, to go to the hospital if has any worsening symptoms and call me otherwise to see me back in 4 months and follow up with other physicians      Subjective:    HPI   Patient is here in follow-up for her chronic migraine headaches, she is past medical history significant for hypertension diabetes under control, since she had bariatric surgery in March of 2017, chronic pain syndrome/fibromyalgia, she is in follow up with [facility] and has history of chronic migraine headaches and occipital neuralgia and has had occipital nerve blocks, he also has had Botox in the past she smokes 2 packs per day, her Topamax was discontinued in the past due to kidney stone she is on gabapentin 600 mg twice a day and magnesium oxide 400 mg twice a day and did not tolerate Depakote  She was initially taking Emgality but still was having headaches and she saw my associate Dr Candido Mattson and was felt that she would not qualify for Botox and hence was advised to go on Aimovig, she has not received the medication yet she still continues to have 3-4 headaches a week they are mostly left-sided associated with photophobia and phonophobia sometimes it is associated with visual aura she takes Tylenol and Imitrex couple of times a week, does not drink caffeine she smokes 2 packs per day no visual symptoms denies any motor or sensory symptoms in upper or lower extremities no other complaints      Vitals:    07/19/22 0832   BP: 118/76   BP Location: Right arm   Patient Position: Sitting   Cuff Size: Adult   Pulse: 78   SpO2: 97%   Height: 5' 2" (1 575 m)       Current Medications    Current Outpatient Medications:     acetaminophen (TYLENOL) 650 mg CR tablet, Take 1 tablet by mouth every 8 (eight) hours, Disp: , Rfl:     albuterol (2 5 mg/3 mL) 0 083 % nebulizer solution, Take 2 5 mg by nebulization 4 (four) times a day, Disp: , Rfl:     albuterol (PROVENTIL HFA,VENTOLIN HFA) 90 mcg/act inhaler, Inhale 2 puffs every 6 (six) hours as needed for wheezing, Disp: , Rfl:     budesonide-formoterol (SYMBICORT) 160-4 5 mcg/act inhaler, Inhale 2 puffs 2 (two) times a day, Disp: , Rfl:     celecoxib (CeleBREX) 100 mg capsule, take 1 capsule by mouth twice a day, Disp: 60 capsule, Rfl: 3    dicyclomine (BENTYL) 20 mg tablet, Take 20 mg by mouth daily, Disp: , Rfl:     ferrous sulfate 324 (65 Fe) mg, Take 1 tablet (324 mg total) by mouth 2 (two) times a day before meals (Patient taking differently: Take 324 mg by mouth daily before breakfast), Disp: 60 tablet, Rfl: 0    gabapentin (NEURONTIN) 600 MG tablet, Take 600 mg by mouth 2 (two) times a day , Disp: , Rfl:     magnesium oxide (MAG-OX) 400 mg, Take 400 mg by mouth 2 (two) times a day, Disp: , Rfl:     ondansetron (ZOFRAN-ODT) 4 mg disintegrating tablet, Take 4 mg by mouth as needed , Disp: , Rfl:     pantoprazole (PROTONIX) 40 mg tablet, 40 mg, Disp: , Rfl:     SUMAtriptan (IMITREX) 100 mg tablet, One tablet once a day p r n  headache maximum twice a week , Disp: 9 tablet, Rfl: 5    traZODone (DESYREL) 150 mg tablet, Take 1 tablet (150 mg total) by mouth daily at bedtime, Disp: 30 tablet, Rfl: 0    ciprofloxacin (CIPRO) 250 mg tablet, take 1 tablet by mouth twice a day for 3 days (Patient not taking: No sig reported), Disp: , Rfl:     divalproex sodium (DEPAKOTE ER) 250 mg 24 hr tablet, Take 1 tablet (250 mg total) by mouth daily, Disp: 30 tablet, Rfl: 5    DULoxetine (CYMBALTA) 30 mg delayed release capsule, Take 30 mg by mouth 2 (two) times a day , Disp: , Rfl:     Erenumab-aooe (Aimovig) 140 MG/ML SOAJ, Inject subcutaneously monthly (Patient not taking: Reported on 7/19/2022), Disp: 1 mL, Rfl: 5    folic acid (RA Folic Acid) 732 mcg tablet, Take 1 tablet (400 mcg total) by mouth daily (Patient not taking: No sig reported), Disp: 30 tablet, Rfl: 0    fremanezumab-vfrm (Ajovy) 225 MG/1 5ML auto-injector, One subQ monthly (Patient not taking: Reported on 7/19/2022), Disp: 1 5 mL, Rfl: 5    ipratropium (ATROVENT) 0 02 % nebulizer solution, Inhale 0 25 mg every 6 (six) hours as needed, Disp: , Rfl:     NON FORMULARY, , Disp: , Rfl:     oxybutynin (DITROPAN) 5 mg tablet, Take 1 tablet (5 mg total) by mouth 3 (three) times a day as needed (bladder spasm) (Patient not taking: Reported on 6/15/2022), Disp: 20 tablet, Rfl: 0    phenazopyridine (PYRIDIUM) 200 mg tablet, Take 1 tablet (200 mg total) by mouth 3 (three) times a day as needed for bladder spasms (Patient not taking: No sig reported), Disp: 30 tablet, Rfl: 0    RA FOLIC ACID 730 MCG tablet, take 1 tablet by mouth once daily (Patient not taking: No sig reported), Disp: 30 tablet, Rfl: 0    tamsulosin (FLOMAX) 0 4 mg, Take 1 capsule (0 4 mg total) by mouth daily with dinner (Patient not taking: No sig reported), Disp: 30 capsule, Rfl: 0      Allergies  Nsaids    Past Medical History  Past Medical History:   Diagnosis Date    Arthritis     Asthma     [facility]     Coronary artery disease     Depression     Diabetes mellitus ([facility] )     with weight loss no longer has high BS    Diverticulitis     Fibromyalgia     Fibromyalgia, primary     GERD (gastroesophageal reflux disease)     Hypertension     lost weight and no longer has    Migraine     Pancreatitis     Stomach problems          Past Surgical History:  Past Surgical History:   Procedure Laterality Date    ABDOMINAL SURGERY      CHOLECYSTECTOMY      GALLBLADDER SURGERY      GASTRIC BYPASS      PANNICULECTOMY  2017    [place] CYSTO/URETERO W/LITHOTRIPSY &INDWELL STENT INSRT Left 9/7/2021    Procedure: CYSTOSCOPY URETEROSCOPY WITH LITHOTRIPSY HOLMIUM LASER, AND INSERTION STENT URETERAL;  Surgeon: Severa Chol, MD;  Location: [facility];  Service: Urology    [place] WRIST Marimar Ling LIG Right 3/3/2020    Procedure: RELEASE CARPAL TUNNEL ENDOSCOPIC Right;  Surgeon: Jona Gunn MD;  [facility];  Service: Orthopedics    [place] WRIST Marimar Ling LIG Left 3/18/2020    Procedure: RELEASE LEFT CARPAL TUNNEL ENDOSCOPIC;  Surgeon: Jona Gunn MD;  [facility];  Service: Orthopedics    STOMACH SURGERY      tummy tuck    TUBAL LIGATION           Family History:  Family History   Problem Relation Age of Onset    Rheum arthritis Mother     Hypertension Mother     COPD Mother     Diabetes Father        Social History:   reports that she has been smoking cigarettes  She has a 37 50 pack-year smoking history  She has never used smokeless tobacco  She reports that she does not drink alcohol and does not use drugs  I have reviewed the past medical history, surgical history, social and family history, current medications, allergies vitals, review of systems, and updated this information as appropriate today  Objective:    Physical Exam    Neurological Exam    GENERAL:  Cooperative in no acute distress  Well-developed and well-nourished    HEAD and NECK   Head is atraumatic normocephalic with no lesions or masses  Neck is supple with full range of motion    CARDIOVASCULAR  Carotid Arteries-no carotid bruits  NEUROLOGIC:  Mental Status-the patient is awake alert and oriented without aphasia or apraxia  Cranial Nerves: Visual fields are full to confrontation  Visual acuity is 20/20 with hand-held chart Extraocular movements are full without nystagmus  Pupils are 2-1/2 mm and reactive  Face is symmetrical to light touch  Movements of facial expression move symmetrically  Hearing is normal to finger rub bilaterally  Soft palate lifts symmetrically  Shoulder shrug is symmetrical  Tongue is midline without atrophy  Motor: No drift is noted on arm extension  Strength is full in the upper and lower extremities with normal bulk and tone  Coordination: Finger to nose testing is performed accurately  Romberg is negative  Gait reveals a normal base with symmetrical arm swing  No temporal artery tenderness, no cervical spine tenderness  ROS:  Review of Systems   Constitutional: Negative  Negative for appetite change and fever  HENT: Negative  Negative for hearing loss, tinnitus, trouble swallowing and voice change  Eyes: Negative  Negative for photophobia and pain  Respiratory: Negative  Negative for shortness of breath  Cardiovascular: Negative  Negative for palpitations  Gastrointestinal: Positive for nausea and vomiting     Endocrine: Negative  Negative for cold intolerance  Genitourinary: Negative  Negative for dysuria, frequency and urgency  Musculoskeletal: Negative  Negative for myalgias and neck pain  Skin: Negative  Negative for rash  Neurological: Positive for weakness and headaches  Negative for dizziness, tremors, seizures, syncope, facial asymmetry, speech difficulty, light-headedness and numbness  Patient states weakness in hands  Hematological: Bruises/bleeds easily  Psychiatric/Behavioral: Positive for sleep disturbance  Negative for confusion and hallucinations

## 2022-08-01 ENCOUNTER — OFFICE VISIT (OUTPATIENT)
Dept: OBGYN CLINIC | Facility: CLINIC | Age: 57
End: 2022-08-01
Payer: COMMERCIAL

## 2022-08-01 ENCOUNTER — APPOINTMENT (OUTPATIENT)
Dept: RADIOLOGY | Facility: CLINIC | Age: 57
End: 2022-08-01
Payer: COMMERCIAL

## 2022-08-01 VITALS
HEART RATE: 76 BPM | DIASTOLIC BLOOD PRESSURE: 79 MMHG | SYSTOLIC BLOOD PRESSURE: 144 MMHG | HEIGHT: 62 IN | WEIGHT: 160 LBS | BODY MASS INDEX: 29.44 KG/M2

## 2022-08-01 DIAGNOSIS — M25.562 LEFT KNEE PAIN, UNSPECIFIED CHRONICITY: ICD-10-CM

## 2022-08-01 DIAGNOSIS — M65.311 TRIGGER FINGER OF RIGHT THUMB: Primary | ICD-10-CM

## 2022-08-01 DIAGNOSIS — M25.561 RIGHT KNEE PAIN, UNSPECIFIED CHRONICITY: ICD-10-CM

## 2022-08-01 DIAGNOSIS — M17.12 PRIMARY OSTEOARTHRITIS OF LEFT KNEE: ICD-10-CM

## 2022-08-01 DIAGNOSIS — M65.321 TRIGGER FINGER, RIGHT INDEX FINGER: ICD-10-CM

## 2022-08-01 DIAGNOSIS — M17.11 PRIMARY OSTEOARTHRITIS OF RIGHT KNEE: ICD-10-CM

## 2022-08-01 PROCEDURE — 20550 NJX 1 TENDON SHEATH/LIGAMENT: CPT | Performed by: ORTHOPAEDIC SURGERY

## 2022-08-01 PROCEDURE — 73564 X-RAY EXAM KNEE 4 OR MORE: CPT

## 2022-08-01 PROCEDURE — 99214 OFFICE O/P EST MOD 30 MIN: CPT | Performed by: ORTHOPAEDIC SURGERY

## 2022-08-01 RX ORDER — LANOLIN ALCOHOL/MO/W.PET/CERES
400 CREAM (GRAM) TOPICAL 2 TIMES DAILY
COMMUNITY
Start: 2022-07-20

## 2022-08-01 RX ORDER — CETIRIZINE HYDROCHLORIDE 10 MG/1
10 TABLET ORAL DAILY
COMMUNITY
Start: 2022-07-12

## 2022-08-01 RX ORDER — CELECOXIB 200 MG/1
200 CAPSULE ORAL 2 TIMES DAILY
COMMUNITY
Start: 2022-07-12

## 2022-08-01 RX ADMIN — LIDOCAINE HYDROCHLORIDE 0.5 ML: 10 INJECTION, SOLUTION INFILTRATION; PERINEURAL at 16:55

## 2022-08-01 RX ADMIN — METHYLPREDNISOLONE ACETATE 0.5 ML: 80 INJECTION, SUSPENSION INTRA-ARTICULAR; INTRALESIONAL; INTRAMUSCULAR; SOFT TISSUE at 16:55

## 2022-08-01 NOTE — PROGRESS NOTES
Patient Name:  Ashley Santana  MRN:  [id number]    [address] [place] [address]     1  Trigger finger of right thumb  -     Hand/upper extremity injection    2  Trigger finger, right index finger  -     Hand/upper extremity injection: R index A1    3  Left knee pain, unspecified chronicity  -     XR knee 4+ vw left injury; Future; Expected date: 08/01/2022  -     Injection Procedure Prior Authorization; Future  -     Ambulatory Referral to Physical Therapy; Future    4  Right knee pain, unspecified chronicity  -     XR knee 4+ vw right injury; Future; Expected date: 08/01/2022  -     Injection Procedure Prior Authorization; Future  -     Ambulatory Referral to Physical Therapy; Future    5  Primary osteoarthritis of left knee  -     Injection Procedure Prior Authorization; Future  -     Ambulatory Referral to Physical Therapy; Future    6  Primary osteoarthritis of right knee  -     Injection Procedure Prior Authorization; Future  -     Ambulatory Referral to Physical Therapy; Future        56 y o  female with Right trigger finger of thumb and index finger  Risks and benefits of corticosteroid injection were discussed in detail  Risks including:  Post injection pain, elevation in blood sugar, skin discoloration, fatty atrophy, and infection were discussed in detail  The patient understood and elected to proceed forward  After sterile preparation the right thumb and right index finger A1 pulleys were injected with 0 5 cc of 1% lidocaine, and 0 5 cc of Depo-Medrol in each injection site  The patient tolerated the procedure and no immediate complications were noted  The patient was instructed to ice and elevate the injection site, limit strenuous activity for the next 24-48 hours, and contact us if there were any questions or concerns prior to their follow-up appointment  They were also instructed to contact their primary care physician to discuss elevated blood sugar        In regards in bilateral knee pain, in depth discussion had with patient regarding continued conservative management of Bilateral  knee osteoarthritis including OTC oral analgesics, topical analgesics, formal outpatient physical therapy for strengthening of quads, hamstrings, hip abductors, ice application, bracing, corticosteroid, viscosupplementation injections  If nonoperative treatment performed with persistent symptoms, also discussed surgical intervention by means of total knee arthroplasty  Discussed surgical intervention, intraoperative findings, post op rehabilitation, and use of walker or cane for ambulation  Risks of surgical intervention discussed with patient including but not limited to infection, fracture, need for additional procedures, injury to surrounding structures, wound healing complications, DVT, PE  Patient verbalized understanding of the above and would like to proceed forward with viscosupplementation injections  I will see the patient back upon approval of viscosupplementation  History of the Present Illness   Sudha Dickey is an ambidextrous, LHD  56 y o  female with  Right trigger finger of thumb and pointer finger  She underwent a right thumb and right long finger A1 pulley corticosteroid injections on 12/16/2021  Symptoms of middle finger of completely resolved  The patient reports she is in pain  She states her right thumb is stuck and her pointer finger is beginning to get stuck  Thumb was doing very well up until the past few weeks  In the past 3 weeks her symptoms started and worsened  She states her pain is worse now than it was before the injection  Patient also states she was referred to Dr Odalys Villegas but was unable to get their due to lack of transportation  Pain is managed with Tylenol, Celebrex, Cymbalta, gabapentin, salonpas, voltaren gel, theraworks relief with no relief in her symptoms  Patient cannot take NSAIDS due to stomach surgery   Patient reported she was setup for a total knee replacement with Dr Terra Clemente, however due to COVID it was canceled and never rescheduled  Patient states per her PCP she is no longer considered a diabetic  Review of Systems     Review of Systems   Constitutional: Negative for appetite change and unexpected weight change  HENT: Negative for congestion and trouble swallowing  Eyes: Negative for visual disturbance  Respiratory: Negative for cough and shortness of breath  Cardiovascular: Negative for chest pain and palpitations  Gastrointestinal: Negative for nausea and vomiting  Endocrine: Negative for cold intolerance and heat intolerance  Musculoskeletal: Negative for joint swelling and myalgias  Skin: Negative for rash  Neurological: Negative for numbness  Physical Exam     /79   Pulse 76   Ht 5' 2" (1 575 m)   Wt 72 6 kg (160 lb)   LMP 07/08/2019 (LMP Unknown)   BMI 29 26 kg/m²     Right Thumb  TTP A1 Pulley  Triggering noted of thumb with attempted flexion of IP joint  Limited active range of motion 2nd triggering  Brisk capillary refill  Sensation intact to light touch radially and ulnarly in the digit      Right Index Finger   TTP A1 Pulley  No active triggering, palpable clicking appreciable over A1 pulley  Near full active range of motion of the IP, PIP, and MCP limited terminal flexion secondary to pain  Brisk capillary refill  Sensation intact to light touch radially and ulnarly in the digit    Right Middle Finger  No tenderness over middle finger A1 pulley   Full active range of motion    Right Knee  Range of motion from 3 to 125  There is mild crepitus with range of motion  There is no effusion  There is mild tenderness over the medial joint line  There is 4+/5 quadriceps  The patient is able to perform a straight leg raise        negative patellar grind test   Anterior drawer tests is negative  negative Lachman Test    Posterior drawer test is   negative   Varus stress testing reveals no instability at 0 and 30 degrees   Valgus stress testing reveals no instability at 0 and 30 degrees  The patient is neurovascular intact distally  Left Knee  Range of motion from 0 to 130  There is mild crepitus with range of motion  There is no effusion  There is mild tenderness over the medial joint line  There is 4-/5 quadriceps strength  The patient is able to perform a straight leg raise  negative patellar grind test   Anterior drawer tests is negative  negative Lachman Test    Posterior drawer test is   negative   Varus stress testing reveals no instability at 0 and 30 degrees   Valgus stress testing reveals no instability at 0 and 30 degrees  The patient is neurovascular intact distally  Data Review     I have personally reviewed pertinent films in PACS, and my interpretation follows  X-rays taken today 8/1/22 of Bilateral knees demonstrates bilateral joint space narrowing medially  There are bone spurs visible  There are no acute osseous abnormalities or fractures  Social History     Tobacco Use    Smoking status: Current Every Day Smoker     Packs/day: 1 50     Years: 25 00     Pack years: 37 50     Types: Cigarettes    Smokeless tobacco: Never Used   Vaping Use    Vaping Use: Never used   Substance Use Topics    Alcohol use: No    Drug use: No       Procedures  Hand/upper extremity injection  Universal Protocol:  Risks and benefits: risks, benefits and alternatives were discussed  Consent given by: patient  Time out: Immediately prior to procedure a "time out" was called to verify the correct patient, procedure, equipment, support staff and site/side marked as required    Timeout called at: 8/1/2022 4:27 PM   Patient understanding: patient states understanding of the procedure being performed  Site marked: the operative site was marked  Patient identity confirmed: verbally with patient    Supporting Documentation  Indications: pain   Procedure Details  Condition:trigger finger Location: thumb -   Preparation: Patient was prepped and draped in the usual sterile fashion  Needle size: 22 G  Ultrasound guidance: no  Approach: volar  Medications administered: 0 5 mL lidocaine 1 %; 0 5 mL methylPREDNISolone acetate 80 mg/mL    Patient tolerance: patient tolerated the procedure well with no immediate complications  Dressing:  Sterile dressing applied     Hand/upper extremity injection: R index A1  Universal Protocol:  Risks and benefits: risks, benefits and alternatives were discussed  Consent given by: patient  Time out: Immediately prior to procedure a "time out" was called to verify the correct patient, procedure, equipment, support staff and site/side marked as required    Patient understanding: patient states understanding of the procedure being performed  Site marked: the operative site was marked  Patient identity confirmed: verbally with patient    Supporting Documentation  Indications: pain   Procedure Details  Condition:trigger finger Location: index finger - R index A1   Preparation: Patient was prepped and draped in the usual sterile fashion  Needle size: 22 G  Ultrasound guidance: no  Approach: volar  Medications administered: 0 5 mL lidocaine 1 %; 0 5 mL methylPREDNISolone acetate 80 mg/mL    Patient tolerance: patient tolerated the procedure well with no immediate complications  Dressing:  Sterile dressing applied            Leonela Huynh DO   Scribe Attestation    I,:  Song Thibodeaux am acting as a scribe while in the presence of the attending physician :       I,:  Leonela Huynh DO personally performed the services described in this documentation    as scribed in my presence :

## 2022-08-02 RX ORDER — LIDOCAINE HYDROCHLORIDE 10 MG/ML
0.5 INJECTION, SOLUTION INFILTRATION; PERINEURAL
Status: COMPLETED | OUTPATIENT
Start: 2022-08-01 | End: 2022-08-01

## 2022-08-02 RX ORDER — METHYLPREDNISOLONE ACETATE 80 MG/ML
0.5 INJECTION, SUSPENSION INTRA-ARTICULAR; INTRALESIONAL; INTRAMUSCULAR; SOFT TISSUE
Status: COMPLETED | OUTPATIENT
Start: 2022-08-01 | End: 2022-08-01

## 2022-08-11 ENCOUNTER — TELEPHONE (OUTPATIENT)
Dept: NEUROLOGY | Facility: CLINIC | Age: 57
End: 2022-08-11

## 2022-08-11 NOTE — TELEPHONE ENCOUNTER
Yamila Morales from [employer] calling to inform that they received approval for Euflexxa prefilled syringe  States that what they need now is a script to be sent to them  Chart reviewed  Seems this is meant for Orthopedic Surgery  Routing encounter to them

## 2022-08-29 ENCOUNTER — PROCEDURE VISIT (OUTPATIENT)
Dept: OBGYN CLINIC | Facility: CLINIC | Age: 57
End: 2022-08-29
Payer: COMMERCIAL

## 2022-08-29 VITALS
HEIGHT: 62 IN | BODY MASS INDEX: 29.81 KG/M2 | DIASTOLIC BLOOD PRESSURE: 82 MMHG | WEIGHT: 162 LBS | HEART RATE: 71 BPM | SYSTOLIC BLOOD PRESSURE: 132 MMHG

## 2022-08-29 DIAGNOSIS — M65.331 TRIGGER FINGER, RIGHT MIDDLE FINGER: ICD-10-CM

## 2022-08-29 DIAGNOSIS — M65.311 TRIGGER FINGER OF RIGHT THUMB: ICD-10-CM

## 2022-08-29 DIAGNOSIS — M65.321 TRIGGER FINGER, RIGHT INDEX FINGER: ICD-10-CM

## 2022-08-29 DIAGNOSIS — M17.12 PRIMARY OSTEOARTHRITIS OF LEFT KNEE: Primary | ICD-10-CM

## 2022-08-29 DIAGNOSIS — M17.11 PRIMARY OSTEOARTHRITIS OF RIGHT KNEE: ICD-10-CM

## 2022-08-29 DIAGNOSIS — M65.341 TRIGGER FINGER, RIGHT RING FINGER: ICD-10-CM

## 2022-08-29 PROCEDURE — 20610 DRAIN/INJ JOINT/BURSA W/O US: CPT | Performed by: PHYSICIAN ASSISTANT

## 2022-08-29 RX ORDER — HYALURONATE SODIUM 10 MG/ML
20 SYRINGE (ML) INTRAARTICULAR
Status: COMPLETED | OUTPATIENT
Start: 2022-08-29 | End: 2022-08-29

## 2022-08-29 RX ADMIN — Medication 20 MG: at 12:20

## 2022-08-29 NOTE — PROGRESS NOTES
After discussing the options for treatment, the patient elected to proceed forward with Bilateral knee Euflexxa injection to reduce pain  Risks of injection, including but not limited to, post-injection pain, swelling, pseudo septic reaction, skin rash, itching, and infection were discussed in detail  The patient understood, had no further questions and elected to proceed forward  After sterile preparation, the Euflexxa injection was injected into the Bilateral knees  The patient tolerated the procedure well no complications were noted  The patient was instructed ice and elevate the extremity, limit strenuous activity for the next 2-3 days, and to contact us if there were any questions or concerns prior to their follow-up appointment  I will see the patient back in 1 week for second injections of bilateral knees        Large joint arthrocentesis: L knee  Universal Protocol:  Consent given by: patient  Patient identity confirmed: verbally with patient    Supporting Documentation  Indications: pain   Procedure Details  Location: knee - L knee  Needle size: 22 G  Approach: lateral  Medications administered: 20 mg Sodium Hyaluronate 20 MG/2ML    Patient tolerance: patient tolerated the procedure well with no immediate complications  Dressing:  Sterile dressing applied    Large joint arthrocentesis: R knee  Universal Protocol:  Consent given by: patient  Patient identity confirmed: verbally with patient    Supporting Documentation  Indications: pain   Procedure Details  Location: knee - R knee  Needle size: 22 G  Medications administered: 20 mg Sodium Hyaluronate 20 MG/2ML    Patient tolerance: patient tolerated the procedure well with no immediate complications  Dressing:  Sterile dressing applied

## 2022-09-12 ENCOUNTER — PROCEDURE VISIT (OUTPATIENT)
Dept: OBGYN CLINIC | Facility: CLINIC | Age: 57
End: 2022-09-12
Payer: COMMERCIAL

## 2022-09-12 VITALS
WEIGHT: 162 LBS | BODY MASS INDEX: 29.81 KG/M2 | HEART RATE: 66 BPM | HEIGHT: 62 IN | DIASTOLIC BLOOD PRESSURE: 81 MMHG | SYSTOLIC BLOOD PRESSURE: 127 MMHG

## 2022-09-12 DIAGNOSIS — M17.12 PRIMARY OSTEOARTHRITIS OF LEFT KNEE: Primary | ICD-10-CM

## 2022-09-12 DIAGNOSIS — M17.11 PRIMARY OSTEOARTHRITIS OF RIGHT KNEE: ICD-10-CM

## 2022-09-12 PROCEDURE — 20610 DRAIN/INJ JOINT/BURSA W/O US: CPT | Performed by: PHYSICIAN ASSISTANT

## 2022-09-12 RX ORDER — HYALURONATE SODIUM 10 MG/ML
20 SYRINGE (ML) INTRAARTICULAR
Status: COMPLETED | OUTPATIENT
Start: 2022-09-12 | End: 2022-09-12

## 2022-09-12 RX ADMIN — Medication 20 MG: at 12:19

## 2022-09-12 NOTE — PROGRESS NOTES
After discussing the options for treatment, the patient elected to proceed forward with Bilateral knee visco injection to reduce pain  Risks of injection, including but not limited to, post-injection pain, swelling, pseudo septic reaction, skin rash, itching, and infection were discussed in detail  The patient understood, had no further questions and elected to proceed forward  After sterile preparation, the Euflexxa injection was injected into the Bilateral knee  The patient tolerated the procedure well no complications were noted  The patient was instructed ice and elevate the extremity, limit strenuous activity for the next 2-3 days, and to contact us if there were any questions or concerns prior to their follow-up appointment  I will see the patient back in 1 week for third injections          Large joint arthrocentesis: L knee  Universal Protocol:  Consent given by: patient  Patient identity confirmed: verbally with patient    Supporting Documentation  Indications: pain   Procedure Details  Location: knee - L knee  Needle size: 22 G  Approach: lateral  Medications administered: 20 mg Sodium Hyaluronate 20 MG/2ML    Patient tolerance: patient tolerated the procedure well with no immediate complications  Dressing:  Sterile dressing applied    Large joint arthrocentesis: R knee  Universal Protocol:  Consent given by: patient  Patient identity confirmed: verbally with patient    Supporting Documentation  Indications: pain   Procedure Details  Location: knee - R knee  Needle size: 22 G  Medications administered: 20 mg Sodium Hyaluronate 20 MG/2ML    Patient tolerance: patient tolerated the procedure well with no immediate complications  Dressing:  Sterile dressing applied

## 2022-09-19 ENCOUNTER — PROCEDURE VISIT (OUTPATIENT)
Dept: OBGYN CLINIC | Facility: CLINIC | Age: 57
End: 2022-09-19
Payer: COMMERCIAL

## 2022-09-19 VITALS
BODY MASS INDEX: 29.81 KG/M2 | HEART RATE: 76 BPM | DIASTOLIC BLOOD PRESSURE: 71 MMHG | SYSTOLIC BLOOD PRESSURE: 109 MMHG | WEIGHT: 162 LBS | HEIGHT: 62 IN

## 2022-09-19 DIAGNOSIS — M17.12 PRIMARY OSTEOARTHRITIS OF LEFT KNEE: Primary | ICD-10-CM

## 2022-09-19 DIAGNOSIS — M25.562 LEFT KNEE PAIN, UNSPECIFIED CHRONICITY: ICD-10-CM

## 2022-09-19 DIAGNOSIS — M25.561 RIGHT KNEE PAIN, UNSPECIFIED CHRONICITY: ICD-10-CM

## 2022-09-19 DIAGNOSIS — M17.11 PRIMARY OSTEOARTHRITIS OF RIGHT KNEE: ICD-10-CM

## 2022-09-19 PROCEDURE — 20610 DRAIN/INJ JOINT/BURSA W/O US: CPT | Performed by: ORTHOPAEDIC SURGERY

## 2022-09-19 RX ORDER — HYALURONATE SODIUM 10 MG/ML
20 SYRINGE (ML) INTRAARTICULAR
Status: COMPLETED | OUTPATIENT
Start: 2022-09-19 | End: 2022-09-19

## 2022-09-19 RX ADMIN — Medication 20 MG: at 12:29

## 2022-09-19 NOTE — PROGRESS NOTES
1  Primary osteoarthritis of left knee  Large joint arthrocentesis: L knee   2  Primary osteoarthritis of right knee  Large joint arthrocentesis: R knee   3  Left knee pain, unspecified chronicity  Large joint arthrocentesis: L knee   4  Right knee pain, unspecified chronicity  Large joint arthrocentesis: R knee       82-year-old female with bilateral knee osteoarthritis  After discussing the options for treatment, the patient elected to proceed forward with a Euflexxa injection to reduce pain  Risks of injection, including but not limited to, post-injection pain, swelling, pseudo septic reaction, skin rash, itching, and infection were discussed in detail  The patient understood, had no further questions and elected to proceed forward  After sterile preparation, third Euflexxa injection was injected into both knees  The patient tolerated the procedure well no complications were noted  The patient was instructed ice and elevate the extremity, limit strenuous activity for the next 2-3 days, and to contact us if there were any questions or concerns prior to their follow-up appointment  I will see the patient back as needed or should problems arise  Large joint arthrocentesis: R knee  Universal Protocol:  Consent: Verbal consent obtained  Risks and benefits: risks, benefits and alternatives were discussed  Consent given by: patient  Time out: Immediately prior to procedure a "time out" was called to verify the correct patient, procedure, equipment, support staff and site/side marked as required    Patient understanding: patient states understanding of the procedure being performed  Site marked: the operative site was marked  Patient identity confirmed: verbally with patient    Supporting Documentation  Indications: pain   Procedure Details  Location: knee - R knee  Preparation: Patient was prepped and draped in the usual sterile fashion  Needle size: 22 G  Ultrasound guidance: no  Approach: anterolateral  Medications administered: 20 mg Sodium Hyaluronate 20 MG/2ML  Specialty Pharmacy Supplied: received medications from pharmacy  Patient tolerance: patient tolerated the procedure well with no immediate complications  Dressing:  Sterile dressing applied    Large joint arthrocentesis: L knee  Universal Protocol:  Consent: Verbal consent obtained  Risks and benefits: risks, benefits and alternatives were discussed  Consent given by: patient  Time out: Immediately prior to procedure a "time out" was called to verify the correct patient, procedure, equipment, support staff and site/side marked as required    Patient understanding: patient states understanding of the procedure being performed  Site marked: the operative site was marked  Patient identity confirmed: verbally with patient    Supporting Documentation  Indications: pain   Procedure Details  Location: knee - L knee  Preparation: Patient was prepped and draped in the usual sterile fashion  Needle size: 22 G  Ultrasound guidance: no  Approach: anterolateral  Medications administered: 20 mg Sodium Hyaluronate 20 MG/2ML  Specialty Pharmacy Supplied: received medications from pharmacy  Patient tolerance: patient tolerated the procedure well with no immediate complications  Dressing:  Sterile dressing applied         Scribe Attestation    I,:  Martine Bagley am acting as a scribe while in the presence of the attending physician :       I,:  Darryn Yee DO personally performed the services described in this documentation    as scribed in my presence :

## 2022-09-27 ENCOUNTER — OFFICE VISIT (OUTPATIENT)
Dept: OBGYN CLINIC | Facility: CLINIC | Age: 57
End: 2022-09-27
Payer: COMMERCIAL

## 2022-09-27 VITALS
DIASTOLIC BLOOD PRESSURE: 81 MMHG | HEIGHT: 62 IN | BODY MASS INDEX: 29.44 KG/M2 | HEART RATE: 73 BPM | WEIGHT: 160 LBS | SYSTOLIC BLOOD PRESSURE: 123 MMHG

## 2022-09-27 DIAGNOSIS — M65.312 TRIGGER THUMB OF LEFT HAND: ICD-10-CM

## 2022-09-27 DIAGNOSIS — M65.332 TRIGGER FINGER, LEFT MIDDLE FINGER: ICD-10-CM

## 2022-09-27 DIAGNOSIS — M65.331 TRIGGER FINGER, RIGHT MIDDLE FINGER: Primary | ICD-10-CM

## 2022-09-27 PROCEDURE — 99243 OFF/OP CNSLTJ NEW/EST LOW 30: CPT | Performed by: STUDENT IN AN ORGANIZED HEALTH CARE EDUCATION/TRAINING PROGRAM

## 2022-09-27 PROCEDURE — 20550 NJX 1 TENDON SHEATH/LIGAMENT: CPT | Performed by: STUDENT IN AN ORGANIZED HEALTH CARE EDUCATION/TRAINING PROGRAM

## 2022-09-27 RX ORDER — BETAMETHASONE SODIUM PHOSPHATE AND BETAMETHASONE ACETATE 3; 3 MG/ML; MG/ML
6 INJECTION, SUSPENSION INTRA-ARTICULAR; INTRALESIONAL; INTRAMUSCULAR; SOFT TISSUE
Status: COMPLETED | OUTPATIENT
Start: 2022-09-27 | End: 2022-09-27

## 2022-09-27 RX ORDER — BUPIVACAINE HYDROCHLORIDE 2.5 MG/ML
1 INJECTION, SOLUTION EPIDURAL; INFILTRATION; INTRACAUDAL
Status: COMPLETED | OUTPATIENT
Start: 2022-09-27 | End: 2022-09-27

## 2022-09-27 RX ADMIN — BUPIVACAINE HYDROCHLORIDE 1 ML: 2.5 INJECTION, SOLUTION EPIDURAL; INFILTRATION; INTRACAUDAL at 14:00

## 2022-09-27 RX ADMIN — BETAMETHASONE SODIUM PHOSPHATE AND BETAMETHASONE ACETATE 6 MG: 3; 3 INJECTION, SUSPENSION INTRA-ARTICULAR; INTRALESIONAL; INTRAMUSCULAR; SOFT TISSUE at 13:48

## 2022-09-27 RX ADMIN — BETAMETHASONE SODIUM PHOSPHATE AND BETAMETHASONE ACETATE 6 MG: 3; 3 INJECTION, SUSPENSION INTRA-ARTICULAR; INTRALESIONAL; INTRAMUSCULAR; SOFT TISSUE at 14:00

## 2022-09-27 RX ADMIN — BUPIVACAINE HYDROCHLORIDE 1 ML: 2.5 INJECTION, SOLUTION EPIDURAL; INFILTRATION; INTRACAUDAL at 13:48

## 2022-09-27 NOTE — PROGRESS NOTES
ORTHOPAEDIC HAND, WRIST, AND ELBOW OFFICE  VISIT       ASSESSMENT/PLAN:      Diagnoses and all orders for this visit:    Trigger finger, right middle finger  -     Ambulatory Referral to Orthopedic Surgery  -     Hand/upper extremity injection: R long A1  -     bupivacaine (PF) (MARCAINE) 0 25 % injection 1 mL  -     betamethasone acetate-betamethasone sodium phosphate (CELESTONE) injection 6 mg    Trigger finger, left middle finger  -     Hand/upper extremity injection: L long A1  -     betamethasone acetate-betamethasone sodium phosphate (CELESTONE) injection 6 mg  -     bupivacaine (PF) (MARCAINE) 0 25 % injection 1 mL    Trigger thumb of left hand  -     Hand/upper extremity injection: L thumb A1  -     betamethasone acetate-betamethasone sodium phosphate (CELESTONE) injection 6 mg  -     bupivacaine (PF) (MARCAINE) 0 25 % injection 3mL      56year old female present with bilateral middle fingers and left thumb trigger fingers    Patient was offered and elected to proceed with corticosteroid injections of bilateral middle fingers and left thumb  She will follow up in six weeks and surgical release will be considered at that time  Risks and benefits of corticosteroid injection were discussed in detail  Risks including:  Post injection pain, elevation in blood sugar, skin discoloration, fatty atrophy, and infection were discussed in detail  The patient understood and elected to proceed forward  After sterile preparation the bilateral long finger and left thumb A1 pulleys were injected with 1 0 cc of 1% bupivocaine, and 1 0 cc of Celestone in each injection site  The patient tolerated the procedure and no immediate complications were noted  The patient was instructed to ice and elevate the injection site, limit strenuous activity for the next 24-48 hours, and contact us if there were any questions or concerns prior to their follow-up appointment      The patient verbalized understanding of exam findings and treatment plan  We engaged in the shared decision-making process and treatment options were discussed at length with the patient  Surgical and conservative management discussed today along with risks and benefits  Follow Up:  6 weeks       To Do Next Visit:  Re-evaluation of current issue    General Discussions:  Trigger Finger: The anatomy and physiology of trigger finger was discussed with the patient today in the office  Edema and increased contact pressure within the flexor tendons at the A1 pulley can cause pain, crepitation, and triggering or locking of the digit resulting in limitation of function  Symptoms can occur at anytime but are typically worse in the morning or after a brief rest from repetitive activity  Treatment options include resting/nighttime MP blocking splints to decrease edema, oral anti-inflammatory medications, home or formal therapy exercises, up to 2 steroid injections within the tendon sheath, or surgical release  While majority of patients do respond to conservative treatment, up to 20% may require surgical release  Operative Discussions:  Trigger Finger Release: The anatomy and physiology of trigger finger was discussed with the patient today in the office  Edema and increased contact pressure within the flexor tendons at the A1 pulley can cause pain, crepitation, and limitation of function  Treatment options include resting MP blocking splints to decrease edema, oral anti-inflammatory medications, home or formal therapy exercises, up to 2 steroid injections or surgical release  While majority of patients do respond to conservative treatment, up to 20% may require surgical release  The patient has elected release of the trigger finger  The patient has elected to undergo a release of the A1 pulley (trigger finger)  A small incision will be made over the palmar aspect of the hand, the tendon sheath holding the flexor tendons will be released    In the postoperative period, light activities are allowed immediately, driving is allowed when narcotic medication has stopped, and the incision may get wet after 2 days  Heavy activities (lifting more than approximately 10 pounds) will be allowed after the follow up appointment in 1-2 weeks  While the pain and discomfort within the wrist typically improves rapidly, some residual discomfort may be present for up to 6 weeks  The nodule that is typically palpable in the palmar aspect of the hand will not be removed, as this would necessitate removal of a portion of the flexor tendon, however the catching, clicking, and locking should resolve  Approximate success rate is 98%  The risks and benefits of the procedure were explained to the patient, which include, but are not limited to: Bleeding, infection, recurrence, pain, scar, damage to tendons, damage to nerves, and damage to blood vessels, need for future surgery and complications related to anesthesia  If bony work is done, risks also include malunion and nonunion  These risks, along with alternative conservative treatment options, and postoperative protocols were voiced back and understood by the patient  All questions were answered to the patient's satisfaction  The patient agrees to comply with a standard postoperative protocol, and is willing to proceed  Education was provided via written and auditory forms  There were no barriers to learning  Written handouts regarding wound care, incision and scar care, and general preoperative information, as well as risks and benefits were provided to the patient      Emy Allison MD  Attending, Orthopaedic Surgery  Hand, Wrist, and Elbow Surgery  [address] [place] [address]    ____________________________________________________________________________________________________________________________________________      CHIEF COMPLAINT:  Chief Complaint   Patient presents with    Right Hand - Pain    Left Hand - Pain SUBJECTIVE:  Roselyn Lake is a 56y o  year old ambidextrous, LHD female who presents today for evaluation and treatment of triggering of thumb, index, middle fingers on the right hand and thumb and middle fingers of the left hand  Experiences clicking, sticking, getting stuck in flexion  Worse in the morning  She reports she had three injections of right thumb, one each into right index and middle, one injection into left middle in the past that provide some relief each time  Had right thumb injected 8/1/2022 which has provided about 80% relief of pain, still is experiencing locking and clicking  History of bilateral carpal tunnel release March 2020  Interested in surgical release because of recurrence of symptoms and pain             I have personally reviewed all the relevant PMH, PSH, SH, FH, Medications and allergies      PAST MEDICAL HISTORY:  Past Medical History:   Diagnosis Date    Arthritis     Asthma     [facility] obstructive [facility] disease) [facility]     Coronary artery disease     Depression     Diabetes mellitus ([facility]     with weight loss no longer has high BS    Diverticulitis     Fibromyalgia     Fibromyalgia, primary     GERD (gastroesophageal reflux disease)     Hypertension     lost weight and no longer has    Migraine     Pancreatitis     Stomach problems        PAST SURGICAL HISTORY:  Past Surgical History:   Procedure Laterality Date    ABDOMINAL SURGERY      CHOLECYSTECTOMY      GALLBLADDER SURGERY      GASTRIC BYPASS      PANNICULECTOMY  2017    [place] CYSTO/URETERO W/LITHOTRIPSY &INDWELL STENT INSRT Left 9/7/2021    Procedure: CYSTOSCOPY URETEROSCOPY WITH LITHOTRIPSY HOLMIUM LASER, AND INSERTION STENT URETERAL;  Surgeon: Kade Estes MD;  Location: [facility];  Service: Urology    [place] WRIST Hayde Small LIG Right 3/3/2020    Procedure: RELEASE CARPAL TUNNEL ENDOSCOPIC Right;  Surgeon: Jacinda Sharma MD;  Location: [facility];  Service: Orthopedics    [place] WRIST Verito Flair LIG Left 3/18/2020    Procedure: RELEASE LEFT CARPAL TUNNEL ENDOSCOPIC;  Surgeon: Katharine Gupta MD;  Location: [facility];  Service: Orthopedics    STOMACH SURGERY      tummy tuck    TUBAL LIGATION         FAMILY HISTORY:  Family History   Problem Relation Age of Onset    Rheum arthritis Mother     Hypertension Mother     COPD Mother     Diabetes Father        SOCIAL HISTORY:  Social History     Tobacco Use    Smoking status: Current Every Day Smoker     Packs/day: 1 50     Years: 25 00     Pack years: 37 50     Types: Cigarettes    Smokeless tobacco: Never Used   Vaping Use    Vaping Use: Never used   Substance Use Topics    Alcohol use: No    Drug use: No       MEDICATIONS:    Current Outpatient Medications:     acetaminophen (TYLENOL) 650 mg CR tablet, Take 1 tablet by mouth every 8 (eight) hours, Disp: , Rfl:     albuterol (2 5 mg/3 mL) 0 083 % nebulizer solution, Take 2 5 mg by nebulization 4 (four) times a day, Disp: , Rfl:     albuterol (PROVENTIL HFA,VENTOLIN HFA) 90 mcg/act inhaler, Inhale 2 puffs every 6 (six) hours as needed for wheezing, Disp: , Rfl:     budesonide-formoterol (SYMBICORT) 160-4 5 mcg/act inhaler, Inhale 2 puffs 2 (two) times a day, Disp: , Rfl:     celecoxib (CeleBREX) 100 mg capsule, take 1 capsule by mouth twice a day, Disp: 60 capsule, Rfl: 3    celecoxib (CeleBREX) 200 mg capsule, Take 200 mg by mouth 2 (two) times a day, Disp: , Rfl:     dicyclomine (BENTYL) 20 mg tablet, Take 20 mg by mouth daily, Disp: , Rfl:     Erenumab-aooe (Aimovig) 140 MG/ML SOAJ, Inject subcutaneously monthly, Disp: 1 mL, Rfl: 5    ferrous sulfate 324 (65 Fe) mg, Take 1 tablet (324 mg total) by mouth 2 (two) times a day before meals (Patient taking differently: Take 324 mg by mouth daily before breakfast), Disp: 60 tablet, Rfl: 0    gabapentin (NEURONTIN) 600 MG tablet, Take 600 mg by mouth 2 (two) times a day , Disp: , Rfl:    magnesium Oxide (MAG-OX) 400 mg TABS, Take 400 mg by mouth 2 (two) times a day, Disp: , Rfl:     magnesium oxide (MAG-OX) 400 mg, Take 1 tablet (400 mg total) by mouth 2 (two) times a day, Disp: 60 tablet, Rfl: 5    NON FORMULARY, , Disp: , Rfl:     ondansetron (ZOFRAN-ODT) 4 mg disintegrating tablet, Take 4 mg by mouth as needed , Disp: , Rfl:     pantoprazole (PROTONIX) 40 mg tablet, 40 mg, Disp: , Rfl:     SUMAtriptan (IMITREX) 100 mg tablet, One tablet once a day p r n  headache maximum twice a week , Disp: 9 tablet, Rfl: 5    traZODone (DESYREL) 150 mg tablet, Take 1 tablet (150 mg total) by mouth daily at bedtime, Disp: 30 tablet, Rfl: 0    cetirizine (ZyrTEC) 10 mg tablet, Take 10 mg by mouth daily, Disp: , Rfl:     ciprofloxacin (CIPRO) 250 mg tablet, take 1 tablet by mouth twice a day for 3 days, Disp: , Rfl:     DULoxetine (CYMBALTA) 30 mg delayed release capsule, Take 30 mg by mouth 2 (two) times a day , Disp: , Rfl:     fremanezumab-vfrm (Ajovy) 225 MG/1 5ML auto-injector, One subQ monthly, Disp: 1 5 mL, Rfl: 5    ipratropium (ATROVENT) 0 02 % nebulizer solution, Inhale 0 25 mg every 6 (six) hours as needed, Disp: , Rfl:   No current facility-administered medications for this visit  ALLERGIES:  Allergies   Allergen Reactions    Nsaids Other (See Comments)     Pt post gastric bypass           REVIEW OF SYSTEMS:  Musculoskeletal:        As noted in HPI  All other systems reviewed and are negative      VITALS:  Vitals:    09/27/22 1311   BP: 123/81   Pulse: 73       LABS:  HgA1c:   Lab Results   Component Value Date    HGBA1C 5 1 04/04/2018     BMP:   Lab Results   Component Value Date    CALCIUM 9 0 09/07/2021    K 4 2 09/07/2021    CO2 30 09/07/2021     09/07/2021    BUN 16 09/07/2021    CREATININE 0 85 09/07/2021       _____________________________________________________  PHYSICAL EXAMINATION:  General: well developed and well nourished, alert, oriented times 3 and appears comfortable  Psychiatric: Normal  HEENT: Normocephalic, Atraumatic Trachea Midline, No torticollis  Pulmonary: No audible wheezing or respiratory distress   Abdomen/GI: Non tender, non distended   Cardiovascular: No pitting edema, 2+ radial pulse   Skin: No masses, erythema, lacerations, fluctation, ulcerations  Neurovascular: Sensation Intact to the Median, Ulnar, Radial Nerve, Motor Intact to the Median, Ulnar, Radial Nerve and Pulses Intact  Musculoskeletal: Normal, except as noted in detailed exam and in HPI  MUSCULOSKELETAL EXAMINATION:    right hand:  Positive palpable nodule over the A1 pulley of all 5 digits  Positive tenderness to palpation over A1 pulley  Positive catching  Positive clicking  Composite fist achievable but patient is apprehensive because long finger will get stuck  Left hand: Positive palpable nudole over the A1 pulley of 1st and 3rd digits  Positive tenderness to palpation over A1 pulley  Positive catching  Positive clicking  Composite fist achievable      ___________________________________________________  STUDIES REVIEWED:  No studies to review         PROCEDURES PERFORMED:  Hand/upper extremity injection: R long A1  Universal Protocol:  Consent: Verbal consent obtained  Risks and benefits: risks, benefits and alternatives were discussed  Time out: Immediately prior to procedure a "time out" was called to verify the correct patient, procedure, equipment, support staff and site/side marked as required    Patient understanding: patient states understanding of the procedure being performed  Site marked: the operative site was marked  Supporting Documentation  Indications: diagnostic and pain   Procedure Details  Condition:trigger finger Location: long finger - R long A1   Needle size: 25 G  Medications administered: 1 mL bupivacaine (PF) 0 25 %; 6 mg betamethasone acetate-betamethasone sodium phosphate 6 (3-3) mg/mL    Patient tolerance: patient tolerated the procedure well with no immediate complications  Dressing:  Sterile dressing applied     Hand/upper extremity injection: L long A1  Universal Protocol:  Consent: Verbal consent obtained  Risks and benefits: risks, benefits and alternatives were discussed  Consent given by: patient  Time out: Immediately prior to procedure a "time out" was called to verify the correct patient, procedure, equipment, support staff and site/side marked as required  Patient understanding: patient states understanding of the procedure being performed  Site marked: the operative site was marked  Patient identity confirmed: verbally with patient    Supporting Documentation  Indications: tendon swelling and pain   Procedure Details  Condition:trigger finger Location: long finger - L long A1   Preparation: Patient was prepped and draped in the usual sterile fashion  Needle size: 25 G  Approach: volar  Medications administered: 6 mg betamethasone acetate-betamethasone sodium phosphate 6 (3-3) mg/mL; 1 mL bupivacaine (PF) 0 25 %    Patient tolerance: patient tolerated the procedure well with no immediate complications  Dressing:  Sterile dressing applied     Hand/upper extremity injection: L thumb A1  Universal Protocol:  Consent: Verbal consent obtained  Risks and benefits: risks, benefits and alternatives were discussed  Consent given by: patient  Time out: Immediately prior to procedure a "time out" was called to verify the correct patient, procedure, equipment, support staff and site/side marked as required    Patient understanding: patient states understanding of the procedure being performed  Site marked: the operative site was marked  Patient identity confirmed: verbally with patient    Supporting Documentation  Indications: tendon swelling   Procedure Details  Condition:trigger finger Location: thumb - L thumb A1   Preparation: Patient was prepped and draped in the usual sterile fashion  Needle size: 25 G  Approach: volar  Medications administered: 6 mg betamethasone acetate-betamethasone sodium phosphate 6 (3-3) mg/mL; 1 mL bupivacaine (PF) 0 25 %    Patient tolerance: patient tolerated the procedure well with no immediate complications  Dressing:  Sterile dressing applied              _____________________________________________________      Scribe Attestation    I,:  Thomas Fuchs am acting as a scribe while in the presence of the attending physician :       I,:  Ethan Woody MD personally performed the services described in this documentation    as scribed in my presence :

## 2022-11-28 ENCOUNTER — OFFICE VISIT (OUTPATIENT)
Dept: NEUROLOGY | Facility: CLINIC | Age: 57
End: 2022-11-28

## 2022-11-28 VITALS
SYSTOLIC BLOOD PRESSURE: 120 MMHG | HEART RATE: 76 BPM | OXYGEN SATURATION: 98 % | HEIGHT: 62 IN | DIASTOLIC BLOOD PRESSURE: 76 MMHG | BODY MASS INDEX: 29.26 KG/M2

## 2022-11-28 DIAGNOSIS — F17.200 SMOKING: ICD-10-CM

## 2022-11-28 DIAGNOSIS — G43.709 CHRONIC MIGRAINE WITHOUT AURA WITHOUT STATUS MIGRAINOSUS, NOT INTRACTABLE: Primary | ICD-10-CM

## 2022-11-28 NOTE — PROGRESS NOTES
Nedra Topete is a 56 y o  female  Chief Complaint   Patient presents with   • Chronic migraine without aura without status migrainosus, n       Assessment:  1  Chronic migraine without aura without status migrainosus, not intractable    2  Smoking        Plan:  Continue with Aimovig once a month  Continue with magnesium oxide and riboflavin  Quit smoking  Continue with gabapentin 600 mg twice a day for now and see if she can slowly decrease it by 300 mg every few weeks  Avoid migraine triggers    Follow-up in 6 months    Discussion:  Patient seems to be doing well on Aimovig for now and she is also taking gabapentin 600 mg twice a day, patient's headaches are pretty much well controlled for the 1st 3 weeks after she takes the Aimovig and the last week she has 2-3 headaches, advised her to avoid excessive use of Tylenol as patient can not take NSAIDs secondary to history of gastric bypass     I have advised her to limit Tylenol to not more than twice a week, since she is not having headaches during the rest of the week I have advised her to see if she can go down on the gabapentin by 300 mg every few weeks and if she needs to take an extra 300 mg during the week of her headaches she can do that, she is not able to tolerate Imitrex and hence was advised to avoid taking it, she has been seen in the past by my associate and felt that she did not qualify for Botox at the current time,     she was advised to avoid migraine triggers which we discussed in detail including foods to avoid she was also strongly encouraged to quit smoking she has a history of occipital neuralgia in the past with transformed migraines and analgesic rebound headaches, she is not keen to go for any occipital nerve blocks, I have advised her to avoid excessive use of over-the-counter analgesics to keep herself well hydrated to go to the hospital if has any worsening symptoms and call me otherwise to see me back in 6 months and follow up with the other physicians  Subjective:    HPI   Patient is here in follow-up for chronic migraine headaches, she has a past medical history significant for hypertension diabetes which have been under control since she had the gastric bypass surgery, history of fibromyalgia and chronic pain syndrome occipital neuralgia status post occipital nerve blocks and chronic migraine headaches she was on Topamax which was discontinued due to her history of kidney stones and is currently on gabapentin 600 mg twice a day magnesium oxide and riboflavin she did not tolerate Depakote in the past since she has been on Aimovig she feels her headaches are very much under control except the last week of the month she gets maybe 2-3 headaches they are mostly unilateral associated with photophobia phonophobia and she takes Tylenol to help with those headaches she could not tolerate Imitrex secondary to her reflux disease and has not been taking that, appetite is good weight has been stable she continues to smoke a pack and half a day no other complaints      Vitals:    11/28/22 0822   BP: 120/76   BP Location: Right arm   Patient Position: Sitting   Cuff Size: Adult   Pulse: 76   SpO2: 98%   Height: 5' 2" (1 575 m)       Current Medications    Current Outpatient Medications:   •  albuterol (2 5 mg/3 mL) 0 083 % nebulizer solution, Take 2 5 mg by nebulization 4 (four) times a day, Disp: , Rfl:   •  albuterol (PROVENTIL HFA,VENTOLIN HFA) 90 mcg/act inhaler, Inhale 2 puffs every 6 (six) hours as needed for wheezing, Disp: , Rfl:   •  budesonide-formoterol (SYMBICORT) 160-4 5 mcg/act inhaler, Inhale 2 puffs 2 (two) times a day, Disp: , Rfl:   •  celecoxib (CeleBREX) 100 mg capsule, take 1 capsule by mouth twice a day, Disp: 60 capsule, Rfl: 3  •  dicyclomine (BENTYL) 20 mg tablet, Take 20 mg by mouth daily, Disp: , Rfl:   •  Erenumab-aooe (Aimovig) 140 MG/ML SOAJ, Inject subcutaneously monthly, Disp: 1 mL, Rfl: 5  •  ferrous sulfate 324 (65 Fe) mg, Take 1 tablet (324 mg total) by mouth 2 (two) times a day before meals (Patient taking differently: Take 324 mg by mouth daily before breakfast), Disp: 60 tablet, Rfl: 0  •  gabapentin (NEURONTIN) 600 MG tablet, Take 600 mg by mouth 2 (two) times a day , Disp: , Rfl:   •  magnesium oxide (MAG-OX) 400 mg, Take 1 tablet (400 mg total) by mouth 2 (two) times a day, Disp: 60 tablet, Rfl: 5  •  NON FORMULARY, , Disp: , Rfl:   •  ondansetron (ZOFRAN-ODT) 4 mg disintegrating tablet, Take 4 mg by mouth as needed , Disp: , Rfl:   •  pantoprazole (PROTONIX) 40 mg tablet, 40 mg, Disp: , Rfl:   •  acetaminophen (TYLENOL) 650 mg CR tablet, Take 1 tablet by mouth every 8 (eight) hours (Patient not taking: Reported on 11/28/2022), Disp: , Rfl:   •  celecoxib (CeleBREX) 200 mg capsule, Take 200 mg by mouth 2 (two) times a day (Patient not taking: Reported on 11/28/2022), Disp: , Rfl:   •  cetirizine (ZyrTEC) 10 mg tablet, Take 10 mg by mouth daily, Disp: , Rfl:   •  ciprofloxacin (CIPRO) 250 mg tablet, take 1 tablet by mouth twice a day for 3 days, Disp: , Rfl:   •  DULoxetine (CYMBALTA) 30 mg delayed release capsule, Take 30 mg by mouth 2 (two) times a day , Disp: , Rfl:   •  fremanezumab-vfrm (Ajovy) 225 MG/1 5ML auto-injector, One subQ monthly, Disp: 1 5 mL, Rfl: 5  •  ipratropium (ATROVENT) 0 02 % nebulizer solution, Inhale 0 25 mg every 6 (six) hours as needed, Disp: , Rfl:   •  magnesium Oxide (MAG-OX) 400 mg TABS, Take 400 mg by mouth 2 (two) times a day, Disp: , Rfl:   •  SUMAtriptan (IMITREX) 100 mg tablet, One tablet once a day p r n  headache maximum twice a week   (Patient not taking: Reported on 11/28/2022), Disp: 9 tablet, Rfl: 5  •  traZODone (DESYREL) 150 mg tablet, Take 1 tablet (150 mg total) by mouth daily at bedtime (Patient not taking: Reported on 11/28/2022), Disp: 30 tablet, Rfl: 0      Allergies  Nsaids    Past Medical History  Past Medical History:   Diagnosis Date   • Arthritis    • Asthma    • COPD (chronic obstructive pulmonary disease) (HCC)    • Coronary artery disease    • Depression    • Diabetes mellitus (HCC)     with weight loss no longer has high BS   • Diverticulitis    • Fibromyalgia    • Fibromyalgia, primary    • GERD (gastroesophageal reflux disease)    • Hypertension     lost weight and no longer has   • Migraine    • Pancreatitis    • Stomach problems          Past Surgical History:  Past Surgical History:   Procedure Laterality Date   • ABDOMINAL SURGERY     • CHOLECYSTECTOMY     • GALLBLADDER SURGERY     • GASTRIC BYPASS     • PANNICULECTOMY  2017   • ME CYSTO/URETERO W/LITHOTRIPSY &INDWELL STENT INSRT Left 9/7/2021    Procedure: CYSTOSCOPY URETEROSCOPY WITH LITHOTRIPSY HOLMIUM LASER, AND INSERTION STENT URETERAL;  Surgeon: Anthony Dior MD;  Location: [facility];  Service: Urology   • ME WRIST [place] Montiel LIG Right 3/3/2020    Procedure: RELEASE CARPAL TUNNEL ENDOSCOPIC Right;  Surgeon: Jose Park MD;  Location: [facility];  Service: Orthopedics   • ME WRIST [place] Montiel LIG Left 3/18/2020    Procedure: RELEASE LEFT CARPAL TUNNEL ENDOSCOPIC;  Surgeon: Jose Park MD;  Location: [facility];  Service: Orthopedics   • STOMACH SURGERY      tummy tuck   • TUBAL LIGATION           Family History:  Family History   Problem Relation Age of Onset   • Rheum arthritis Mother    • Hypertension Mother    • COPD Mother    • Diabetes Father        Social History:   reports that she has been smoking cigarettes  She has a 37 50 pack-year smoking history  She has never used smokeless tobacco  She reports that she does not drink alcohol and does not use drugs  I have reviewed the past medical history, surgical history, social and family history, current medications, allergies vitals, review of systems, and updated this information as appropriate today  Objective:    Physical Exam    Neurological Exam    GENERAL:  Cooperative in no acute distress   Well-developed and well-nourished    HEAD and NECK   Head is atraumatic normocephalic with no lesions or masses  Neck is supple with full range of motion    CARDIOVASCULAR  Carotid Arteries-no carotid bruits  NEUROLOGIC:  Mental Status-the patient is awake alert and oriented without aphasia or apraxia  Cranial Nerves: Visual fields are full to confrontation  Visual acuity is 20/20 with hand-held chart Extraocular movements are full without nystagmus  Pupils are 2-1/2 mm and reactive  Face is symmetrical to light touch  Movements of facial expression move symmetrically  Hearing is normal to finger rub bilaterally  Soft palate lifts symmetrically  Shoulder shrug is symmetrical  Tongue is midline without atrophy  Motor: No drift is noted on arm extension  Strength is full in the upper and lower extremities with normal bulk and tone  Gait is unremarkable  No spine tenderness  ROS:  Review of Systems   Constitutional: Negative  Negative for appetite change and fever  HENT: Negative  Negative for hearing loss, tinnitus, trouble swallowing and voice change  Eyes: Negative  Negative for photophobia, pain and visual disturbance  Respiratory: Negative  Negative for shortness of breath  Cardiovascular: Negative  Negative for palpitations  Gastrointestinal: Negative  Negative for nausea and vomiting  Endocrine: Negative  Negative for cold intolerance  Genitourinary: Negative  Negative for dysuria, frequency and urgency  Musculoskeletal: Negative  Negative for gait problem, myalgias and neck pain  Skin: Negative  Negative for rash  Allergic/Immunologic: Negative  Neurological: Positive for headaches  Negative for dizziness, tremors, seizures, syncope, facial asymmetry, speech difficulty, weakness, light-headedness and numbness  Hematological: Bruises/bleeds easily  Psychiatric/Behavioral: Negative  Negative for confusion, hallucinations and sleep disturbance

## 2022-12-15 DIAGNOSIS — G43.019 INTRACTABLE MIGRAINE WITHOUT AURA AND WITHOUT STATUS MIGRAINOSUS: ICD-10-CM

## 2022-12-15 NOTE — TELEPHONE ENCOUNTER
Received VM transcription:    Good evening, my name is Mireya Garcia calling from [employer]  Calling on behalf of a mutual patient, Jameson Wahl  Calling to follow up on the refill for Aimovig auto injector, 140 mg/mL  If appropriate, please call it in [phone], or fax it at [phone]  Thank you   --------------------------------------------------------    Dr Harry Casillas - Rx entered  Please review and sign if in agreement

## 2022-12-16 RX ORDER — ERENUMAB-AOOE 140 MG/ML
INJECTION, SOLUTION SUBCUTANEOUS
Qty: 1 ML | Refills: 5 | Status: SHIPPED | OUTPATIENT
Start: 2022-12-16

## 2023-02-07 ENCOUNTER — TELEPHONE (OUTPATIENT)
Dept: NEUROLOGY | Facility: CLINIC | Age: 58
End: 2023-02-07

## 2023-02-07 NOTE — TELEPHONE ENCOUNTER
Received VM transcription:    Good afternoon, this is perform specialty pharmacy calling in regards to a patient, Neela Harrison. Calling in regards to a prescription we have here for the patient's Aimovig 140 mg auto injector. It is requiring a prior authorization renewal by the patient's plan [employer]. We sent a fax to the doctor's office and we're doing a follow up call to ensure you received our fax. And once the office has a status update, if they could kindly call us back at [phone]. We would greatly appreciate it. Have a good rest of your day.  --------------------------------------------------------    BIN: [id number]  N: [id number]  ID: [id number]    Will work on PA.

## 2023-02-13 ENCOUNTER — OFFICE VISIT (OUTPATIENT)
Dept: OBGYN CLINIC | Facility: CLINIC | Age: 58
End: 2023-02-13

## 2023-02-13 VITALS
WEIGHT: 169 LBS | HEIGHT: 62 IN | DIASTOLIC BLOOD PRESSURE: 83 MMHG | HEART RATE: 78 BPM | SYSTOLIC BLOOD PRESSURE: 139 MMHG | BODY MASS INDEX: 31.1 KG/M2

## 2023-02-13 DIAGNOSIS — M25.561 CHRONIC PAIN OF BOTH KNEES: ICD-10-CM

## 2023-02-13 DIAGNOSIS — M17.12 PRIMARY OSTEOARTHRITIS OF LEFT KNEE: ICD-10-CM

## 2023-02-13 DIAGNOSIS — M25.562 CHRONIC PAIN OF BOTH KNEES: ICD-10-CM

## 2023-02-13 DIAGNOSIS — M17.11 PRIMARY OSTEOARTHRITIS OF RIGHT KNEE: Primary | ICD-10-CM

## 2023-02-13 DIAGNOSIS — G89.29 CHRONIC PAIN OF BOTH KNEES: ICD-10-CM

## 2023-02-13 RX ORDER — METHYLPREDNISOLONE ACETATE 40 MG/ML
2 INJECTION, SUSPENSION INTRA-ARTICULAR; INTRALESIONAL; INTRAMUSCULAR; SOFT TISSUE
Status: COMPLETED | OUTPATIENT
Start: 2023-02-13 | End: 2023-02-13

## 2023-02-13 RX ORDER — LIDOCAINE HYDROCHLORIDE 10 MG/ML
2 INJECTION, SOLUTION INFILTRATION; PERINEURAL
Status: COMPLETED | OUTPATIENT
Start: 2023-02-13 | End: 2023-02-13

## 2023-02-13 RX ORDER — BUPIVACAINE HYDROCHLORIDE 2.5 MG/ML
2 INJECTION, SOLUTION INFILTRATION; PERINEURAL
Status: COMPLETED | OUTPATIENT
Start: 2023-02-13 | End: 2023-02-13

## 2023-02-13 RX ORDER — CYCLOSPORINE 0.5 MG/ML
1 EMULSION OPHTHALMIC EVERY 12 HOURS
COMMUNITY
Start: 2023-01-30

## 2023-02-13 RX ADMIN — METHYLPREDNISOLONE ACETATE 2 ML: 40 INJECTION, SUSPENSION INTRA-ARTICULAR; INTRALESIONAL; INTRAMUSCULAR; SOFT TISSUE at 15:14

## 2023-02-13 RX ADMIN — LIDOCAINE HYDROCHLORIDE 2 ML: 10 INJECTION, SOLUTION INFILTRATION; PERINEURAL at 15:14

## 2023-02-13 RX ADMIN — BUPIVACAINE HYDROCHLORIDE 2 ML: 2.5 INJECTION, SOLUTION INFILTRATION; PERINEURAL at 15:14

## 2023-02-13 NOTE — PROGRESS NOTES
Patient Name:  Burak Puentes  MRN:  [id number]    [address] [place] [address]     1  Primary osteoarthritis of right knee  -     Large joint arthrocentesis: R knee    2  Primary osteoarthritis of left knee  -     Large joint arthrocentesis: L knee    3  Chronic pain of both knees  -     Large joint arthrocentesis: L knee  -     Large joint arthrocentesis: R knee        56 y o  female with Bilateral knee osteoarthritis with worsening pain  Patient has gotten lasting relief in the past from corticosteroid injection therapy  I offered to repeat this treatment today in the office  Patient consented and underwent the procedure without issues or complications  Risks and benefits of corticosteroid injection were discussed in detail  The patient tolerated the procedure and no immediate complications were noted  I will see the patient back as needed if symptoms worsen or fail to improve for consideration for repeat injections, CSI vs viscosupplementation  Visco supplementation can be repeated no sooner than 3/19/23  Discussed she should follow up with Dr Cici Rahman regarding her trigger fingers for possible surgical intervention vs  CSI therapy  History of the Present Illness   Burak Puentes is a 56 y o  female with Bilateral knee pain  Right is worse than left  She was last seen on 9/19/22 and received her last injection in the Euflexxa series bilaterally  She reports this provided her with only 4 months of relief  She notes increased knee pain with stairs and prolonged standing  She is also complaining of chronic trigger finger in the right long, ring, and index fingers  She received corticosteroid injection to the right long finger on 9/27/22  These provided her with significantly relief for approximately 4 months  Review of Systems     Review of Systems   Constitutional: Positive for activity change  Negative for chills, fever and unexpected weight change     HENT: Negative for hearing loss, nosebleeds and sore throat  Eyes: Negative for pain, redness and visual disturbance  Respiratory: Negative for cough, shortness of breath and wheezing  Cardiovascular: Negative for chest pain, palpitations and leg swelling  Gastrointestinal: Negative for abdominal pain, nausea and vomiting  Endocrine: Negative for polydipsia and polyuria  Genitourinary: Negative for dysuria and hematuria  Musculoskeletal: Positive for arthralgias and myalgias  See HPI   Skin: Negative for rash and wound  Neurological: Negative for dizziness, numbness and headaches  Psychiatric/Behavioral: Negative for decreased concentration and suicidal ideas  The patient is not nervous/anxious  Physical Exam     /83   Pulse 78   Ht 5' 2" (1 575 m)   Wt 76 7 kg (169 lb)   LMP 07/08/2019 (LMP Unknown)   BMI 30 91 kg/m²     Right Knee  Range of motion from 3 to 120  With crepitus   There is no effusion  There is  tenderness over the lateral> medial joint line  Patellofemoral grind: positive   The patient is able to perform a straight leg raise  Varus stress testing reveals no instability at 0 and 30 degrees   Valgus stress testing reveals no instability at 0 and 30 degrees  The patient is neurovascular intact distally  Left  Knee   3 to 120  With crepitus   There is no effusion  There is  tenderness over the lateral> medial joint line  Patellofemoral grind: positive   The patient is able to perform a straight leg raise  Varus stress testing reveals no instability at 0 and 30 degrees   Valgus stress testing reveals no instability at 0 and 30 degrees  The patient is neurovascular intact distally  Data Review     I have personally reviewed pertinent films in PACS, and my interpretation follows  X-rays taken 8/1/22 of Bilateral knees once again reviewed and demonstrate bilateral joint space narrowing medially  There are bone spurs visible   There are no acute osseous abnormalities or fractures  Social History     Tobacco Use   • Smoking status: Every Day     Packs/day: 1 50     Years: 25 00     Pack years: 37 50     Types: Cigarettes   • Smokeless tobacco: Never   Vaping Use   • Vaping Use: Never used   Substance Use Topics   • Alcohol use: No   • Drug use: No           Large joint arthrocentesis: L knee  Universal Protocol:  Consent: Verbal consent obtained  Risks and benefits: risks, benefits and alternatives were discussed  Consent given by: patient  Patient understanding: patient states understanding of the procedure being performed  Patient consent: the patient's understanding of the procedure matches consent given  Site marked: the operative site was marked  Patient identity confirmed: verbally with patient    Supporting Documentation  Indications: pain   Procedure Details  Location: knee - L knee  Preparation: Patient was prepped and draped in the usual sterile fashion  Needle size: 22 G  Approach: anterolateral  Medications administered: 2 mL lidocaine 1 %; 2 mL bupivacaine 0 25 %; 2 mL methylPREDNISolone acetate 40 mg/mL    Patient tolerance: patient tolerated the procedure well with no immediate complications  Dressing:  Sterile dressing applied    Large joint arthrocentesis: R knee  Universal Protocol:  Consent: Verbal consent obtained    Risks and benefits: risks, benefits and alternatives were discussed  Consent given by: patient  Patient understanding: patient states understanding of the procedure being performed  Patient consent: the patient's understanding of the procedure matches consent given  Site marked: the operative site was marked  Patient identity confirmed: verbally with patient    Supporting Documentation  Indications: pain   Procedure Details  Location: knee - R knee  Preparation: Patient was prepped and draped in the usual sterile fashion  Needle size: 22 G  Approach: anterolateral  Medications administered: 2 mL lidocaine 1 %; 2 mL bupivacaine 0 25 %; 2 mL methylPREDNISolone acetate 40 mg/mL    Patient tolerance: patient tolerated the procedure well with no immediate complications  Dressing:  Sterile dressing applied              Scribe Attestation    I,:  Kami Crowell am acting as a scribe while in the presence of the attending physician :       I,:  Letty Cai, DO personally performed the services described in this documentation    as scribed in my presence :

## 2023-03-21 ENCOUNTER — ANESTHESIA EVENT (OUTPATIENT)
Dept: PERIOP | Facility: AMBULARY SURGERY CENTER | Age: 58
End: 2023-03-21

## 2023-03-21 ENCOUNTER — APPOINTMENT (OUTPATIENT)
Dept: LAB | Facility: AMBULARY SURGERY CENTER | Age: 58
End: 2023-03-21
Attending: STUDENT IN AN ORGANIZED HEALTH CARE EDUCATION/TRAINING PROGRAM

## 2023-03-21 ENCOUNTER — OFFICE VISIT (OUTPATIENT)
Dept: OBGYN CLINIC | Facility: CLINIC | Age: 58
End: 2023-03-21

## 2023-03-21 VITALS
HEART RATE: 75 BPM | WEIGHT: 169 LBS | HEIGHT: 62 IN | DIASTOLIC BLOOD PRESSURE: 73 MMHG | BODY MASS INDEX: 31.1 KG/M2 | SYSTOLIC BLOOD PRESSURE: 127 MMHG

## 2023-03-21 DIAGNOSIS — Z01.812 ENCOUNTER FOR PRE-OPERATIVE LABORATORY TESTING: ICD-10-CM

## 2023-03-21 DIAGNOSIS — M65.331 TRIGGER FINGER, RIGHT MIDDLE FINGER: Primary | ICD-10-CM

## 2023-03-21 DIAGNOSIS — M65.321 TRIGGER FINGER, RIGHT INDEX FINGER: ICD-10-CM

## 2023-03-21 LAB
ANION GAP SERPL CALCULATED.3IONS-SCNC: 1 MMOL/L (ref 4–13)
BUN SERPL-MCNC: 8 MG/DL (ref 5–25)
CALCIUM SERPL-MCNC: 9.6 MG/DL (ref 8.3–10.1)
CHLORIDE SERPL-SCNC: 111 MMOL/L (ref 96–108)
CO2 SERPL-SCNC: 26 MMOL/L (ref 21–32)
CREAT SERPL-MCNC: 0.61 MG/DL (ref 0.6–1.3)
GFR SERPL CREATININE-BSD FRML MDRD: 102 ML/MIN/1.73SQ M
GLUCOSE SERPL-MCNC: 82 MG/DL (ref 65–140)
POTASSIUM SERPL-SCNC: 4.2 MMOL/L (ref 3.5–5.3)
SODIUM SERPL-SCNC: 138 MMOL/L (ref 135–147)

## 2023-03-21 NOTE — PRE-PROCEDURE INSTRUCTIONS
Pre-Surgery Instructions:   Medication Instructions   • acetaminophen (TYLENOL) 650 mg CR tablet Uses PRN- OK to take day of surgery   • albuterol (2 5 mg/3 mL) 0 083 % nebulizer solution Uses PRN- OK to take day of surgery   • albuterol (PROVENTIL HFA,VENTOLIN HFA) 90 mcg/act inhaler Uses PRN- OK to take day of surgery   • budesonide-formoterol (SYMBICORT) 160-4 5 mcg/act inhaler Uses PRN- OK to take day of surgery   • celecoxib (CeleBREX) 100 mg capsule Stop taking 7 days prior to surgery  • dicyclomine (BENTYL) 20 mg tablet Take day of surgery  • DULoxetine (CYMBALTA) 30 mg delayed release capsule Take day of surgery  • Erenumab-aooe (Aimovig) 140 MG/ML SOAJ monthly   • ferrous sulfate 324 (65 Fe) mg Hold day of surgery  • gabapentin (NEURONTIN) 600 MG tablet Uses PRN- OK to take day of surgery   • ipratropium (ATROVENT) 0 02 % nebulizer solution Hold day of surgery  • magnesium Oxide (MAG-OX) 400 mg TABS Uses PRN- OK to take day of surgery   • ondansetron (ZOFRAN-ODT) 4 mg disintegrating tablet Take day of surgery  • pantoprazole (PROTONIX) 40 mg tablet Take day of surgery  • Restasis 0 05 % ophthalmic emulsion Take day of surgery  • traZODone (DESYREL) 150 mg tablet Take night before surgery    INSTR ON [facility] CALL,  REPORT LOC , BRING PHOTO ID/MED LIST/INS  INFO ,SHOWER REV , STOP ASA/NSAID/VIT 7 DAY PREOP, PT VERBALIZES UNDERSTANDING W/ NO FURTHER QUESTIONS

## 2023-03-21 NOTE — PATIENT INSTRUCTIONS
The anatomy and physiology of trigger finger was discussed with the patient today in the office  Edema and increased contact pressure within the flexor tendons at the A1 pulley can cause pain, crepitation, and limitation of function  Treatment options include resting MP blocking splints to decrease edema, oral anti-inflammatory medications, home or formal therapy exercises, up to 2 steroid injections within the tendon sheath, or surgical release  While majority of patients do respond to conservative treatment, up to 20% may require surgical release  Surgical release was discussed in depth in the office today  This is an outpatient procedure where the hand will be wrapped up until the first postop visit or sutures will be taken out between 10 and 14 days

## 2023-03-21 NOTE — H&P (VIEW-ONLY)
ORTHOPAEDIC HAND, WRIST, AND ELBOW OFFICE  VISIT       ASSESSMENT/PLAN:      Diagnoses and all orders for this visit:    Trigger finger, right middle finger  -     Case request operating room: RELEASE TRIGGER FINGER INDEX AND MIDDLE FINGERS; Standing  -     Case request operating room: RELEASE TRIGGER FINGER INDEX AND MIDDLE FINGERS    Trigger finger, right index finger  -     Case request operating room: RELEASE TRIGGER FINGER INDEX AND MIDDLE FINGERS; Standing  -     Case request operating room: [address] [facility]    Encounter for pre-operative laboratory testing  -     Basic metabolic panel; Future    Other orders  -     Nursing Communication Warmimg Interventions Implemented; Standing  -     Nursing Communication CHG bath, have staff wash entire body (neck down) per pre-op bathing protocol  Routine, evening prior to, and day of surgery ; Standing  -     Nursing Communication Swab both nares with Povidone-Iodine solution, EXCLUDE if patient has shellfish/Iodine allergy, and replace with nasal alcohol swabstick  Routine, day of surgery, on call to OR; Standing  -     Void on call to OR; Standing  -     Insert peripheral IV; Standing  -     Diet NPO; Sips with meds; Standing      68-year-old female here for her right long and index trigger fingers  Patient has not had significant relief with  A cortisone injections into the A1 pulleys  She wishes to discuss surgical intervention at this time  The anatomy and physiology of trigger finger was discussed with the patient today in the office  Edema and increased contact pressure within the flexor tendons at the A1 pulley can cause pain, crepitation, and limitation of function  Treatment options include resting MP blocking splints to decrease edema, oral anti-inflammatory medications, home or formal therapy exercises, up to 2 steroid injections within the tendon sheath, or surgical release    While majority of patients do respond to conservative treatment, up to 20% may require surgical release  This is an outpatient procedure where the hand will be wrapped up until the first postop visit or sutures will be taken out between 10 and 14 days  The patient verbalized understanding of exam findings and treatment plan  We engaged in the shared decision-making process and treatment options were discussed at length with the patient  Surgical and conservative management discussed today along with risks and benefits  Follow Up: After surgery       To Do Next Visit:  Re-evaluation of current issue        Operative Discussions:  Trigger Finger Release: The anatomy and physiology of trigger finger was discussed with the patient today in the office  Edema and increased contact pressure within the flexor tendons at the A1 pulley can cause pain, crepitation, and limitation of function  Treatment options include resting MP blocking splints to decrease edema, oral anti-inflammatory medications, home or formal therapy exercises, up to 2 steroid injections or surgical release  While majority of patients do respond to conservative treatment, up to 20% may require surgical release  The patient has elected release of the trigger finger  The patient has elected to undergo a release of the A1 pulley (trigger finger)  A small incision will be made over the palmar aspect of the hand, the tendon sheath holding the flexor tendons will be released  In the postoperative period, light activities are allowed immediately, driving is allowed when narcotic medication has stopped, and the incision may get wet after 2 days  Heavy activities (lifting more than approximately 10 pounds) will be allowed after the follow up appointment in 1-2 weeks  While the pain and discomfort within the wrist typically improves rapidly, some residual discomfort may be present for up to 6 weeks    The nodule that is typically palpable in the palmar aspect of the hand will not be removed, as this would necessitate removal of a portion of the flexor tendon, however the catching, clicking, and locking should resolve  Approximate success rate is 98%  The risks and benefits of the procedure were explained to the patient, which include, but are not limited to: Bleeding, infection, recurrence, pain, scar, damage to tendons, damage to nerves, and damage to blood vessels, need for future surgery and complications related to anesthesia  If bony work is done, risks also include malunion and nonunion  These risks, along with alternative conservative treatment options, and postoperative protocols were voiced back and understood by the patient  All questions were answered to the patient's satisfaction  The patient agrees to comply with a standard postoperative protocol, and is willing to proceed  Education was provided via written and auditory forms  There were no barriers to learning  Written handouts regarding wound care, incision and scar care, and general preoperative information, as well as risks and benefits were provided to the patient  Andria Mcdonald MD  Attending, Orthopaedic Surgery  Hand, Wrist, and Elbow Surgery  [address] [place] [address]    ____________________________________________________________________________________________________________________________________________      CHIEF COMPLAINT:  Chief Complaint   Patient presents with   • Right Middle Finger - Follow-up       SUBJECTIVE:  Jolly Fuchs is a 56 y o  LHD female who presents today for follow up of for her bilateral long trigger fingers and left thumb trigger finger  She reports that today the right long trigger finger is most problematic and painful  She has tried to continue doing her exercises that she learned in occupational therapy couple years ago  She has stiffness in multiple joints from her underlying osteoarthritis  She also has fibromyalgia  Patient is a smoker 1 pack plus per day   at her last visit on 9/27/2022, patient had the fingers injected  She has had injections x3 right thumb, right index x1, right long x2, left long x2  Patient has a history of bilateral carpal tunnel release March 2020         I have personally reviewed all the relevant PMH, PSH, SH, FH, Medications and allergies      PAST MEDICAL HISTORY:  Past Medical History:   Diagnosis Date   • Arthritis     osteo   • Asthma    • [facility]    • Coronary artery disease    • Depression    • Diabetes mellitus ([facility] )     with weight loss no longer has high BS   • Diverticulitis    • Fibromyalgia    • Fibromyalgia, primary    • GERD (gastroesophageal reflux disease)    • Hypertension     lost weight and no longer has   • Kidney stone    • Migraine    • Pancreatitis    • Sleep apnea     gone post gastric bypass   • Stomach problems        PAST SURGICAL HISTORY:  Past Surgical History:   Procedure Laterality Date   • ABDOMINAL SURGERY     • CHOLECYSTECTOMY     • GALLBLADDER SURGERY     • GASTRIC BYPASS     • PANNICULECTOMY  2017   • MA CYSTO/URETERO W/LITHOTRIPSY &INDWELL STENT INSRT Left 9/7/2021    Procedure: CYSTOSCOPY URETEROSCOPY WITH LITHOTRIPSY HOLMIUM LASER, AND INSERTION STENT URETERAL;  Surgeon: Noelle Rivas MD;  Location: [facility];  Service: Urology   • MA [address] WRST SURG W/RLS TRANSVRS CARPL LIGM Right 3/3/2020    Procedure: RELEASE CARPAL TUNNEL ENDOSCOPIC Right;  Surgeon: Cheryl Nicholas MD;  Location: [facility];  Service: Orthopedics   • MA [address] WRST SURG W/RLS TRANSVRS CARPL LIGM Left 3/18/2020    Procedure: RELEASE LEFT CARPAL TUNNEL ENDOSCOPIC;  Surgeon: Cheryl Nicholas MD;  Location: [facility];  Service: Orthopedics   • STOMACH SURGERY      tummy milad   • TUBAL LIGATION         FAMILY HISTORY:  Family History   Problem Relation Age of Onset   • Rheum arthritis Mother    • Hypertension Mother    • COPD Mother    • Diabetes Father        SOCIAL HISTORY:  Social History     Tobacco Use   • Smoking status: Every Day     Packs/day: 1 50     Years: 25 00     Pack years: 37 50     Types: Cigarettes   • Smokeless tobacco: Never   Vaping Use   • Vaping Use: Never used   Substance Use Topics   • Alcohol use: No   • Drug use: No       MEDICATIONS:    Current Outpatient Medications:   •  acetaminophen (TYLENOL) 650 mg CR tablet, Take 1 tablet by mouth every 8 (eight) hours, Disp: , Rfl:   •  albuterol (2 5 mg/3 mL) 0 083 % nebulizer solution, Take 2 5 mg by nebulization 4 (four) times a day, Disp: , Rfl:   •  albuterol (PROVENTIL HFA,VENTOLIN HFA) 90 mcg/act inhaler, Inhale 2 puffs every 6 (six) hours as needed for wheezing, Disp: , Rfl:   •  budesonide-formoterol (SYMBICORT) 160-4 5 mcg/act inhaler, Inhale 2 puffs 2 (two) times a day, Disp: , Rfl:   •  celecoxib (CeleBREX) 100 mg capsule, take 1 capsule by mouth twice a day, Disp: 60 capsule, Rfl: 3  •  cetirizine (ZyrTEC) 10 mg tablet, Take 10 mg by mouth daily, Disp: , Rfl:   •  dicyclomine (BENTYL) 20 mg tablet, Take 20 mg by mouth daily, Disp: , Rfl:   •  DULoxetine (CYMBALTA) 30 mg delayed release capsule, Take 30 mg by mouth 2 (two) times a day , Disp: , Rfl:   •  Erenumab-aooe (Aimovig) 140 MG/ML SOAJ, Inject subcutaneously monthly, Disp: 1 mL, Rfl: 5  •  ferrous sulfate 324 (65 Fe) mg, Take 1 tablet (324 mg total) by mouth 2 (two) times a day before meals (Patient taking differently: Take 324 mg by mouth daily before breakfast), Disp: 60 tablet, Rfl: 0  •  gabapentin (NEURONTIN) 600 MG tablet, Take 600 mg by mouth 2 (two) times a day , Disp: , Rfl:   •  ipratropium (ATROVENT) 0 02 % nebulizer solution, Inhale 0 25 mg every 6 (six) hours as needed, Disp: , Rfl:   •  magnesium Oxide (MAG-OX) 400 mg TABS, Take 400 mg by mouth 2 (two) times a day, Disp: , Rfl:   •  NON FORMULARY, , Disp: , Rfl:   •  ondansetron (ZOFRAN-ODT) 4 mg disintegrating tablet, Take 4 mg by mouth as needed , Disp: , Rfl:   •  pantoprazole (PROTONIX) 40 mg tablet, 40 mg, Disp: , Rfl:   •  Restasis 0 05 % ophthalmic emulsion, Administer 1 drop to both eyes every 12 (twelve) hours, Disp: , Rfl:   •  traZODone (DESYREL) 150 mg tablet, Take 1 tablet (150 mg total) by mouth daily at bedtime, Disp: 30 tablet, Rfl: 0    ALLERGIES:  Allergies   Allergen Reactions   • Nsaids Other (See Comments)     Pt post gastric bypass           REVIEW OF SYSTEMS:  Musculoskeletal:        As noted in HPI  All other systems reviewed and are negative  VITALS:  Vitals:    03/21/23 1046   BP: 127/73   Pulse: 75       LABS:  HgA1c:   Lab Results   Component Value Date    HGBA1C 5 1 04/04/2018     BMP:   Lab Results   Component Value Date    CALCIUM 9 0 09/07/2021    K 4 2 09/07/2021    CO2 30 09/07/2021     09/07/2021    BUN 16 09/07/2021    CREATININE 0 85 09/07/2021       _____________________________________________________  PHYSICAL EXAMINATION:  General: well developed and well nourished, alert, oriented times 3 and appears comfortable  Psychiatric: Normal  HEENT: Normocephalic, Atraumatic Trachea Midline, No torticollis  Pulmonary: No audible wheezing or respiratory distress   Abdomen/GI: Non tender, non distended   Cardiovascular: No pitting edema, 2+ radial pulse   Skin: No masses, erythema, lacerations, fluctation, ulcerations  Neurovascular: Sensation Intact to the Median, Ulnar, Radial Nerve, Motor Intact to the Median, Ulnar, Radial Nerve and Pulses Intact  Musculoskeletal: Normal, except as noted in detailed exam and in HPI  MUSCULOSKELETAL EXAMINATION:    right long finger:  Positive palpable nodule over the A1 pulley  Positive tenderness to palpation over A1 pulley  Positive catching  Positive clicking  right index finger:  Positive palpable nodule over the A1 pulley  Negative tenderness to palpation over A1 pulley  Negative catching  Positive clicking        Right Ring and small finger: no obvious triggering or clicking with digit ROM    ___________________________________________________  STUDIES REVIEWED:  No studies to review         PROCEDURES PERFORMED:  Procedures  No Procedures performed today    _____________________________________________________      Janes Iglesias    I,:  Ivis Carpenter am acting as a scribe while in the presence of the attending physician :       I,:  Dinora Karimi MD personally performed the services described in this documentation    as scribed in my presence :

## 2023-03-21 NOTE — PROGRESS NOTES
ORTHOPAEDIC HAND, WRIST, AND ELBOW OFFICE  VISIT       ASSESSMENT/PLAN:      Diagnoses and all orders for this visit:    Trigger finger, right middle finger  -     Case request operating room: RELEASE TRIGGER FINGER INDEX AND MIDDLE FINGERS; Standing  -     Case request operating room: RELEASE TRIGGER FINGER INDEX AND MIDDLE FINGERS    Trigger finger, right index finger  -     Case request operating room: RELEASE TRIGGER FINGER INDEX AND MIDDLE FINGERS; Standing  -     Case request operating room: [address] [address] [facility]    Encounter for pre-operative laboratory testing  -     Basic metabolic panel; Future    Other orders  -     Nursing Communication Warmimg Interventions Implemented; Standing  -     Nursing Communication CHG bath, have staff wash entire body (neck down) per pre-op bathing protocol  Routine, evening prior to, and day of surgery ; Standing  -     Nursing Communication Swab both nares with Povidone-Iodine solution, EXCLUDE if patient has shellfish/Iodine allergy, and replace with nasal alcohol swabstick  Routine, day of surgery, on call to OR; Standing  -     Void on call to OR; Standing  -     Insert peripheral IV; Standing  -     Diet NPO; Sips with meds; Standing      53-year-old female here for her right long and index trigger fingers  Patient has not had significant relief with  A cortisone injections into the A1 pulleys  She wishes to discuss surgical intervention at this time  The anatomy and physiology of trigger finger was discussed with the patient today in the office  Edema and increased contact pressure within the flexor tendons at the A1 pulley can cause pain, crepitation, and limitation of function  Treatment options include resting MP blocking splints to decrease edema, oral anti-inflammatory medications, home or formal therapy exercises, up to 2 steroid injections within the tendon sheath, or surgical release    While majority of patients do respond to conservative treatment, up to 20% may require surgical release  This is an outpatient procedure where the hand will be wrapped up until the first postop visit or sutures will be taken out between 10 and 14 days  The patient verbalized understanding of exam findings and treatment plan  We engaged in the shared decision-making process and treatment options were discussed at length with the patient  Surgical and conservative management discussed today along with risks and benefits  Follow Up: After surgery       To Do Next Visit:  Re-evaluation of current issue        Operative Discussions:  Trigger Finger Release: The anatomy and physiology of trigger finger was discussed with the patient today in the office  Edema and increased contact pressure within the flexor tendons at the A1 pulley can cause pain, crepitation, and limitation of function  Treatment options include resting MP blocking splints to decrease edema, oral anti-inflammatory medications, home or formal therapy exercises, up to 2 steroid injections or surgical release  While majority of patients do respond to conservative treatment, up to 20% may require surgical release  The patient has elected release of the trigger finger  The patient has elected to undergo a release of the A1 pulley (trigger finger)  A small incision will be made over the palmar aspect of the hand, the tendon sheath holding the flexor tendons will be released  In the postoperative period, light activities are allowed immediately, driving is allowed when narcotic medication has stopped, and the incision may get wet after 2 days  Heavy activities (lifting more than approximately 10 pounds) will be allowed after the follow up appointment in 1-2 weeks  While the pain and discomfort within the wrist typically improves rapidly, some residual discomfort may be present for up to 6 weeks    The nodule that is typically palpable in the palmar aspect of the hand will not be removed, as this would necessitate removal of a portion of the flexor tendon, however the catching, clicking, and locking should resolve  Approximate success rate is 98%  The risks and benefits of the procedure were explained to the patient, which include, but are not limited to: Bleeding, infection, recurrence, pain, scar, damage to tendons, damage to nerves, and damage to blood vessels, need for future surgery and complications related to anesthesia  If bony work is done, risks also include malunion and nonunion  These risks, along with alternative conservative treatment options, and postoperative protocols were voiced back and understood by the patient  All questions were answered to the patient's satisfaction  The patient agrees to comply with a standard postoperative protocol, and is willing to proceed  Education was provided via written and auditory forms  There were no barriers to learning  Written handouts regarding wound care, incision and scar care, and general preoperative information, as well as risks and benefits were provided to the patient  Zuri Hernandez MD  Attending, Orthopaedic Surgery  Hand, Wrist, and Elbow Surgery  [address] [place] [address]    ____________________________________________________________________________________________________________________________________________      CHIEF COMPLAINT:  Chief Complaint   Patient presents with   • Right Middle Finger - Follow-up       SUBJECTIVE:  Mikki Todd is a 56 y o  LHD female who presents today for follow up of for her bilateral long trigger fingers and left thumb trigger finger  She reports that today the right long trigger finger is most problematic and painful  She has tried to continue doing her exercises that she learned in occupational therapy couple years ago  She has stiffness in multiple joints from her underlying osteoarthritis  She also has fibromyalgia  Patient is a smoker 1 pack plus per day   at her last visit on 9/27/2022, patient had the fingers injected  She has had injections x3 right thumb, right index x1, right long x2, left long x2  Patient has a history of bilateral carpal tunnel release March 2020         I have personally reviewed all the relevant PMH, PSH, SH, FH, Medications and allergies      PAST MEDICAL HISTORY:  Past Medical History:   Diagnosis Date   • Arthritis     osteo   • Asthma    • COPD (chronic obstructive pulmonary disease) ([facility] )    • Coronary artery disease    • Depression    • Diabetes mellitus ([facility] )     with weight loss no longer has high BS   • Diverticulitis    • Fibromyalgia    • Fibromyalgia, primary    • GERD (gastroesophageal reflux disease)    • Hypertension     lost weight and no longer has   • Kidney stone    • Migraine    • Pancreatitis    • Sleep apnea     gone post gastric bypass   • Stomach problems        PAST SURGICAL HISTORY:  Past Surgical History:   Procedure Laterality Date   • ABDOMINAL SURGERY     • CHOLECYSTECTOMY     • GALLBLADDER SURGERY     • GASTRIC BYPASS     • PANNICULECTOMY  2017   • [place] CYSTO/URETERO W/LITHOTRIPSY &INDWELL STENT INSRT Left 9/7/2021    Procedure: CYSTOSCOPY URETEROSCOPY WITH LITHOTRIPSY HOLMIUM LASER, AND INSERTION STENT URETERAL;  Surgeon: Stefania Lantigua MD;  Location: [facility];  Service: Urology   • [place] [address] WRST SURG W/RLS TRANSVRS CARPL LIGM Right 3/3/2020    Procedure: RELEASE CARPAL TUNNEL ENDOSCOPIC Right;  Surgeon: Grace Concepcion MD;  Location: [facility];  Service: Orthopedics   • [place] [address] WRST SURG W/RLS TRANSVRS CARPL LIGM Left 3/18/2020    Procedure: RELEASE LEFT CARPAL TUNNEL ENDOSCOPIC;  Surgeon: Grace Concepcion MD;  Location: [facility];  Service: Orthopedics   • STOMACH SURGERY      tummy milad   • TUBAL LIGATION         FAMILY HISTORY:  Family History   Problem Relation Age of Onset   • Rheum arthritis Mother    • Hypertension Mother    • COPD Mother    • Diabetes Father        SOCIAL HISTORY:  Social History     Tobacco Use   • Smoking status: Every Day     Packs/day: 1 50     Years: 25 00     Pack years: 37 50     Types: Cigarettes   • Smokeless tobacco: Never   Vaping Use   • Vaping Use: Never used   Substance Use Topics   • Alcohol use: No   • Drug use: No       MEDICATIONS:    Current Outpatient Medications:   •  acetaminophen (TYLENOL) 650 mg CR tablet, Take 1 tablet by mouth every 8 (eight) hours, Disp: , Rfl:   •  albuterol (2 5 mg/3 mL) 0 083 % nebulizer solution, Take 2 5 mg by nebulization 4 (four) times a day, Disp: , Rfl:   •  albuterol (PROVENTIL HFA,VENTOLIN HFA) 90 mcg/act inhaler, Inhale 2 puffs every 6 (six) hours as needed for wheezing, Disp: , Rfl:   •  budesonide-formoterol (SYMBICORT) 160-4 5 mcg/act inhaler, Inhale 2 puffs 2 (two) times a day, Disp: , Rfl:   •  celecoxib (CeleBREX) 100 mg capsule, take 1 capsule by mouth twice a day, Disp: 60 capsule, Rfl: 3  •  cetirizine (ZyrTEC) 10 mg tablet, Take 10 mg by mouth daily, Disp: , Rfl:   •  dicyclomine (BENTYL) 20 mg tablet, Take 20 mg by mouth daily, Disp: , Rfl:   •  DULoxetine (CYMBALTA) 30 mg delayed release capsule, Take 30 mg by mouth 2 (two) times a day , Disp: , Rfl:   •  Erenumab-aooe (Aimovig) 140 MG/ML SOAJ, Inject subcutaneously monthly, Disp: 1 mL, Rfl: 5  •  ferrous sulfate 324 (65 Fe) mg, Take 1 tablet (324 mg total) by mouth 2 (two) times a day before meals (Patient taking differently: Take 324 mg by mouth daily before breakfast), Disp: 60 tablet, Rfl: 0  •  gabapentin (NEURONTIN) 600 MG tablet, Take 600 mg by mouth 2 (two) times a day , Disp: , Rfl:   •  ipratropium (ATROVENT) 0 02 % nebulizer solution, Inhale 0 25 mg every 6 (six) hours as needed, Disp: , Rfl:   •  magnesium Oxide (MAG-OX) 400 mg TABS, Take 400 mg by mouth 2 (two) times a day, Disp: , Rfl:   •  NON FORMULARY, , Disp: , Rfl:   •  ondansetron (ZOFRAN-ODT) 4 mg disintegrating tablet, Take 4 mg by mouth as needed , Disp: , Rfl:   •  pantoprazole (PROTONIX) 40 mg tablet, 40 mg, Disp: , Rfl:   •  Restasis 0 05 % ophthalmic emulsion, Administer 1 drop to both eyes every 12 (twelve) hours, Disp: , Rfl:   •  traZODone (DESYREL) 150 mg tablet, Take 1 tablet (150 mg total) by mouth daily at bedtime, Disp: 30 tablet, Rfl: 0    ALLERGIES:  Allergies   Allergen Reactions   • Nsaids Other (See Comments)     Pt post gastric bypass           REVIEW OF SYSTEMS:  Musculoskeletal:        As noted in HPI  All other systems reviewed and are negative  VITALS:  Vitals:    03/21/23 1046   BP: 127/73   Pulse: 75       LABS:  HgA1c:   Lab Results   Component Value Date    HGBA1C 5 1 04/04/2018     BMP:   Lab Results   Component Value Date    CALCIUM 9 0 09/07/2021    K 4 2 09/07/2021    CO2 30 09/07/2021     09/07/2021    BUN 16 09/07/2021    CREATININE 0 85 09/07/2021       _____________________________________________________  PHYSICAL EXAMINATION:  General: well developed and well nourished, alert, oriented times 3 and appears comfortable  Psychiatric: Normal  HEENT: Normocephalic, Atraumatic Trachea Midline, No torticollis  Pulmonary: No audible wheezing or respiratory distress   Abdomen/GI: Non tender, non distended   Cardiovascular: No pitting edema, 2+ radial pulse   Skin: No masses, erythema, lacerations, fluctation, ulcerations  Neurovascular: Sensation Intact to the Median, Ulnar, Radial Nerve, Motor Intact to the Median, Ulnar, Radial Nerve and Pulses Intact  Musculoskeletal: Normal, except as noted in detailed exam and in HPI  MUSCULOSKELETAL EXAMINATION:    right long finger:  Positive palpable nodule over the A1 pulley  Positive tenderness to palpation over A1 pulley  Positive catching  Positive clicking  right index finger:  Positive palpable nodule over the A1 pulley  Negative tenderness to palpation over A1 pulley  Negative catching  Positive clicking        Right Ring and small finger: no obvious triggering or clicking with digit ROM    ___________________________________________________  STUDIES REVIEWED:  No studies to review         PROCEDURES PERFORMED:  Procedures  No Procedures performed today    _____________________________________________________      Filiberto Bernal    I,:  Wally Carr am acting as a scribe while in the presence of the attending physician :       I,:  Shay Jacobs MD personally performed the services described in this documentation    as scribed in my presence :

## 2023-03-23 ENCOUNTER — HOSPITAL ENCOUNTER (OUTPATIENT)
Facility: AMBULARY SURGERY CENTER | Age: 58
Setting detail: OUTPATIENT SURGERY
Discharge: HOME | End: 2023-03-23
Attending: STUDENT IN AN ORGANIZED HEALTH CARE EDUCATION/TRAINING PROGRAM | Admitting: STUDENT IN AN ORGANIZED HEALTH CARE EDUCATION/TRAINING PROGRAM

## 2023-03-23 ENCOUNTER — ANESTHESIA (OUTPATIENT)
Dept: PERIOP | Facility: AMBULARY SURGERY CENTER | Age: 58
End: 2023-03-23

## 2023-03-23 VITALS
HEIGHT: 62 IN | RESPIRATION RATE: 17 BRPM | DIASTOLIC BLOOD PRESSURE: 53 MMHG | BODY MASS INDEX: 31.47 KG/M2 | HEART RATE: 71 BPM | SYSTOLIC BLOOD PRESSURE: 106 MMHG | WEIGHT: 171 LBS | OXYGEN SATURATION: 97 % | TEMPERATURE: 97.5 F

## 2023-03-23 DIAGNOSIS — Z98.890 S/P TRIGGER FINGER RELEASE: Primary | ICD-10-CM

## 2023-03-23 PROBLEM — F32.9 REACTIVE DEPRESSION (SITUATIONAL): Status: ACTIVE | Noted: 2019-11-12

## 2023-03-23 PROBLEM — M54.42 CHRONIC BILATERAL LOW BACK PAIN WITH LEFT-SIDED SCIATICA: Status: ACTIVE | Noted: 2019-10-01

## 2023-03-23 PROBLEM — M47.816 FACET SYNDROME, LUMBAR: Status: ACTIVE | Noted: 2017-12-01

## 2023-03-23 PROBLEM — M15.9 GENERALIZED OSTEOARTHRITIS: Status: ACTIVE | Noted: 2017-12-01

## 2023-03-23 PROBLEM — G43.719 INTRACTABLE CHRONIC MIGRAINE WITHOUT AURA AND WITHOUT STATUS MIGRAINOSUS: Status: ACTIVE | Noted: 2017-12-01

## 2023-03-23 PROBLEM — K58.9 IBS (IRRITABLE BOWEL SYNDROME): Status: ACTIVE | Noted: 2017-12-01

## 2023-03-23 PROBLEM — G89.29 CHRONIC BILATERAL LOW BACK PAIN WITH LEFT-SIDED SCIATICA: Status: ACTIVE | Noted: 2019-10-01

## 2023-03-23 PROBLEM — D50.9 MICROCYTIC ANEMIA: Status: ACTIVE | Noted: 2021-08-17

## 2023-03-23 RX ORDER — OXYCODONE HYDROCHLORIDE 5 MG/1
5 TABLET ORAL EVERY 6 HOURS PRN
Status: DISCONTINUED | OUTPATIENT
Start: 2023-03-23 | End: 2023-03-23 | Stop reason: HOSPADM

## 2023-03-23 RX ORDER — FENTANYL CITRATE/PF 50 MCG/ML
25 SYRINGE (ML) INJECTION
Status: DISCONTINUED | OUTPATIENT
Start: 2023-03-23 | End: 2023-03-23 | Stop reason: HOSPADM

## 2023-03-23 RX ORDER — MAGNESIUM HYDROXIDE 1200 MG/15ML
LIQUID ORAL AS NEEDED
Status: DISCONTINUED | OUTPATIENT
Start: 2023-03-23 | End: 2023-03-23 | Stop reason: HOSPADM

## 2023-03-23 RX ORDER — OXYCODONE HYDROCHLORIDE 5 MG/1
5 TABLET ORAL EVERY 6 HOURS PRN
Qty: 5 TABLET | Refills: 0 | Status: SHIPPED | OUTPATIENT
Start: 2023-03-23

## 2023-03-23 RX ORDER — SODIUM CHLORIDE, SODIUM LACTATE, POTASSIUM CHLORIDE, CALCIUM CHLORIDE 600; 310; 30; 20 MG/100ML; MG/100ML; MG/100ML; MG/100ML
INJECTION, SOLUTION INTRAVENOUS CONTINUOUS PRN
Status: DISCONTINUED | OUTPATIENT
Start: 2023-03-23 | End: 2023-03-23

## 2023-03-23 RX ORDER — PROPOFOL 10 MG/ML
INJECTION, EMULSION INTRAVENOUS AS NEEDED
Status: DISCONTINUED | OUTPATIENT
Start: 2023-03-23 | End: 2023-03-23

## 2023-03-23 RX ORDER — ONDANSETRON 2 MG/ML
INJECTION INTRAMUSCULAR; INTRAVENOUS AS NEEDED
Status: DISCONTINUED | OUTPATIENT
Start: 2023-03-23 | End: 2023-03-23

## 2023-03-23 RX ORDER — ONDANSETRON 2 MG/ML
4 INJECTION INTRAMUSCULAR; INTRAVENOUS ONCE AS NEEDED
Status: DISCONTINUED | OUTPATIENT
Start: 2023-03-23 | End: 2023-03-23 | Stop reason: HOSPADM

## 2023-03-23 RX ORDER — FENTANYL CITRATE 50 UG/ML
INJECTION, SOLUTION INTRAMUSCULAR; INTRAVENOUS AS NEEDED
Status: DISCONTINUED | OUTPATIENT
Start: 2023-03-23 | End: 2023-03-23

## 2023-03-23 RX ORDER — CEFAZOLIN SODIUM 2 G/50ML
2000 SOLUTION INTRAVENOUS EVERY 8 HOURS
Status: DISCONTINUED | OUTPATIENT
Start: 2023-03-23 | End: 2023-03-23 | Stop reason: HOSPADM

## 2023-03-23 RX ORDER — PROPOFOL 10 MG/ML
INJECTION, EMULSION INTRAVENOUS CONTINUOUS PRN
Status: DISCONTINUED | OUTPATIENT
Start: 2023-03-23 | End: 2023-03-23

## 2023-03-23 RX ORDER — MIDAZOLAM HYDROCHLORIDE 2 MG/2ML
INJECTION, SOLUTION INTRAMUSCULAR; INTRAVENOUS AS NEEDED
Status: DISCONTINUED | OUTPATIENT
Start: 2023-03-23 | End: 2023-03-23

## 2023-03-23 RX ORDER — LIDOCAINE HYDROCHLORIDE 10 MG/ML
INJECTION, SOLUTION EPIDURAL; INFILTRATION; INTRACAUDAL; PERINEURAL AS NEEDED
Status: DISCONTINUED | OUTPATIENT
Start: 2023-03-23 | End: 2023-03-23

## 2023-03-23 RX ADMIN — FENTANYL CITRATE 25 MCG: 50 INJECTION INTRAMUSCULAR; INTRAVENOUS at 09:24

## 2023-03-23 RX ADMIN — PROPOFOL 20 MG: 10 INJECTION, EMULSION INTRAVENOUS at 09:24

## 2023-03-23 RX ADMIN — CEFAZOLIN SODIUM 2000 MG: 2 SOLUTION INTRAVENOUS at 09:13

## 2023-03-23 RX ADMIN — LIDOCAINE HYDROCHLORIDE 50 MG: 10 INJECTION, SOLUTION EPIDURAL; INFILTRATION; INTRACAUDAL; PERINEURAL at 09:19

## 2023-03-23 RX ADMIN — SODIUM CHLORIDE, SODIUM LACTATE, POTASSIUM CHLORIDE, AND CALCIUM CHLORIDE: .6; .31; .03; .02 INJECTION, SOLUTION INTRAVENOUS at 08:10

## 2023-03-23 RX ADMIN — OXYCODONE HYDROCHLORIDE 5 MG: 5 TABLET ORAL at 10:47

## 2023-03-23 RX ADMIN — MIDAZOLAM 2 MG: 1 INJECTION INTRAMUSCULAR; INTRAVENOUS at 09:13

## 2023-03-23 RX ADMIN — FENTANYL CITRATE 25 MCG: 50 INJECTION INTRAMUSCULAR; INTRAVENOUS at 09:19

## 2023-03-23 RX ADMIN — PROPOFOL 100 MCG/KG/MIN: 10 INJECTION, EMULSION INTRAVENOUS at 09:24

## 2023-03-23 RX ADMIN — ONDANSETRON 4 MG: 2 INJECTION INTRAMUSCULAR; INTRAVENOUS at 09:19

## 2023-03-23 RX ADMIN — PROPOFOL 80 MG: 10 INJECTION, EMULSION INTRAVENOUS at 09:19

## 2023-03-23 NOTE — OP NOTE
OPERATIVE REPORT  PATIENT NAME: Lexie Gitelman    :  1965  MRN: [id number]  Pt Location: [facility] 06    SURGERY DATE: 3/23/2023    Surgeon(s) and Role:     * Suzanna Marshall MD - Primary     * Bebe Urbano, [address]    Physician Assistant needed: Physician assistant was needed to assist dissection, retraction, closure  No qualified resident was available  Preop Diagnosis:  Trigger finger, right index finger [M65 321]  Trigger finger, right middle finger [M65 331]    Post-Op Diagnosis Codes:     * Trigger finger, right index finger [M65 321]     * Trigger finger, right middle finger [M65 331]    Procedure(s):  Right - RELEASE TRIGGER FINGER INDEX AND MIDDLE FINGERS RIGHT    Specimen(s):  * No specimens in log *    Estimated Blood Loss:   Minimal    Drains:  Ureteral Drain/Stent Left ureter 156 Fr  (Active)   Number of days: 562       Anesthesia Type:   Conscious Sedation     Operative Indications:  Patient is a 56-year-old female that presented to clinic with a right index and middle trigger finger  She had prior injections, however, the symptoms were refractory to this  We discussed treatment options including observation, repeat injection, and surgical release  Patient elected for A1 pulley releases of right index and middle fingers  She did complain of stiffness and tightness in the ring, small, and thumb of the same hand as well, however, she had no active triggering or locking on examination  Given findings, we recommended observation of the ring, small, thumb  We discussed that if symptoms are still present and consistent with trigger finger, we would address this at a later time  Discussed risks, benefits, and alternatives to treatment    Informed consent was obtained      Operative Findings:  A1 pulley of right index and middle middle finger that was able to be released      Complications:   None     Procedure and Technique:  Patient was identified in the preoperative holding area  Surgical site was marked with patient participation  Patient was taken to operating theater  Extremity was prepped and draped in typical fashion  Formal timeout was performed confirming site, patient, procedure  All present were in agreement  A 50-50 mixture of lidocaine 1% and 0 25% marcaine was used for local field blocks  Tourniquet was inflated  1 5 cm skin incision was made overlying A1 pulley of  middle finger  Full-thickness skin flaps were elevated down to the flexor tendon sheath  Under direct visualization, the A1 pulley was released  1 5 cm skin incision was made overlying A1 pulley of  index finger  Full-thickness skin flaps were elevated down to the flexor tendon sheath  Under direct visualization, the A1 pulley was released  Patient was asked to make a fist and straighten out digits several times and no further triggering was noted  Tourniquet was deflated  Wound was thoroughly irrigated  Skin was closed with 4-0 chromic in horizontal mattress fashion  Wound was dressed with Xeroform, 4 x 4, maia, and Ace bandage  Patient was taken PACU in stable condition     I was present for the entire procedure    Patient Disposition:  PACU         SIGNATURE: Emy Allison MD  DATE: March 23, 2023  TIME: 9:55 AM

## 2023-03-23 NOTE — ANESTHESIA PREPROCEDURE EVALUATION
Procedure:  RELEASE TRIGGER FINGER INDEX AND MIDDLE FINGERS (Right: Hand)    Relevant Problems   CARDIO   (+) Chronic migraine without aura without status migrainosus, not intractable   (+) Hypertension   (+) Intractable chronic migraine without aura and without status migrainosus      GI/HEPATIC   (+) GERD (gastroesophageal reflux disease)      /RENAL   (+) Kidney stone on left side      HEMATOLOGY   (+) Microcytic anemia      MUSCULOSKELETAL   (+) Chronic bilateral low back pain with left-sided sciatica   (+) Facet syndrome, lumbar   (+) Generalized osteoarthritis   (+) Primary osteoarthritis of both knees   (+) Primary osteoarthritis of right knee      NEURO/PSYCH   (+) Chronic bilateral low back pain with left-sided sciatica   (+) Chronic migraine without aura without status migrainosus, not intractable   (+) Intractable chronic migraine without aura and without status migrainosus   (+) Reactive depression (situational)      PULMONARY   (+) Asthma   (+) COPD (chronic obstructive pulmonary disease) (HCC)   (+) Smoking      Endocrine   (+) Diabetes mellitus ([facility] )      Nervous and Auditory   (+) Bilateral carpal tunnel syndrome     Lab Results   Component Value Date    SODIUM 138 03/21/2023    K 4 2 03/21/2023     (H) 03/21/2023    CO2 26 03/21/2023    AGAP 1 (L) 03/21/2023    BUN 8 03/21/2023    CREATININE 0 61 03/21/2023    GLUC 82 03/21/2023    CALCIUM 9 6 03/21/2023    AST 17 09/07/2021    ALT 24 09/07/2021    ALKPHOS 201 (H) 09/07/2021    TP 7 4 09/07/2021    TBILI 0 40 09/07/2021    EGFR 102 03/21/2023     Lab Results   Component Value Date    WBC 6 32 09/07/2021    HGB 12 8 09/07/2021    HCT 39 2 09/07/2021    MCV 89 09/07/2021     09/07/2021               Anesthesia Plan  ASA Score- 2     Anesthesia Type- IV sedation with anesthesia with ASA Monitors  Additional Monitors:   Airway Plan:           Plan Factors-Exercise tolerance (METS): >4 METS  Chart reviewed  EKG reviewed   Imaging results reviewed  Existing labs reviewed  Patient summary reviewed  Induction- intravenous      Postoperative Plan-     Informed Consent-

## 2023-03-23 NOTE — ANESTHESIA POSTPROCEDURE EVALUATION
Post-Op Assessment Note    CV Status:  Stable  Pain Score: 0    Pain management: adequate     Mental Status:  Alert and awake   Hydration Status:  Euvolemic   PONV Controlled:  Controlled   Airway Patency:  Patent      Post Op Vitals Reviewed: Yes      Staff: CRNA, Anesthesiologist         No notable events documented      /63 (03/23/23 1000)    Temp 98 2 °F (36 8 °C) (03/23/23 0955)    Pulse 61 (03/23/23 1000)   Resp 16 (03/23/23 1000)    SpO2 97 % (03/23/23 1000)

## 2023-03-27 ENCOUNTER — TELEPHONE (OUTPATIENT)
Dept: OBGYN CLINIC | Facility: MEDICAL CENTER | Age: 58
End: 2023-03-27

## 2023-03-27 NOTE — TELEPHONE ENCOUNTER
Caller: Aleksey Levy     Doctor: Dr Hood Mcdonald     Reason for call: The patient has some PO bandage questions  Please call to advise   Thank you    Call back#: Dr Hood Mcdonald

## 2023-03-27 NOTE — TELEPHONE ENCOUNTER
Called and spoke with patient  States ACE bandage has been coming off  Instructed patient okay to rewrap ACE bandage

## 2023-05-26 ENCOUNTER — TELEPHONE (OUTPATIENT)
Dept: NEUROLOGY | Facility: CLINIC | Age: 58
End: 2023-05-26

## 2023-06-12 DIAGNOSIS — G43.019 INTRACTABLE MIGRAINE WITHOUT AURA AND WITHOUT STATUS MIGRAINOSUS: ICD-10-CM

## 2023-06-12 RX ORDER — ERENUMAB-AOOE 140 MG/ML
INJECTION, SOLUTION SUBCUTANEOUS
Qty: 1 ML | Refills: 11 | Status: SHIPPED | OUTPATIENT
Start: 2023-06-12

## 2023-07-10 ENCOUNTER — TELEPHONE (OUTPATIENT)
Dept: OBGYN CLINIC | Facility: HOSPITAL | Age: 58
End: 2023-07-10

## 2023-07-10 NOTE — TELEPHONE ENCOUNTER
Caller: Patient  Doctor/office: Marisabel Morrison  #: [phone]       called to start auth/delivery for VISCO/EUFLEXXA/Durolane injections    Body Part: B/l Knees  Pain Level (scale 1-10): 8  Date of last Gel Injection: 9/19/22    Educated patient on Visco procedure.  Medications must first be authorized and delivered to our office BEFORE we can schedule

## 2023-07-27 NOTE — TELEPHONE ENCOUNTER
Caller: Edinson Cifuentes     Doctor: Amilcar Garcia / Mckenzie Rivas     Reason for call: see previous notes    Patient called for status of her injections .       Call back#: [phone]

## 2023-07-28 DIAGNOSIS — M17.0 BILATERAL PRIMARY OSTEOARTHRITIS OF KNEE: Primary | ICD-10-CM

## 2023-08-28 ENCOUNTER — PROCEDURE VISIT (OUTPATIENT)
Dept: OBGYN CLINIC | Facility: CLINIC | Age: 58
End: 2023-08-28
Payer: COMMERCIAL

## 2023-08-28 VITALS
DIASTOLIC BLOOD PRESSURE: 68 MMHG | HEART RATE: 82 BPM | SYSTOLIC BLOOD PRESSURE: 105 MMHG | WEIGHT: 173 LBS | HEIGHT: 62 IN | BODY MASS INDEX: 31.83 KG/M2

## 2023-08-28 DIAGNOSIS — M17.12 PRIMARY OSTEOARTHRITIS OF LEFT KNEE: ICD-10-CM

## 2023-08-28 DIAGNOSIS — M17.11 PRIMARY OSTEOARTHRITIS OF RIGHT KNEE: Primary | ICD-10-CM

## 2023-08-28 PROCEDURE — 20610 DRAIN/INJ JOINT/BURSA W/O US: CPT | Performed by: PHYSICIAN ASSISTANT

## 2023-08-28 NOTE — PROGRESS NOTES
Patient has reported improvements from previous visco injections. Pain is rated at 6/10. After discussing the options for treatment, the patient elected to proceed forward with Bilateral knee Durolane injections to reduce pain. Risks of injection, including but not limited to, post-injection pain, swelling, pseudo septic reaction, skin rash, itching, and infection were discussed in detail. The patient understood, had no further questions and elected to proceed forward. After sterile preparation, the Durolane injections were injected into the Bilateral knees, respectively. The patient tolerated the procedure well no complications were noted. The patient was instructed ice and elevate the extremity, limit strenuous activity for the next 2-3 days, and to contact us if there were any questions or concerns prior to their follow-up appointment. I will see the patient back in 3 months for reevaluation of bilateral knees.         Large joint arthrocentesis: R knee  Universal Protocol:  Risks and benefits: risks, benefits and alternatives were discussed  Consent given by: patient  Patient identity confirmed: verbally with patient    Supporting Documentation  Indications: pain   Procedure Details  Location: knee - R knee  Needle size: 22 G  Approach: lateral  Medications administered: 3 mL sodium hyaluronate 60 MG/3ML    Patient tolerance: patient tolerated the procedure well with no immediate complications  Dressing:  Sterile dressing applied    Large joint arthrocentesis: L knee  Universal Protocol:  Risks and benefits: risks, benefits and alternatives were discussed  Consent given by: patient  Patient identity confirmed: verbally with patient    Supporting Documentation  Indications: pain   Procedure Details  Location: knee - L knee  Needle size: 22 G  Approach: lateral  Medications administered: 3 mL sodium hyaluronate 60 MG/3ML    Patient tolerance: patient tolerated the procedure well with no immediate complications  Dressing:  Sterile dressing applied

## 2023-08-31 ENCOUNTER — OFFICE VISIT (OUTPATIENT)
Dept: OBGYN CLINIC | Facility: CLINIC | Age: 58
End: 2023-08-31
Payer: COMMERCIAL

## 2023-08-31 VITALS
HEIGHT: 62 IN | WEIGHT: 179 LBS | BODY MASS INDEX: 32.94 KG/M2 | HEART RATE: 67 BPM | SYSTOLIC BLOOD PRESSURE: 113 MMHG | DIASTOLIC BLOOD PRESSURE: 75 MMHG

## 2023-08-31 DIAGNOSIS — M65.312 TRIGGER THUMB OF LEFT HAND: ICD-10-CM

## 2023-08-31 DIAGNOSIS — M65.332 TRIGGER FINGER, LEFT MIDDLE FINGER: ICD-10-CM

## 2023-08-31 DIAGNOSIS — M79.642 HAND PAIN, LEFT: Primary | ICD-10-CM

## 2023-08-31 PROCEDURE — 99214 OFFICE O/P EST MOD 30 MIN: CPT | Performed by: ORTHOPAEDIC SURGERY

## 2023-08-31 PROCEDURE — 20550 NJX 1 TENDON SHEATH/LIGAMENT: CPT | Performed by: ORTHOPAEDIC SURGERY

## 2023-08-31 RX ORDER — CYCLOBENZAPRINE HCL 10 MG
1 TABLET ORAL
COMMUNITY

## 2023-08-31 RX ORDER — PRAMIPEXOLE DIHYDROCHLORIDE 0.12 MG/1
0.12 TABLET ORAL EVERY EVENING
COMMUNITY
Start: 2023-08-07

## 2023-08-31 RX ORDER — METHYLPREDNISOLONE ACETATE 40 MG/ML
0.5 INJECTION, SUSPENSION INTRA-ARTICULAR; INTRALESIONAL; INTRAMUSCULAR; SOFT TISSUE
Status: COMPLETED | OUTPATIENT
Start: 2023-08-31 | End: 2023-08-31

## 2023-08-31 RX ORDER — LIDOCAINE HYDROCHLORIDE 10 MG/ML
0.5 INJECTION, SOLUTION INFILTRATION; PERINEURAL
Status: COMPLETED | OUTPATIENT
Start: 2023-08-31 | End: 2023-08-31

## 2023-08-31 RX ORDER — CELECOXIB 200 MG/1
200 CAPSULE ORAL 2 TIMES DAILY
COMMUNITY
Start: 2023-08-07

## 2023-08-31 RX ORDER — MAGNESIUM OXIDE 400 MG/1
TABLET ORAL
COMMUNITY
Start: 2023-08-07

## 2023-08-31 RX ADMIN — LIDOCAINE HYDROCHLORIDE 0.5 ML: 10 INJECTION, SOLUTION INFILTRATION; PERINEURAL at 08:45

## 2023-08-31 RX ADMIN — METHYLPREDNISOLONE ACETATE 0.5 ML: 40 INJECTION, SUSPENSION INTRA-ARTICULAR; INTRALESIONAL; INTRAMUSCULAR; SOFT TISSUE at 08:45

## 2023-08-31 NOTE — PROGRESS NOTES
Patient Name:  Jacque Gottlieb  MRN:  [id number]    [id number] [facility]     1. Hand pain, left    2. Trigger thumb of left hand  -     Hand/upper extremity injection: L thumb A1    3. Trigger finger, left middle finger  -     Hand/upper extremity injection: L long A1        Jacque Gottlieb is a 66 y.o. female with left thumb and long trigger finger. · The patient was offered a corticosteroid injection for their left thumb and middle fingers. Risks and benefits were discussed with the patient in detail. · They tolerated the procedure well. · The patient was educated they may have some irritation in the next few days and should rest, ice, elevate and perform gentle range of motion exercises. They were advised the medicine should begin to work in a few days time. · Physical therapy and home exercises were discussed with the patient  · If symptoms return or worsen patient was instructed to follow-up with orthopedic hand surgery for discussion of surgical intervention versus repeat injection therapy. Follow up as needed. Chief Complaint     Left trigger fingers    History of the Present Illness     Jacque Gottlieb is a 66 y.o. female with left thumb and long trigger finger. The patient has been complaining of trigger fingers for about a week. She was previously seen by me 9/27/23 where she was referred to a hand specialist. Symptoms are managed with Tylenol. She is left handed. She has previously tried physical therapy. Review of Systems     Review of Systems   Constitutional: Negative for chills and fever. HENT: Negative for ear pain and sore throat. Eyes: Negative for pain and visual disturbance. Respiratory: Negative for cough and shortness of breath. Cardiovascular: Negative for chest pain and palpitations. Gastrointestinal: Negative for abdominal pain and vomiting. Genitourinary: Negative for dysuria and hematuria. Musculoskeletal: Positive for arthralgias.  Negative for back pain.   Skin: Negative for color change and rash. Neurological: Negative for seizures and syncope. All other systems reviewed and are negative. Physical Exam     /75   Pulse 67   Ht 5' 2" (1.575 m)   Wt 81.2 kg (179 lb)   LMP 03/03/2019   BMI 32.74 kg/m²     LeftThumb  TTP A1 Pulley  No active triggering  Limited active range of motion 2nd triggering  Brisk capillary refill  Sensation intact to light touch radially and ulnarly in the digit.     Left Middle Finger   TTP A1 Pulley  No active triggering, palpable clicking appreciable over A1 pulley  Near full active range of motion of the IP, PIP, and MCP limited terminal flexion secondary to pain  Brisk capillary refill  Sensation intact to light touch radially and ulnarly in the digit    Eyes:  Anicteric sclerae. Neck:  Supple. Lungs:  Normal respiratory effort. Cardiovascular:  Capillary refill is less than 2 seconds. Skin:  Intact without erythema. Neurologic:  Sensation grossly intact to light touch. Psychiatric:  Mood and affect are appropriate. Data Review     I have personally reviewed pertinent films in PACS, and my interpretation follows: No new images today.     Past Medical History:   Diagnosis Date   • Arthritis     osteo   • Asthma    • COPD (chronic obstructive pulmonary disease) ([address])    • Coronary artery disease    • Depression    • Diabetes mellitus (HCC)     with weight loss no longer has high BS   • Diverticulitis    • Fibromyalgia    • Fibromyalgia, primary    • GERD (gastroesophageal reflux disease)    • Hypertension     lost weight and no longer has   • Kidney stone    • Migraine    • Pancreatitis    • Sleep apnea     gone post gastric bypass   • Stomach problems        Past Surgical History:   Procedure Laterality Date   • ABDOMINAL SURGERY     • CHOLECYSTECTOMY     • GALLBLADDER SURGERY     • GASTRIC BYPASS     • PANNICULECTOMY  2017   • [place] CYSTO/URETERO W/LITHOTRIPSY &INDWELL STENT INSRT Left 9/7/2021 Procedure: CYSTOSCOPY URETEROSCOPY WITH LITHOTRIPSY HOLMIUM LASER, AND INSERTION STENT URETERAL;  Surgeon: Natasha Ramos MD;  [facility];  Service: Urology   • [place] [address] [address] WRST SURG W/RLS TRANSVRS CARPL LIGM Right 3/3/2020    Procedure: RELEASE CARPAL TUNNEL ENDOSCOPIC Right;  Surgeon: Zack Koyanagi, MD;  [facility];  Service: Orthopedics   • [place] [address] SURG W/RLS TRANSVRS CARPL LIGM Left 3/18/2020    Procedure: RELEASE LEFT CARPAL TUNNEL ENDOSCOPIC;  Surgeon: Zack Koyanagi, MD;  [facility];  Service: Orthopedics   • [place] TENDON SHEATH INCISION Right 3/23/2023    Procedure: RELEASE TRIGGER FINGER INDEX AND MIDDLE FINGERS RIGHT;  Surgeon: Ambrose Galvez MD;  [facility];  Service: Orthopedics   • STOMACH SURGERY      tummy tuck   • TUBAL LIGATION         Allergies   Allergen Reactions   • Nsaids Other (See Comments)     Pt post gastric bypass       Current Outpatient Medications on File Prior to Visit   Medication Sig Dispense Refill   • acetaminophen (TYLENOL) 650 mg CR tablet Take 1 tablet by mouth every 8 (eight) hours     • albuterol (2.5 mg/3 mL) 0.083 % nebulizer solution Take 2.5 mg by nebulization 4 (four) times a day     • albuterol (PROVENTIL HFA,VENTOLIN HFA) 90 mcg/act inhaler Inhale 2 puffs every 6 (six) hours as needed for wheezing     • celecoxib (CeleBREX) 200 mg capsule Take 200 mg by mouth 2 (two) times a day     • cyclobenzaprine (FLEXERIL) 10 mg tablet Take 1 tablet by mouth daily at bedtime     • dicyclomine (BENTYL) 20 mg tablet Take 20 mg by mouth daily     • DULoxetine (CYMBALTA) 30 mg delayed release capsule Take 30 mg by mouth 2 (two) times a day      • Erenumab-aooe (Aimovig) 140 MG/ML SOAJ Inject 140mg (1 pen) subcutaneously once a month.  1 mL 11   • ferrous sulfate 324 (65 Fe) mg Take 1 tablet (324 mg total) by mouth 2 (two) times a day before meals (Patient taking differently: Take 324 mg by mouth daily before breakfast) 60 tablet 0   • gabapentin (NEURONTIN) 600 MG tablet Take 600 mg by mouth 2 (two) times a day      • magnesium oxide (MAG-OX) 400 mg tablet TAKE 1 TABLET (400 MG TOTAL) BY MOUTH 2 TIMES A DAY     • ondansetron (ZOFRAN-ODT) 4 mg disintegrating tablet Take 4 mg by mouth as needed      • pantoprazole (PROTONIX) 40 mg tablet 40 mg     • pramipexole (MIRAPEX) 0.125 mg tablet Take 0.125 mg by mouth every evening     • Restasis 0.05 % ophthalmic emulsion Administer 1 drop to both eyes every 12 (twelve) hours     • budesonide-formoterol (SYMBICORT) 160-4.5 mcg/act inhaler Inhale 2 puffs 2 (two) times a day (Patient not taking: Reported on 8/31/2023)     • ipratropium (ATROVENT) 0.02 % nebulizer solution Inhale 0.25 mg every 6 (six) hours as needed     • [DISCONTINUED] celecoxib (CeleBREX) 100 mg capsule take 1 capsule by mouth twice a day 60 capsule 3   • [DISCONTINUED] cetirizine (ZyrTEC) 10 mg tablet Take 10 mg by mouth daily     • [DISCONTINUED] magnesium Oxide (MAG-OX) 400 mg TABS Take 400 mg by mouth 2 (two) times a day     • [DISCONTINUED] NON FORMULARY      • [DISCONTINUED] oxyCODONE (ROXICODONE) 5 immediate release tablet Take 1 tablet (5 mg total) by mouth every 6 (six) hours as needed for severe pain for up to 5 doses Max Daily Amount: 20 mg (Patient not taking: Reported on 4/11/2023) 5 tablet 0   • [DISCONTINUED] traZODone (DESYREL) 150 mg tablet Take 1 tablet (150 mg total) by mouth daily at bedtime 30 tablet 0     No current facility-administered medications on file prior to visit.        Social History     Tobacco Use   • Smoking status: Every Day     Packs/day: 1.50     Years: 25.00     Total pack years: 37.50     Types: Cigarettes   • Smokeless tobacco: Never   Vaping Use   • Vaping Use: Never used   Substance Use Topics   • Alcohol use: No   • Drug use: No       Family History   Problem Relation Age of Onset   • Rheum arthritis Mother    • Hypertension Mother    • COPD Mother    • Diabetes Father              Procedures Performed     Hand/upper extremity injection: L thumb A1  Universal Protocol:  Consent: Verbal consent obtained. Risks and benefits: risks, benefits and alternatives were discussed  Consent given by: patient  Timeout called at: 8/31/2023 9:17 AM.  Patient understanding: patient states understanding of the procedure being performed  Patient consent: the patient's understanding of the procedure matches consent given  Procedure consent: procedure consent matches procedure scheduled  Site marked: the operative site was marked  Patient identity confirmed: verbally with patient    Supporting Documentation  Indications: pain   Procedure Details  Condition:trigger finger Location: thumb - L thumb A1   Preparation: Patient was prepped and draped in the usual sterile fashion  Needle size: 27 G  Medications administered: 0.5 mL lidocaine 1 %; 0.5 mL methylPREDNISolone acetate 40 mg/mL    Patient tolerance: patient tolerated the procedure well with no immediate complications  Dressing:  Sterile dressing applied     Hand/upper extremity injection: L long A1  Universal Protocol:  Consent: Verbal consent obtained.   Risks and benefits: risks, benefits and alternatives were discussed  Consent given by: patient  Timeout called at: 8/31/2023 9:18 AM.  Patient understanding: patient states understanding of the procedure being performed  Patient consent: the patient's understanding of the procedure matches consent given  Procedure consent: procedure consent matches procedure scheduled  Site marked: the operative site was marked  Patient identity confirmed: verbally with patient    Supporting Documentation  Indications: pain   Procedure Details  Condition:trigger finger Location: long finger - L long A1   Needle size: 27 G  Medications administered: 0.5 mL lidocaine 1 %; 0.5 mL methylPREDNISolone acetate 40 mg/mL    Patient tolerance: patient tolerated the procedure well with no immediate complications  Dressing:  Sterile dressing applied               Violet Singer   Scribe Attestation    I,:  Violet Singer am acting as a scribe while in the presence of the attending physician.:       I,:  Max Montes DO personally performed the services described in this documentation    as scribed in my presence.:

## 2023-09-08 ENCOUNTER — TELEPHONE (OUTPATIENT)
Age: 58
End: 2023-09-08

## 2023-09-08 NOTE — TELEPHONE ENCOUNTER
Caller: Patient    Doctor: Myrtice Dancer    Reason for call: Right knee began swelling 9/7. Pain along w/swelling. Pain 10/10.  Please call to discuss    Call back#: [phone]

## 2023-09-08 NOTE — TELEPHONE ENCOUNTER
Caller: Patient     Doctor: Octavio Guzmán    Reason for call:     Patient is returning nurses call, soft transfer to nurse    Call back#: n/a

## 2023-09-08 NOTE — TELEPHONE ENCOUNTER
Received call from pt had R & L knee visco injections on 8/28/23. Yesterday, started getting really bad pain in right knee and swelling. Denies redness and warm to touch. Pain level 10/10. Pt is taking Tylenol for pain. Not able to take NSAIDs due to gastric bypass and SE she has experienced. Resting, elevating, icing. Pt can wrap in ace wrap or sleeve for compression and support which may help swelling and pain. Can use brace if it helps. Pt uses can to offload. Pt denies injury or aggravating factors. Please advice. If pain is so severe, that pt feels need to go to ED, may go.

## 2023-11-07 ENCOUNTER — TELEPHONE (OUTPATIENT)
Dept: NEUROLOGY | Facility: CLINIC | Age: 58
End: 2023-11-07

## 2023-11-13 ENCOUNTER — OFFICE VISIT (OUTPATIENT)
Dept: NEUROLOGY | Facility: CLINIC | Age: 58
End: 2023-11-13
Payer: COMMERCIAL

## 2023-11-13 VITALS
DIASTOLIC BLOOD PRESSURE: 70 MMHG | BODY MASS INDEX: 32.74 KG/M2 | HEIGHT: 62 IN | SYSTOLIC BLOOD PRESSURE: 150 MMHG | HEART RATE: 82 BPM

## 2023-11-13 DIAGNOSIS — F17.200 SMOKING: ICD-10-CM

## 2023-11-13 DIAGNOSIS — G43.709 CHRONIC MIGRAINE WITHOUT AURA WITHOUT STATUS MIGRAINOSUS, NOT INTRACTABLE: Primary | ICD-10-CM

## 2023-11-13 PROCEDURE — 99214 OFFICE O/P EST MOD 30 MIN: CPT | Performed by: PSYCHIATRY & NEUROLOGY

## 2023-11-13 NOTE — PROGRESS NOTES
Thong Anderson is a 66 y.o. female. Chief Complaint   Patient presents with    Migraine       Assessment:  1. Chronic migraine without aura without status migrainosus, not intractable    2. Smoking         Plan:  Continue with Aimovig once a month. Continue with magnesium oxide and riboflavin. Patient advised to quit smoking. Follow-up with family physician and with her pain specialist regarding her gabapentin and see if it can be decreased to the least amount. Avoid migraine triggers including foods to avoid which were discussed in detail avoid excessive use of over-the-counter analgesics. To keep blood pressure cholesterol and sugar under control. Continue monitoring her blood work with her family physician to go to the hospital if has any worsening symptoms and call us otherwise to see me back in 6 months or sooner if needed and follow-up with her other physicians. Subjective: And is here in follow-up for chronic migraine headaches, since her last visit according to the patient she is 80 to 90% better with her headaches since she has been on Aimovig, no side effects, she is on gabapentin for her back issues which is being managed by her family physician/pain specialist no vision difficulties no speech difficulties, appetite is good weight has been stable, no other complaints.       Vitals:    11/13/23 1443   BP: 150/70   BP Location: Left arm   Patient Position: Sitting   Cuff Size: Standard   Pulse: 82   Height: 5' 2" (1.575 m)       Current Medications    Current Outpatient Medications:     acetaminophen (TYLENOL) 650 mg CR tablet, Take 1 tablet by mouth every 8 (eight) hours, Disp: , Rfl:     albuterol (2.5 mg/3 mL) 0.083 % nebulizer solution, Take 2.5 mg by nebulization 4 (four) times a day, Disp: , Rfl:     albuterol (PROVENTIL HFA,VENTOLIN HFA) 90 mcg/act inhaler, Inhale 2 puffs every 6 (six) hours as needed for wheezing, Disp: , Rfl:     budesonide-formoterol (SYMBICORT) 160-4.5 mcg/act inhaler, Inhale 2 puffs 2 (two) times a day, Disp: , Rfl:     celecoxib (CeleBREX) 200 mg capsule, Take 200 mg by mouth 2 (two) times a day, Disp: , Rfl:     Cholecalciferol 125 MCG (5000 UT) capsule, Take 5,000 Units by mouth daily, Disp: , Rfl:     cyclobenzaprine (FLEXERIL) 10 mg tablet, Take 1 tablet by mouth daily at bedtime, Disp: , Rfl:     dicyclomine (BENTYL) 20 mg tablet, Take 20 mg by mouth daily, Disp: , Rfl:     DULoxetine (CYMBALTA) 30 mg delayed release capsule, Take 30 mg by mouth 2 (two) times a day , Disp: , Rfl:     Erenumab-aooe (Aimovig) 140 MG/ML SOAJ, Inject 140mg (1 pen) subcutaneously once a month., Disp: 1 mL, Rfl: 11    ferrous sulfate 324 (65 Fe) mg, Take 1 tablet (324 mg total) by mouth 2 (two) times a day before meals (Patient taking differently: Take 324 mg by mouth daily before breakfast), Disp: 60 tablet, Rfl: 0    gabapentin (NEURONTIN) 600 MG tablet, Take 600 mg by mouth 2 (two) times a day , Disp: , Rfl:     ipratropium (ATROVENT) 0.02 % nebulizer solution, Inhale 0.25 mg every 6 (six) hours as needed, Disp: , Rfl:     magnesium oxide (MAG-OX) 400 mg tablet, TAKE 1 TABLET (400 MG TOTAL) BY MOUTH 2 TIMES A DAY, Disp: , Rfl:     ondansetron (ZOFRAN-ODT) 4 mg disintegrating tablet, Take 4 mg by mouth as needed , Disp: , Rfl:     pantoprazole (PROTONIX) 40 mg tablet, 40 mg, Disp: , Rfl:     pramipexole (MIRAPEX) 0.125 mg tablet, Take 0.125 mg by mouth every evening, Disp: , Rfl:     Restasis 0.05 % ophthalmic emulsion, Administer 1 drop to both eyes every 12 (twelve) hours, Disp: , Rfl:     nicotine polacrilex (COMMIT) 4 MG lozenge, Apply 4 mg to the mouth or throat as needed, Disp: , Rfl:       Allergies  Nsaids    Past Medical History  Past Medical History:   Diagnosis Date    Arthritis     osteo    Asthma     COPD (chronic obstructive pulmonary disease) ([facility])     Coronary artery disease     Depression     Diabetes mellitus ([address])     with weight loss no longer has high BS Diverticulitis     Fibromyalgia     Fibromyalgia, primary     GERD (gastroesophageal reflux disease)     Hypertension     lost weight and no longer has    Kidney stone     Migraine     Pancreatitis     Sleep apnea     gone post gastric bypass    Stomach problems          Past Surgical History:  Past Surgical History:   Procedure Laterality Date    ABDOMINAL SURGERY      CHOLECYSTECTOMY      GALLBLADDER SURGERY      GASTRIC BYPASS      PANNICULECTOMY  2017    [place] CYSTO/URETERO W/LITHOTRIPSY &INDWELL STENT INSRT Left 9/7/2021    Procedure: CYSTOSCOPY URETEROSCOPY WITH LITHOTRIPSY HOLMIUM LASER, AND INSERTION STENT URETERAL;  Surgeon: Natasha Ramos MD;  [facility];  Service: Urology    [place] [address] WRST SURG W/RLS TRANSVRS CARPL LIGM Right 3/3/2020    Procedure: RELEASE CARPAL TUNNEL ENDOSCOPIC Right;  Surgeon: Zack Koyanagi, MD;  [facility];  Service: Orthopedics    [place] [address] [facility] WRST SURG W/RLS TRANSVRS CARPL LIGM Left 3/18/2020    Procedure: RELEASE LEFT CARPAL TUNNEL ENDOSCOPIC;  Surgeon: Zack Koyanagi, MD;  [facility];  Service: Orthopedics    [place] TENDON SHEATH INCISION Right 3/23/2023    Procedure: RELEASE TRIGGER FINGER INDEX AND MIDDLE FINGERS RIGHT;  Surgeon: Ambrose Galvez MD;  [facility];  Service: Orthopedics    STOMACH SURGERY      tummy tuck    TUBAL LIGATION           Family History:  Family History   Problem Relation Age of Onset    Rheum arthritis Mother     Hypertension Mother     COPD Mother     Diabetes Father        Social History:   reports that she has been smoking cigarettes. She has a 37.50 pack-year smoking history. She has never used smokeless tobacco. She reports that she does not drink alcohol and does not use drugs. I have reviewed the past medical history, surgical history, social and family history, current medications, allergies vitals, review of systems, and updated this information as appropriate today.    Objective:    Physical Exam    Neurological Exam     GENERAL:  Cooperative in no acute distress. Well-developed and well-nourished     HEAD and NECK   Head is atraumatic normocephalic with no lesions or masses. Neck is supple with full range of motion     CARDIOVASCULAR  Carotid Arteries-no carotid bruits. NEUROLOGIC:  Mental Status-the patient is awake alert and oriented without aphasia or apraxia  Cranial Nerves: Visual fields are full to confrontation. Visual acuity is 20/20 with hand-held chart extraocular movements are full without nystagmus. Pupils are 2-1/2 mm and reactive. Face is symmetrical to light touch. Movements of facial expression move symmetrically. Hearing is normal to finger rub bilaterally. Soft palate lifts symmetrically. Shoulder shrug is symmetrical. Tongue is midline without atrophy. Motor: No drift is noted on arm extension. Strength is full in the upper and lower extremities with normal bulk and tone. Gait is unremarkable. No spine tenderness, no temporal artery tenderness. ROS:  Review of Systems   Constitutional:  Negative for appetite change, fatigue and fever. HENT: Negative. Negative for hearing loss, tinnitus, trouble swallowing and voice change. Eyes: Negative. Negative for photophobia, pain and visual disturbance. Respiratory: Negative. Negative for shortness of breath. Cardiovascular: Negative. Negative for palpitations. Gastrointestinal: Negative. Negative for nausea and vomiting. Endocrine: Negative. Negative for cold intolerance. Genitourinary: Negative. Negative for dysuria, frequency and urgency. Musculoskeletal:  Negative for back pain, gait problem, myalgias and neck pain. Skin: Negative. Negative for rash. Allergic/Immunologic: Negative. Neurological:  Positive for headaches. Negative for dizziness, tremors, seizures, syncope, facial asymmetry, speech difficulty, weakness, light-headedness and numbness. Hematological: Negative. Does not bruise/bleed easily. Psychiatric/Behavioral: Negative. Negative for confusion, hallucinations and sleep disturbance.

## 2024-06-14 ENCOUNTER — TELEPHONE (OUTPATIENT)
Dept: NEUROLOGY | Facility: CLINIC | Age: 59
End: 2024-06-14

## 2024-06-14 NOTE — TELEPHONE ENCOUNTER
Attempted to confirm upcoming appointment. Patients phone number is no longer in service and spouse's phone had a busy dial tone. Unable to LVM.

## 2024-06-18 ENCOUNTER — TELEPHONE (OUTPATIENT)
Dept: NEUROLOGY | Facility: CLINIC | Age: 59
End: 2024-06-18

## 2024-06-18 NOTE — TELEPHONE ENCOUNTER
Called to confirm appoint for 6/19 the message was the phone number was changed or disconnected. Was unable to confirm

## 2024-06-19 ENCOUNTER — TELEPHONE (OUTPATIENT)
Dept: NEUROLOGY | Facility: CLINIC | Age: 59
End: 2024-06-19

## 2024-06-19 NOTE — TELEPHONE ENCOUNTER
Attempted to contact patient to see if coming to 8:30 AM 6/19/24 Dr. Carrion appointment . Phone not working.

## 2024-08-28 NOTE — TELEPHONE ENCOUNTER
Aimovig requires PA  BIN: [id number]  PCN: [id number]  ID: [id number]  Group: [id number]    Key: [id number]    PA submitted via CM  Awaiting determination 
Called [facility]  Spoke w/Emy  Advised her of PA approval for Aimovig  Jyothi De Souza verbalized understanding 
Fax received from [facility]  PA for Jacob Mustafa approved from 7/19/22 - 1/19/23 
I did sign off on the script, please keep the patient informed      Thank you
Pt called and states that edie adame ctold her that PA is still needed for aimovig  We received fax from [facility] that they were unable to locate pt  Per pt, her ID is   [id number]    ID was incorecton PA  Aimovig PA resubmitted with correct ID  Key: [id number]    [facility] if [facility]  I entered scritp to be sent to [facility]    Please sign off
[Time Spent: ___ minutes] : I have spent [unfilled] minutes of time on the encounter which excludes teaching and separately reported services.
[Time Spent: ___ minutes] : I have spent [unfilled] minutes of time on the encounter which excludes teaching and separately reported services.

## (undated) DEVICE — NEEDLE 25G X 1 1/2

## (undated) DEVICE — STERILE SURGICAL LUBRICANT,  TUBE: Brand: SURGILUBE

## (undated) DEVICE — NON-STERILE REUSABLE TOURNIQUET CUFF SINGLE BLADDER, DUAL PORT AND QUICK CONNECT CONNECTOR: Brand: COLOR CUFF

## (undated) DEVICE — GAUZE SPONGES,16 PLY: Brand: CURITY

## (undated) DEVICE — PADDING CAST 2IN COTTON NON STERILE

## (undated) DEVICE — KNIFE RETROGRADE LIGAMENT

## (undated) DEVICE — ACE WRAP 2 IN UNSTERILE

## (undated) DEVICE — COBAN 2 IN UNSTERILE

## (undated) DEVICE — GLOVE SRG BIOGEL 7

## (undated) DEVICE — SHEATH URETERAL ACCESS 10/12FR 35CM PROXIS

## (undated) DEVICE — GLOVE SRG BIOGEL 6.5

## (undated) DEVICE — 3000CC GUARDIAN II: Brand: GUARDIAN

## (undated) DEVICE — GUIDEWIRE STRGHT TIP 0.035 IN  SOLO PLUS

## (undated) DEVICE — CUFF TOURNIQUET 18 X 4 IN QUICK CONNECT DISP 1 BLADDER

## (undated) DEVICE — STERILE BETHLEHEM PLASTIC HAND: Brand: CARDINAL HEALTH

## (undated) DEVICE — DISPOSABLE EQUIPMENT COVER: Brand: SMALL TOWEL DRAPE

## (undated) DEVICE — UROCATCH BAG

## (undated) DEVICE — PACK TUR

## (undated) DEVICE — INTENDED FOR TISSUE SEPARATION, AND OTHER PROCEDURES THAT REQUIRE A SHARP SURGICAL BLADE TO PUNCTURE OR CUT.: Brand: BARD-PARKER ® CARBON RIB-BACK BLADES

## (undated) DEVICE — TUBING SUCTION 5MM X 12 FT

## (undated) DEVICE — GLOVE INDICATOR PI UNDERGLOVE SZ 7 BLUE

## (undated) DEVICE — STRETCH BANDAGE: Brand: CURITY

## (undated) DEVICE — GLOVE SRG BIOGEL 8

## (undated) DEVICE — BRUSH EZ SCRUB PCMX W/NAIL CLEANER

## (undated) DEVICE — SUT ETHILON 3-0 PS-1 18 IN 1663G

## (undated) DEVICE — CURITY NON-ADHERENT STRIPS: Brand: CURITY

## (undated) DEVICE — LIGHT HANDLE COVER SLEEVE DISP BLUE STELLAR

## (undated) DEVICE — OCCLUSIVE GAUZE STRIP,3% BISMUTH TRIBROMOPHENATE IN PETROLATUM BLEND: Brand: XEROFORM

## (undated) DEVICE — LASER FIBER HOLMIUM 272MICRON

## (undated) DEVICE — BANDAGE, ESMARK LF STR 6"X9' (20/CS): Brand: CYPRESS

## (undated) DEVICE — CATH URETERAL 5FR X 70 CM FLEX TIP POLYUR BARD

## (undated) DEVICE — SPONGE SCRUB 4 PCT CHLORHEXIDINE

## (undated) DEVICE — GLOVE SRG BIOGEL ECLIPSE 7

## (undated) DEVICE — SCD SEQUENTIAL COMPRESSION COMFORT SLEEVE MEDIUM KNEE LENGTH: Brand: KENDALL SCD

## (undated) DEVICE — CATH URET .038 10FR 50CM DUAL LUMEN

## (undated) DEVICE — SPECIMEN CONTAINER STERILE PEEL PACK